# Patient Record
Sex: FEMALE | Race: WHITE | Employment: UNEMPLOYED | URBAN - METROPOLITAN AREA
[De-identification: names, ages, dates, MRNs, and addresses within clinical notes are randomized per-mention and may not be internally consistent; named-entity substitution may affect disease eponyms.]

---

## 2017-02-08 ASSESSMENT — ENCOUNTER SYMPTOMS
ARTHRALGIAS: 1
DECREASED LIBIDO: 1

## 2017-02-22 ENCOUNTER — OFFICE VISIT (OUTPATIENT)
Dept: UROLOGY | Facility: CLINIC | Age: 57
End: 2017-02-22
Payer: COMMERCIAL

## 2017-02-22 VITALS
DIASTOLIC BLOOD PRESSURE: 70 MMHG | BODY MASS INDEX: 25.49 KG/M2 | WEIGHT: 153 LBS | HEIGHT: 65 IN | HEART RATE: 80 BPM | SYSTOLIC BLOOD PRESSURE: 110 MMHG

## 2017-02-22 DIAGNOSIS — Z87.42 HISTORY OF UTERINE PROLAPSE: ICD-10-CM

## 2017-02-22 DIAGNOSIS — N81.11 CYSTOCELE, MIDLINE: ICD-10-CM

## 2017-02-22 DIAGNOSIS — N81.11 CYSTOCELE, MIDLINE: Primary | ICD-10-CM

## 2017-02-22 DIAGNOSIS — N39.3 FEMALE STRESS INCONTINENCE: Primary | ICD-10-CM

## 2017-02-22 LAB
ALBUMIN UR-MCNC: NEGATIVE MG/DL
APPEARANCE UR: CLEAR
BILIRUB UR QL STRIP: NEGATIVE
COLOR UR AUTO: YELLOW
GLUCOSE UR STRIP-MCNC: NEGATIVE MG/DL
HGB UR QL STRIP: NEGATIVE
KETONES UR STRIP-MCNC: NEGATIVE MG/DL
LEUKOCYTE ESTERASE UR QL STRIP: NEGATIVE
NITRATE UR QL: NEGATIVE
PH UR STRIP: 7 PH (ref 5–7)
SP GR UR STRIP: 1.01 (ref 1–1.03)
URN SPEC COLLECT METH UR: NORMAL
UROBILINOGEN UR STRIP-ACNC: 0.2 EU/DL (ref 0.2–1)

## 2017-02-22 PROCEDURE — 51798 US URINE CAPACITY MEASURE: CPT | Performed by: UROLOGY

## 2017-02-22 PROCEDURE — 99204 OFFICE O/P NEW MOD 45 MIN: CPT | Mod: 25 | Performed by: UROLOGY

## 2017-02-22 PROCEDURE — 81003 URINALYSIS AUTO W/O SCOPE: CPT | Performed by: UROLOGY

## 2017-02-22 ASSESSMENT — ENCOUNTER SYMPTOMS
INSOMNIA: 0
CHILLS: 0
MEMORY LOSS: 0
DECREASED APPETITE: 0
WEIGHT GAIN: 0
CONSTIPATION: 0
DOUBLE VISION: 0
NUMBNESS: 0
SPEECH CHANGE: 0
LOSS OF CONSCIOUSNESS: 0
DYSPNEA ON EXERTION: 0
COUGH DISTURBING SLEEP: 0
EYE PAIN: 0
PALPITATIONS: 0
WEAKNESS: 0
DIZZINESS: 0
HEMOPTYSIS: 0
NAIL CHANGES: 0
LEG SWELLING: 0
POLYPHAGIA: 0
TREMORS: 0
SINUS PAIN: 0
POLYDIPSIA: 0
CLAUDICATION: 0
SHORTNESS OF BREATH: 0
POSTURAL DYSPNEA: 0
JAUNDICE: 0
PANIC: 0
WEIGHT LOSS: 0
TROUBLE SWALLOWING: 0
SKIN CHANGES: 0
BLOATING: 0
LEG PAIN: 0
HALLUCINATIONS: 0
HEARTBURN: 0
ARTHRALGIAS: 1
BOWEL INCONTINENCE: 0
COUGH: 0
EYE WATERING: 0
SORE THROAT: 0
DECREASED CONCENTRATION: 0
ALTERED TEMPERATURE REGULATION: 0
PARALYSIS: 0
SMELL DISTURBANCE: 0
DECREASED LIBIDO: 1
ABDOMINAL PAIN: 0
EXTREMITY NUMBNESS: 0
SINUS CONGESTION: 0
BLOOD IN STOOL: 0
HYPOTENSION: 0
BREAST PAIN: 0
DIARRHEA: 0
FATIGUE: 0
NIGHT SWEATS: 0
SNORES LOUDLY: 0
LIGHT-HEADEDNESS: 0
ORTHOPNEA: 0
VOMITING: 0
HYPERTENSION: 0
NERVOUS/ANXIOUS: 0
TINGLING: 0
SWOLLEN GLANDS: 0
EXERCISE INTOLERANCE: 0
FEVER: 0
SLEEP DISTURBANCES DUE TO BREATHING: 0
INCREASED ENERGY: 0
BRUISES/BLEEDS EASILY: 0
BREAST MASS: 0
SEIZURES: 0
RECTAL BLEEDING: 0
DEPRESSION: 0
DISTURBANCES IN COORDINATION: 0
EYE IRRITATION: 0
TASTE DISTURBANCE: 0
NAUSEA: 0
HOARSE VOICE: 0
NECK MASS: 0
RECTAL PAIN: 0
RESPIRATORY PAIN: 0
SPUTUM PRODUCTION: 0
HEADACHES: 0
EYE REDNESS: 0
WHEEZING: 0
TACHYCARDIA: 0
SYNCOPE: 0
POOR WOUND HEALING: 0

## 2017-02-22 ASSESSMENT — PAIN SCALES - GENERAL: PAINLEVEL: NO PAIN (0)

## 2017-02-22 NOTE — MR AVS SNAPSHOT
After Visit Summary   2/22/2017    Leticia Tompkins    MRN: [id number]           Patient Information     Date Of Birth          1960        Visit Information        Provider Department      2/22/2017 1:00 PM Gris Cooney MD [facility] [place]        Today's Diagnoses     Female stress incontinence    -  1    Cystocele, midline          Care Instructions    Websites with free information:    American Urogynecologic Society patient website: www.voicesforpfd.org    Total Control Program: www.totalcontrolprogram.com    Please see one of the dedicated pelvic floor physical therapist ([facility] [phone])    Please return for bladder testing (urodynamics)    Please return to see me after the bladder testing for us to possible looking in the bladder (Cystoscopy)    It was a pleasure meeting with you today.  Thank you for allowing me and my team the privilege of caring for you today.  YOU are the reason we are here, and I truly hope we provided you with the excellent service you deserve.  Please let us know if there is anything else we can do for you so that we can be sure you are leaving completely satisfied with your care experience.            Follow-ups after your visit        Additional Services     ANGELICA Physical Therapy Referral       **This order will print in the [facility] Scheduling Office**    Physical Therapy, Hand Therapy and Chiropractic Care are available through:    *[facility]  *[facility]  *[facility]    Call one number to schedule at any of the above locations: [phone].    Your provider has referred you to: Physical Therapy at [facility] or [facility]    Indication/Reason for Referral: Women's Health (Please Complete Special Programs SmartList)  Onset of Illness: years  Therapy Orders: Evaluate and Treat  Special Programs: None and Women's Health: Pelvic  Dysfunction: myofascial tenderness of the pelvic floor, pelvic floor dysfunction  Pelvic Floor Weakness: stress incontnence and  Biofeedback, E-Stim/TENS/TENS Rental if deemed appropriate by therapist, Exercise/HEP and Myofascial Release/Massage (internal)  Special Request: Exercise: Home Exercise Program  Manual Therapy: Myofascial Release/Massage (internal)  Modalities: As Indicated E-Stim/TENS    Torsten Sarmiento      Additional Comments for the Therapist or Chiropractor: No adis please.  Core strengthening    Please be aware that coverage of these services is subject to the terms and limitations of your health insurance plan.  Call member services at your health plan with any benefit or coverage questions.      Please bring the following to your appointment:    *Your personal calendar for scheduling future appointments  *Comfortable clothing                  Your next 10 appointments already scheduled     Mar 24, 2017  9:00 AM CDT   (Arrive by 8:45 AM)   Urodynamics with UA NURSE   [facility] ([facility])    [address]  [address]  [facility] [id number] [phone]            Apr 12, 2017  3:00 PM CDT   Cystoscopy with Gris Cooney MD,  CYF   [facility] ([facility])    [address]  [address]  [facility] [id number] [phone]              Who to contact     If you have questions or need follow up information about today's clinic visit or your schedule please contact [facility] directly at [phone].  Normal or non-critical lab and imaging results will be communicated to you by MyChart, letter or phone within 4 business days after the clinic has received the results. If you do not hear from us within 7 days, please contact the clinic through MyChart or phone. If you have a critical or abnormal lab result, we will notify you by phone as  "soon as possible.  Submit refill requests through [employer] or call your pharmacy and they will forward the refill request to us. Please allow 3 business days for your refill to be completed.          Additional Information About Your Visit        [employer] Information     [employer] gives you secure access to your electronic health record. If you see a primary care provider, you can also send messages to your care team and make appointments. If you have questions, please call your primary care clinic.  If you do not have a primary care provider, please call [phone] and they will assist you.        Care EveryWhere ID     This is your Care EveryWhere ID. This could be used by other organizations to access your [facility] medical records  [id number]        Your Vitals Were     Pulse Height BMI (Body Mass Index)             80 1.651 m (5' 5\") 25.46 kg/m2          Blood Pressure from Last 3 Encounters:   02/22/17 110/70   09/16/16 102/70   05/26/16 126/82    Weight from Last 3 Encounters:   02/22/17 69.4 kg (153 lb)   09/16/16 69.4 kg (153 lb)   05/26/16 69.9 kg (154 lb)              We Performed the Following     ANGELICA Physical Therapy Referral     MEASURE POST-VOID RESIDUAL URINE/BLADDER CAPACITY, US NON-IMAGING (36861)     UA without Microscopic        Primary Care Provider Office Phone # Fax #    Toni Gan [phone]       [facility] [address] [facility] [address]        Thank you!     Thank you for choosing [facility] [place]  for your care. Our goal is always to provide you with excellent care. Hearing back from our patients is one way we can continue to improve our services. Please take a few minutes to complete the written survey that you may receive in the mail after your visit with us. Thank you!             Your Updated Medication List - Protect others around you: Learn how to safely use, store and throw away your medicines at " www.disposemymeds.org.          This list is accurate as of: 2/22/17  2:09 PM.  Always use your most recent med list.                   Brand Name Dispense Instructions for use    IBUPROFEN PO      Take 200 mg by mouth as needed for moderate pain Reported on 2/22/2017

## 2017-02-22 NOTE — PROGRESS NOTES
"February 22, 2017    Referring Provider: No referring provider defined for this encounter.    Primary Care Provider: Toni Gan    CC: Urinary incontinence    HPI:  Leticia Tompkins is a 56 year old female who presents for evaluation of her pelvic floor symptoms.  She has a history of a \"subtotal hysterectomy, bilateral salpingectomy and sacrocolpopexy\" with Dr Cannon several years ago.  She is here today because she has been noting some stress incontinence and as she is preparing to retire from her current job she is considering undergoing surgery for the stress incontinence. She had talked to Dr Cannon in the past and he noted that there is a \"quick easy procedure\" she can undergo.  She denies hematuria, UTI, vaginal bleeding, vaginal bulge, constipation, dyspareunia.  She does not know if her cervix was left at the time of surgery.    Past Medical History   Diagnosis Date     Depressive disorder, not elsewhere classified 1997     treated with Zoloft for 8 months     Lumbago      L5-S1 radiculopathy     Tachycardia, unspecified 2004     Single episode treated with Adenosine       Past Surgical History   Procedure Laterality Date     C nonspecific procedure  1991     Tubal ligation     C nonspecific procedure       Lasik eye surgery     C nonspecific procedure  3/07      L5-S1 discectomy     Orthopedic surgery Left 4-19-16     left foot bunionectomy     Social History     Social History     Marital status:      Spouse name: N/A     Number of children: N/A     Years of education: N/A     Occupational History     Not on file.     Social History Main Topics     Smoking status: Former Smoker     Quit date: 3/12/1983     Smokeless tobacco: Never Used     Alcohol use 0.0 - 1.5 oz/week     0 - 3 Glasses of wine per week     Drug use: No     Sexual activity: Yes     Other Topics Concern     Not on file     Social History Narrative       Family History   Problem Relation Age of Onset     DIABETES Mother      " Cardiovascular Mother      a fib     Lipids Mother      Melanoma Father 77       Review of Systems     Constitutional:  Negative for fever, chills, weight loss, weight gain, fatigue, decreased appetite, night sweats, recent stressors, height gain, height loss, post-operative complications, incisional pain, hallucinations, increased energy, hyperactivity and confused.   HENT:  Negative for ear pain, hearing loss, tinnitus, nosebleeds, trouble swallowing, hoarse voice, mouth sores, sore throat, ear discharge, tooth pain, gum tenderness, taste disturbance, smell disturbance, hearing aid, bleeding gums, dry mouth, sinus pain, sinus congestion and neck mass.    Eyes:  Negative for double vision, pain, redness, eye pain, decreased vision, eye watering, eye bulging, eye dryness, flashing lights, spots, floaters, strabismus, tunnel vision, jaundice and eye irritation.   Respiratory:   Negative for cough, hemoptysis, sputum production, shortness of breath, wheezing, sleep disturbances due to breathing, snores loudly, respiratory pain, dyspnea on exertion, cough disturbing sleep and postural dyspnea.    Cardiovascular:  Negative for chest pain, dyspnea on exertion, palpitations, orthopnea, claudication, leg swelling, fingers/toes turn blue, hypertension, hypotension, syncope, history of heart murmur, chest pain on exertion, chest pain at rest, pacemaker, few scattered varicosities, leg pain, sleep disturbances due to breathing, tachycardia, light-headedness, exercise intolerance and edema.   Gastrointestinal:  Negative for heartburn, nausea, vomiting, abdominal pain, diarrhea, constipation, blood in stool, melena, rectal pain, bloating, hemorrhoids, bowel incontinence, jaundice, rectal bleeding, coffee ground emesis and change in stool.   Genitourinary:  Positive for bladder incontinence and decreased libido.   Musculoskeletal:  Positive for arthralgias.   Skin:  Negative for nail changes, itching, poor wound healing, rash,  "hair changes, skin changes, acne, warts, poor wound healing, scarring, flaky skin, Raynaud's phenomenon, sensitivity to sunlight and skin thickening.   Neurological:  Negative for dizziness, tingling, tremors, speech change, seizures, loss of consciousness, weakness, light-headedness, numbness, headaches, disturbances in coordination, extremity numbness, memory loss, difficulty walking and paralysis.   Endo/Heme:  Negative for anemia, swollen glands and bruises/bleeds easily.   Psychiatric/Behavioral:  Negative for depression, hallucinations, memory loss, decreased concentration, mood swings and panic attacks.    Breast:  Negative for breast discharge, breast mass, breast pain and nipple retraction.   Endocrine:  Negative for altered temperature regulation, polyphagia, polydipsia, unwanted hair growth and change in facial hair.      No Known Allergies    Current Outpatient Prescriptions   Medication     IBUPROFEN PO     No current facility-administered medications for this visit.        Ht 1.651 m (5' 5\")  Wt 69.4 kg (153 lb)  BMI 25.46 kg/m2 No LMP recorded. Patient has had a hysterectomy. Body mass index is 25.46 kg/(m^2).  She is alert, comfortable in no acute distress. Abdomen is soft, non-tender, non-distended, no CVAT.  Well healed pfannenstiel scar. Normal external female genitalia.  Negative ESST.  Speculum and bimanual exam are remarkable for myofascial tenderness of the pelvic floor.  What appears to be a cervix is flush with the vagina near the apex.  She has excellent support on supine strain.      Urine dip negative     mL by bladder scan    A/P: Leticia Tompkins is a 56 year old F with history of supracervical hysterectomy, sacrocolpopexy presenting today with stress incontinence    We discussed how her pelvic floor symptoms are related to the physical exam findings and her pelvic floor myofascial dysfunction.  We discussed how the recommended treatment is dedicated pelvic floor therapy.  We " discussed how the pelvic floor physical therapy works and patient is agreeable.  Referral was placed.      Given that she is strongly considering surgery as well, we discussed the recommendations for urodynamics given that she does not demonstrate leakage on exam today    As she also appears to still have her cervix have reminded her that she needs to continue with cervical cancer screening.  Names of some of the OBGYN groups in [place] was provided to her    RTC after UDS.  Discussed that may consider cystoscopy at that visit too given her history of sacrocolpopexy    40 minutes were spent with the patient today, > 50% in counseling and coordination of care    Gris Cooney MD MPH    Urology    CC  Patient Care Team:  Toni Gan MD as PCP - General (Family Practice)

## 2017-02-22 NOTE — PATIENT INSTRUCTIONS
Websites with free information:    American Urogynecologic Society patient website: www.voicesforpfd.org    Total Control Program: www.totalcontrolprogram.com    Please see one of the dedicated pelvic floor physical therapist ([facility] [phone])    Please return for bladder testing (urodynamics)    Please return to see me after the bladder testing for us to possible looking in the bladder (Cystoscopy)    It was a pleasure meeting with you today.  Thank you for allowing me and my team the privilege of caring for you today.  YOU are the reason we are here, and I truly hope we provided you with the excellent service you deserve.  Please let us know if there is anything else we can do for you so that we can be sure you are leaving completely satisfied with your care experience.

## 2017-02-22 NOTE — LETTER
"2/22/2017       RE: Leticia Tompkins  [address]  [facility] [address]     Dear Colleague,    Thank you for referring your patient, Leticia Tompkins, to the [facility] [place] at [facility]. Please see a copy of my visit note below.    February 22, 2017    Referring Provider: No referring provider defined for this encounter.    Primary Care Provider: Toni Gan    CC: Urinary incontinence    HPI:  Leticia Tompkins is a 56 year old female who presents for evaluation of her pelvic floor symptoms.  She has a history of a \"subtotal hysterectomy, bilateral salpingectomy and sacrocolpopexy\" with Dr Cannon several years ago.  She is here today because she has been noting some stress incontinence and as she is preparing to retire from her current job she is considering undergoing surgery for the stress incontinence. She had talked to Dr Cannon in the past and he noted that there is a \"quick easy procedure\" she can undergo.  She denies hematuria, UTI, vaginal bleeding, vaginal bulge, constipation, dyspareunia.  She does not know if her cervix was left at the time of surgery.    Past Medical History   Diagnosis Date     Depressive disorder, not elsewhere classified 1997     treated with Zoloft for 8 months     Lumbago      L5-S1 radiculopathy     Tachycardia, unspecified 2004     Single episode treated with Adenosine       Past Surgical History   Procedure Laterality Date     C nonspecific procedure  1991     Tubal ligation     C nonspecific procedure       Lasik eye surgery     C nonspecific procedure  3/07      L5-S1 discectomy     Orthopedic surgery Left 4-19-16     left foot bunionectomy     Social History     Social History     Marital status:      Spouse name: N/A     Number of children: N/A     Years of education: N/A     Occupational History     Not on file.     Social History Main Topics     Smoking status: Former Smoker     Quit date: " 3/12/1983     Smokeless tobacco: Never Used     Alcohol use 0.0 - 1.5 oz/week     0 - 3 Glasses of wine per week     Drug use: No     Sexual activity: Yes     Other Topics Concern     Not on file     Social History Narrative       Family History   Problem Relation Age of Onset     DIABETES Mother      Cardiovascular Mother      a fib     Lipids Mother      Melanoma Father 77       Review of Systems     Constitutional:  Negative for fever, chills, weight loss, weight gain, fatigue, decreased appetite, night sweats, recent stressors, height gain, height loss, post-operative complications, incisional pain, hallucinations, increased energy, hyperactivity and confused.   HENT:  Negative for ear pain, hearing loss, tinnitus, nosebleeds, trouble swallowing, hoarse voice, mouth sores, sore throat, ear discharge, tooth pain, gum tenderness, taste disturbance, smell disturbance, hearing aid, bleeding gums, dry mouth, sinus pain, sinus congestion and neck mass.    Eyes:  Negative for double vision, pain, redness, eye pain, decreased vision, eye watering, eye bulging, eye dryness, flashing lights, spots, floaters, strabismus, tunnel vision, jaundice and eye irritation.   Respiratory:   Negative for cough, hemoptysis, sputum production, shortness of breath, wheezing, sleep disturbances due to breathing, snores loudly, respiratory pain, dyspnea on exertion, cough disturbing sleep and postural dyspnea.    Cardiovascular:  Negative for chest pain, dyspnea on exertion, palpitations, orthopnea, claudication, leg swelling, fingers/toes turn blue, hypertension, hypotension, syncope, history of heart murmur, chest pain on exertion, chest pain at rest, pacemaker, few scattered varicosities, leg pain, sleep disturbances due to breathing, tachycardia, light-headedness, exercise intolerance and edema.   Gastrointestinal:  Negative for heartburn, nausea, vomiting, abdominal pain, diarrhea, constipation, blood in stool, melena, rectal pain,  "bloating, hemorrhoids, bowel incontinence, jaundice, rectal bleeding, coffee ground emesis and change in stool.   Genitourinary:  Positive for bladder incontinence and decreased libido.   Musculoskeletal:  Positive for arthralgias.   Skin:  Negative for nail changes, itching, poor wound healing, rash, hair changes, skin changes, acne, warts, poor wound healing, scarring, flaky skin, Raynaud's phenomenon, sensitivity to sunlight and skin thickening.   Neurological:  Negative for dizziness, tingling, tremors, speech change, seizures, loss of consciousness, weakness, light-headedness, numbness, headaches, disturbances in coordination, extremity numbness, memory loss, difficulty walking and paralysis.   Endo/Heme:  Negative for anemia, swollen glands and bruises/bleeds easily.   Psychiatric/Behavioral:  Negative for depression, hallucinations, memory loss, decreased concentration, mood swings and panic attacks.    Breast:  Negative for breast discharge, breast mass, breast pain and nipple retraction.   Endocrine:  Negative for altered temperature regulation, polyphagia, polydipsia, unwanted hair growth and change in facial hair.      No Known Allergies    Current Outpatient Prescriptions   Medication     IBUPROFEN PO     No current facility-administered medications for this visit.        Ht 1.651 m (5' 5\")  Wt 69.4 kg (153 lb)  BMI 25.46 kg/m2 No LMP recorded. Patient has had a hysterectomy. Body mass index is 25.46 kg/(m^2).  She is alert, comfortable in no acute distress. Abdomen is soft, non-tender, non-distended, no CVAT.  Well healed pfannenstiel scar. Normal external female genitalia.  Negative ESST.  Speculum and bimanual exam are remarkable for myofascial tenderness of the pelvic floor.  What appears to be a cervix is flush with the vagina near the apex.  She has excellent support on supine strain.      Urine dip negative     mL by bladder scan    A/P: Leticia Tompkins is a 56 year old F with history of " supracervical hysterectomy, sacrocolpopexy presenting today with stress incontinence    We discussed how her pelvic floor symptoms are related to the physical exam findings and her pelvic floor myofascial dysfunction.  We discussed how the recommended treatment is dedicated pelvic floor therapy.  We discussed how the pelvic floor physical therapy works and patient is agreeable.  Referral was placed.      Given that she is strongly considering surgery as well, we discussed the recommendations for urodynamics given that she does not demonstrate leakage on exam today    As she also appears to still have her cervix have reminded her that she needs to continue with cervical cancer screening.  Names of some of the OBGYN groups in [place] was provided to her    RTC after UDS.  Discussed that may consider cystoscopy at that visit too given her history of sacrocolpopexy    40 minutes were spent with the patient today, > 50% in counseling and coordination of care    Gris Cooney MD MPH    Urology    CC  Patient Care Team:  Toni Gan MD as PCP - General (Family Practice)

## 2017-03-06 ENCOUNTER — HOSPITAL ENCOUNTER (OUTPATIENT)
Dept: MAMMOGRAPHY | Facility: CLINIC | Age: 57
Discharge: HOME | End: 2017-03-06
Attending: FAMILY MEDICINE | Admitting: FAMILY MEDICINE
Payer: COMMERCIAL

## 2017-03-06 DIAGNOSIS — Z12.31 ENCOUNTER FOR SCREENING MAMMOGRAM FOR HIGH-RISK PATIENT: ICD-10-CM

## 2017-03-06 PROCEDURE — 77063 BREAST TOMOSYNTHESIS BI: CPT

## 2017-03-20 ENCOUNTER — THERAPY VISIT (OUTPATIENT)
Dept: PHYSICAL THERAPY | Facility: CLINIC | Age: 57
End: 2017-03-20
Payer: COMMERCIAL

## 2017-03-20 DIAGNOSIS — N39.3 STRESS INCONTINENCE OF URINE: Primary | ICD-10-CM

## 2017-03-20 DIAGNOSIS — N81.11 CYSTOCELE, MIDLINE: ICD-10-CM

## 2017-03-20 PROCEDURE — 97112 NEUROMUSCULAR REEDUCATION: CPT | Mod: GP | Performed by: PHYSICAL THERAPIST

## 2017-03-20 PROCEDURE — 97110 THERAPEUTIC EXERCISES: CPT | Mod: GP | Performed by: PHYSICAL THERAPIST

## 2017-03-20 PROCEDURE — 97535 SELF CARE MNGMENT TRAINING: CPT | Mod: GP | Performed by: PHYSICAL THERAPIST

## 2017-03-20 PROCEDURE — 97161 PT EVAL LOW COMPLEX 20 MIN: CPT | Mod: GP | Performed by: PHYSICAL THERAPIST

## 2017-03-20 NOTE — PROGRESS NOTES
Subjective:  [facility] Initial Evaluation    History of current episode:    Onset date/MD order: 3/2/2017.    CC/Present symptoms: Stress Urinary Incontinence with leakage during cough/sneeze or lifting- considering bladder sling.  Pain rating (0-10): 0  Conditioning is improving/unchanging/worsening: gradually worsening in last 2 years.    Pelvic/Abdominal Surgeries: yes- pelvic reconstruction for rectocele and cystocele 3 yrs ago  Sexually active:yes  Hx of or present sexually transmitted disease:no  SMHx:  subtotal hysterectomy, bilateral salpingectomy and sacrocolpopexy 3 yrs ago with Dr Cannon.  Current occupation:   Current activity: walking  Goals: dec leakage  Red flags:none reported      Urination:  Do you leak on the way to the bathroom or with a strong urge to void? No   Do you leak with cough,sneeze, jumping, running?Yes   Any other activities that cause leaking? Yes- lifting  Do you have triggers that make you feel you can't wait to go to the bathroom? No What are they? na.  Type of pad and number used per day? 0  When you leak what is the amount? Small drops  How long can you delay the need to urinate? 15-30 min   Do you feel excessive pressure in pelvic floor:no.  When? na  Frequency of daytime urination:every 2 hrs  Frequency of nighttime urination:1  Can you stop the flow of urine when on the toilet? Yes  Is the volume of urine passed usually: average. (8sec rule= 250ml with average bladder storing 400-600ml)  Do you strain to pass urine? No  Do you have a slow or hesitant urinary stream? No  Do you have difficulty initiating the urine stream? No  Is urination painful? No  How many bladder infections have you had in last 12 months?0  Fluid intake(one glass is 8oz or one cup) 3glasses/day, 2 caffinated glasses/day  0 alcohol glasses/day.  Bowel habits:  Frequency of bowel movements? 1 times a day  Consistancy of stool? soft formed, [place] Stool Scale 4  Do you  "ignore the urge to defecate? No  Do you strain to pass stool? No  Pelvic Pain:  Do you have any pelvic pain with intercourse, exams, use of tampons? No  Are you sexually active?Yes  Have you ever been worried for your physical safety? No  Have you practiced the PF(kegel) exercises for 4 or more weeks?no    Treatment/Education provided this session (see flow sheet for additional information):    Self Care Management/Patient education (12 min): Today's session consisted of education regarding pelvic floor muscle anatomy, normal bladder function, urge suppression techniques and/or relaxation techniques as indicated, and instruction in how to complete a bladder diary for assessment next visit. Depending on patient presentation, timed voids, double voids, and proper fluid/fiber intake discussed. Pt was instructed in the pathoanatomy of the pelvic floor utilizing pelvic model.  We discussed what pelvic floor physical therapy is, components of exam, and typical patient progression. Prior to internal PFM exam,  patient was told that they were in control of exam progression, and if at any time were uncomfortable and wished to discontinue we would.        NMR (12 min): Patient instruction in correct isolation of PFM/TrA then coordinated contraction of \"canister\" muscles: diaphragm, Transverse Abdominals, PFM, and posterior spine musculature.  Educated in initiating contraction from anal sphinctor, elevating through PFM, contraction of TrA, while exhaling slowly.  Cued for maximal and sub-maximal contractions, using hip rotators and adductors for overflow if needed.  Pt required cuing for each progression, with decreasing feedback as tolerated.  Instructed to avoid Valsalva/increased inter-abdominal pressure.  Pt cued through verbal, tactile (internal and external), and visual cuing utilizing biofeedback.  For HEP, pt encouraged to separate each phase of the exercise, then work towards coordination of the phases together with " good breath technique with goal of developing good neuromuscular control of area.              HPI                    Objective:    System                                 Pelvic Dysfunction Evaluation:    Bladder/Pelvic Problems:    Storage Problem:  Stress incontinence        Diagnostic Tests:    Pelvic Exam:  3/2    Post Void Residual:  100 ml                      Pelvic Clock Exam:    Ischiocavernosis pain:  -  Bulbocavernosis pain:  -  Transverse Perineal:  -  Levator ANI:  -  Perineal Body:  -      External Assessment:  External assessment pelvic: PERF: 3/3/3-5//2.  Skin Condition:  Normal    Bearing Down/Coughing:  Normal    Introitus:  Normal  Muscle Contraction/Perineal Mobility:  Substitution and elevation and urogential triangle descent  Internal Assessment:      Contraction/Grade:  Fair squeeze, definite lift (3)        Symmetry of Contraction Response:  Inc PFM over TrA  SEMG Biofeedback:        Suraface electrode placement--Perianal:  Yes  Baseline EMG PM:  1-1.4 mV    Peak pelvic muscle contraction:  35-42 mV; submax 16-22    EMG interpretation to fatigue:  3-5 seconds  Position:  SupineAdditional History:  Delivery History:  Vaginal delivery and episiotomies  Number of Pregnancies: 3  Number of Live Births: 3                       General     ROS    Assessment/Plan:      Patient is a 56 year old female with pelvic complaints.    Patient has the following significant findings with corresponding treatment plan.                Diagnosis 1:  MANISH  Decreased strength - therapeutic exercise and therapeutic activities  Decreased proprioception - neuro re-education and therapeutic activities  Impaired muscle performance - biofeedback, electric stimulation and neuro re-education  Decreased function - therapeutic activities  Diagnosis 2:  Cystocele   Impaired muscle performance - neuro re-education    Therapy Evaluation Codes:   1) History comprised of:   Personal factors that impact the plan of care:      None.     Comorbidity factors that impact the plan of care are:      None.     Medications impacting care: None.  2) Examination of Body Systems comprised of:   Body structures and functions that impact the plan of care:      Pelvis.   Activity limitations that impact the plan of care are:      Stress incontinence.  3) Clinical presentation characteristics are:   Stable/Uncomplicated.  4) Decision-Making    Low complexity using standardized patient assessment instrument and/or measureable assessment of functional outcome.  Cumulative Therapy Evaluation is: Low complexity.    Previous and current functional limitations:  (See Goal Flow Sheet for this information)    Short term and Long term goals: (See Goal Flow Sheet for this information)     Communication ability:  Patient appears to be able to clearly communicate and understand verbal and written communication and follow directions correctly.  Treatment Explanation - The following has been discussed with the patient:   RX ordered/plan of care  Anticipated outcomes  Possible risks and side effects  This patient would benefit from PT intervention to resume normal activities.   Rehab potential is good.    Frequency:  1 X week, once daily  Duration:  for 3 weeks tapering to 1 X a month over 9 weeks  Discharge Plan:  Achieve all LTG.  Independent in home treatment program.  Reach maximal therapeutic benefit.    Please refer to the daily flowsheet for treatment today, total treatment time and time spent performing 1:1 timed codes.

## 2017-03-21 NOTE — PROGRESS NOTES
Subjective:                                       Pertinent medical history includes:  Overweight.  Medical allergies: no.  Other surgeries include:  Other.  Current medications:  None as reported by patient.  Current occupation is  .  Patient is working in normal job without restrictions.  Primary job tasks include:  Prolonged sitting.    Barriers include:  None as reported by patient.    Red flags:  None as reported by patient.                      Objective:    System    Physical Exam    General     ROS    Assessment/Plan:

## 2017-03-24 ENCOUNTER — OFFICE VISIT (OUTPATIENT)
Dept: UROLOGY | Facility: CLINIC | Age: 57
End: 2017-03-24
Payer: COMMERCIAL

## 2017-03-24 DIAGNOSIS — N39.3 FEMALE STRESS INCONTINENCE: Primary | ICD-10-CM

## 2017-03-24 LAB
ALBUMIN UR-MCNC: NEGATIVE MG/DL
APPEARANCE UR: CLEAR
BILIRUB UR QL STRIP: NEGATIVE
COLOR UR AUTO: YELLOW
GLUCOSE UR STRIP-MCNC: NEGATIVE MG/DL
HGB UR QL STRIP: NEGATIVE
KETONES UR STRIP-MCNC: NEGATIVE MG/DL
LEUKOCYTE ESTERASE UR QL STRIP: NEGATIVE
NITRATE UR QL: NEGATIVE
PH UR STRIP: 6.5 PH (ref 5–7)
SP GR UR STRIP: 1.01 (ref 1–1.03)
URN SPEC COLLECT METH UR: NORMAL
UROBILINOGEN UR STRIP-ACNC: 0.2 EU/DL (ref 0.2–1)

## 2017-03-24 PROCEDURE — 51784 ANAL/URINARY MUSCLE STUDY: CPT | Mod: TC | Performed by: UROLOGY

## 2017-03-24 PROCEDURE — 51797 INTRAABDOMINAL PRESSURE TEST: CPT | Mod: TC | Performed by: UROLOGY

## 2017-03-24 PROCEDURE — 81003 URINALYSIS AUTO W/O SCOPE: CPT | Performed by: UROLOGY

## 2017-03-24 PROCEDURE — 51798 US URINE CAPACITY MEASURE: CPT | Mod: TC | Performed by: UROLOGY

## 2017-03-24 PROCEDURE — 51728 CYSTOMETROGRAM W/VP: CPT | Mod: TC | Performed by: UROLOGY

## 2017-03-24 PROCEDURE — 51741 ELECTRO-UROFLOWMETRY FIRST: CPT | Mod: TC | Performed by: UROLOGY

## 2017-03-24 RX ORDER — CIPROFLOXACIN 250 MG/1
250 TABLET, FILM COATED ORAL 2 TIMES DAILY
Qty: 2 TABLET | Refills: 0 | Status: SHIPPED | OUTPATIENT
Start: 2017-03-24 | End: 2018-02-20

## 2017-03-24 NOTE — MR AVS SNAPSHOT
After Visit Summary   3/24/2017    Leticia Tompkins    MRN: [id number]           Patient Information     Date Of Birth          1960        Visit Information        Provider Department      3/24/2017 9:00 AM UA NURSE [facility]        Today's Diagnoses     Female stress incontinence    -  1       Follow-ups after your visit        Your next 10 appointments already scheduled     Apr 06, 2017  7:00 AM CDT   ANGELICA For Women Only with Manav Bojorquez, PT   [facility] ([facility]  )    [address]  [facility] [address]   [phone]            Apr 12, 2017  3:00 PM CDT   Cystoscopy with Gris Cooney MD, UA CYF   [facility] ([facility])    [address]  [address]  [facility] [address]   [phone]            Apr 17, 2017  7:00 AM CDT   ANGELICA For Women Only with Gloria Gomez, PT   [facility] ([facility]  )    [address]  [facility] [address]   [phone]              Who to contact     If you have questions or need follow up information about today's clinic visit or your schedule please contact [facility] directly at [phone].  Normal or non-critical lab and imaging results will be communicated to you by MyChart, letter or phone within 4 business days after the clinic has received the results. If you do not hear from us within 7 days, please contact the clinic through MyChart or phone. If you have a critical or abnormal lab result, we will notify you by phone as soon as possible.  Submit refill requests through expressor software or call your pharmacy and they will forward the refill request to us. Please allow 3 business days for your refill to be completed.          Additional Information About Your Visit        MyChart Information      Get Smart Content gives you secure access to your electronic health record. If you see a primary care provider, you can also send messages to your care team and make appointments. If you have questions, please call your primary care clinic.  If you do not have a primary care provider, please call [phone] and they will assist you.        Care EveryWhere ID     This is your Care EveryWhere ID. This could be used by other organizations to access your [facility] medical records  [id number]         Blood Pressure from Last 3 Encounters:   02/22/17 110/70   09/16/16 102/70   05/26/16 126/82    Weight from Last 3 Encounters:   02/22/17 69.4 kg (153 lb)   09/16/16 69.4 kg (153 lb)   05/26/16 69.9 kg (154 lb)              We Performed the Following     COMPLEX UROFLOWMETRY (01359)     CYSTOMETROGRAM COMPLEX W VOIDING PRESS PROFILE (16388)     EMG ANAL/URETH SPHINCTER, OTHER THAN NEEDLE (87819)     UA without Microscopic [BOL3280]     VOIDING PRESSURE STUDY, INTRA-ABDOMINAL (68351)          Today's Medication Changes          These changes are accurate as of: 3/24/17 11:59 PM.  If you have any questions, ask your nurse or doctor.               Start taking these medicines.        Dose/Directions    ciprofloxacin 250 MG tablet   Commonly known as:  CIPRO   Used for:  Female stress incontinence        Dose:  250 mg   Take 1 tablet (250 mg) by mouth 2 times daily   Quantity:  2 tablet   Refills:  0            Where to get your medicines      These medications were sent to [facility] - [place], [place] - [address]  [address] [place] [address], [facility] [address]     Phone:  [phone]     ciprofloxacin 250 MG tablet                Primary Care Provider Office Phone # Fax #    Toni Gan [phone]       [facility] [address]  [facility] [address]        Thank you!     Thank you for choosing [facility] [place]  for your care. Our goal is always  to provide you with excellent care. Hearing back from our patients is one way we can continue to improve our services. Please take a few minutes to complete the written survey that you may receive in the mail after your visit with us. Thank you!             Your Updated Medication List - Protect others around you: Learn how to safely use, store and throw away your medicines at www.disposemymeds.org.          This list is accurate as of: 3/24/17 11:59 PM.  Always use your most recent med list.                   Brand Name Dispense Instructions for use    ciprofloxacin 250 MG tablet    CIPRO    2 tablet    Take 1 tablet (250 mg) by mouth 2 times daily       IBUPROFEN PO      Take 200 mg by mouth as needed for moderate pain Reported on 2/22/2017

## 2017-03-24 NOTE — PROGRESS NOTES
Leticia Tompkins comes into clinic today at the request of Dr Gris Cooney.    This service provided today was under the supervising provider of the day Dr Avila, who was available if needed.  Prior to the start of the procedure and with procedural staff participation, I verbally confirmed the patient s identity using two indicators, relevant allergies, that the procedure was appropriate and matched the consent or emergent situation, and that the correct equipment/implants were available. Immediately prior to starting the procedure I conducted the Time Out with the procedural staff and re-confirmed the patient s name, procedure, and site/side. (The Joint Commission universal protocol was followed.)  Yes    Sedation (Moderate or Deep): None      Leticia is here for urodynamics testing for her stress incontinence. She describes leaking urine when standing with cough,lifting objects or exercise. She does not drink caffeine however doesdrink a large amount of water per day. Procedure described to Leticia and she consented to proceed. See separate  study print out.She will take Cipro today and f/u with provider for results after PT is complete. Antonietta Lujan, HOLLY Lujan

## 2017-04-06 ENCOUNTER — THERAPY VISIT (OUTPATIENT)
Dept: PHYSICAL THERAPY | Facility: CLINIC | Age: 57
End: 2017-04-06
Payer: COMMERCIAL

## 2017-04-06 DIAGNOSIS — N81.11 CYSTOCELE, MIDLINE: ICD-10-CM

## 2017-04-06 PROCEDURE — 97112 NEUROMUSCULAR REEDUCATION: CPT | Mod: GP | Performed by: PHYSICAL THERAPIST

## 2017-04-06 PROCEDURE — 97110 THERAPEUTIC EXERCISES: CPT | Mod: GP | Performed by: PHYSICAL THERAPIST

## 2017-04-10 ENCOUNTER — MYC MEDICAL ADVICE (OUTPATIENT)
Dept: PHYSICAL THERAPY | Facility: CLINIC | Age: 57
End: 2017-04-10

## 2018-02-20 ENCOUNTER — OFFICE VISIT (OUTPATIENT)
Dept: FAMILY MEDICINE | Facility: CLINIC | Age: 58
End: 2018-02-20

## 2018-02-20 VITALS
RESPIRATION RATE: 16 BRPM | WEIGHT: 159.4 LBS | BODY MASS INDEX: 26.53 KG/M2 | TEMPERATURE: 97.8 F | OXYGEN SATURATION: 95 % | DIASTOLIC BLOOD PRESSURE: 74 MMHG | SYSTOLIC BLOOD PRESSURE: 118 MMHG | HEART RATE: 77 BPM

## 2018-02-20 DIAGNOSIS — Z01.818 PREOP GENERAL PHYSICAL EXAM: Primary | ICD-10-CM

## 2018-02-20 DIAGNOSIS — R73.02 GLUCOSE INTOLERANCE (IMPAIRED GLUCOSE TOLERANCE): ICD-10-CM

## 2018-02-20 PROCEDURE — 36415 COLL VENOUS BLD VENIPUNCTURE: CPT | Performed by: FAMILY MEDICINE

## 2018-02-20 PROCEDURE — 83036 HEMOGLOBIN GLYCOSYLATED A1C: CPT | Mod: 90 | Performed by: FAMILY MEDICINE

## 2018-02-20 PROCEDURE — 99214 OFFICE O/P EST MOD 30 MIN: CPT | Performed by: FAMILY MEDICINE

## 2018-02-20 RX ORDER — CHLORAL HYDRATE 500 MG
CAPSULE ORAL
COMMUNITY
End: 2018-03-06

## 2018-02-20 NOTE — MR AVS SNAPSHOT
After Visit Summary   2/20/2018    Leticia Tompkins    MRN: [id number]           Patient Information     Date Of Birth          1960        Visit Information        Provider Department      2/20/2018 12:30 PM Catrina Dhillon MD [facility]        Today's Diagnoses     Preop general physical exam    -  1    Glucose intolerance (impaired glucose tolerance)          Care Instructions      Before Your Surgery      Call your surgeon if there is any change in your health. This includes signs of a cold or flu (such as a sore throat, runny nose, cough, rash or fever).    Do not smoke, drink alcohol or take over the counter medicine (unless your surgeon or primary care doctor tells you to) for the 24 hours before and after surgery.    If you take prescribed drugs: Follow your doctor s orders about which medicines to take and which to stop until after surgery.    Eating and drinking prior to surgery: follow the instructions from your surgeon    Take a shower or bath the night before surgery. Use the soap your surgeon gave you to gently clean your skin. If you do not have soap from your surgeon, use your regular soap. Do not shave or scrub the surgery site.  Wear clean pajamas and have clean sheets on your bed.           Follow-ups after your visit        Who to contact     If you have questions or need follow up information about today's clinic visit or your schedule please contact [facility] directly at [phone].  Normal or non-critical lab and imaging results will be communicated to you by PlanZaphart, letter or phone within 4 business days after the clinic has received the results. If you do not hear from us within 7 days, please contact the clinic through PlanZaphart or phone. If you have a critical or abnormal lab result, we will notify you by phone as soon as possible.  Submit refill requests through DNage or call your pharmacy and they will forward the refill request to us.  Please allow 3 business days for your refill to be completed.          Additional Information About Your Visit        MyChart Information     Kee[employer] gives you secure access to your electronic health record. If you see a primary care provider, you can also send messages to your care team and make appointments. If you have questions, please call your primary care clinic.  If you do not have a primary care provider, please call [phone] and they will assist you.        Care EveryWhere ID     This is your Care EveryWhere ID. This could be used by other organizations to access your [facility] medical records  [id number]        Your Vitals Were     Pulse Temperature Respirations Pulse Oximetry BMI (Body Mass Index)       77 97.8  F (36.6  C) (Oral) 16 95% 26.53 kg/m2        Blood Pressure from Last 3 Encounters:   02/20/18 118/74   02/22/17 110/70   09/16/16 102/70    Weight from Last 3 Encounters:   02/20/18 72.3 kg (159 lb 6.4 oz)   02/22/17 69.4 kg (153 lb)   09/16/16 69.4 kg (153 lb)              We Performed the Following     Hemoglobin A1C ([facility])        Primary Care Provider Office Phone # Fax #    Toni Gan [phone] [address] [facility] [address]        Equal Access to Services     [facility]: Hadii aad ku hadasho Soomaali, waaxda luqadaha, qaybta kaalmada adeegyada, waxay idiin hayaan will khshukri pagan . So [facility] [phone].    ATENCIÓN: Si habla español, tiene a coleman disposición servicios gratuitos de asistencia lingüística. Llame al [phone].    We comply with applicable federal civil rights laws and [place] laws. We do not discriminate on the basis of race, color, national origin, age, disability, sex, sexual orientation, or gender identity.            Thank you!     Thank you for choosing [facility]  for your care. Our goal is always to provide you with excellent care. Hearing back from our patients is one way we can continue to  improve our services. Please take a few minutes to complete the written survey that you may receive in the mail after your visit with us. Thank you!             Your Updated Medication List - Protect others around you: Learn how to safely use, store and throw away your medicines at www.disposemymeds.org.          This list is accurate as of 2/20/18  1:18 PM.  Always use your most recent med list.                   Brand Name Dispense Instructions for use Diagnosis    fish oil-omega-3 fatty acids 1000 MG capsule           IBUPROFEN PO      Take 200 mg by mouth as needed for moderate pain Reported on 2/22/2017

## 2018-02-20 NOTE — PROGRESS NOTES
[facility]  [address] [place] [id number] [phone]  Dept: [phone]    PRE-OP EVALUATION:  Today's date: 2018    Leticia Tompkins (: 1960) presents for pre-operative evaluation assessment as requested by Dr. Tone Cole.  She requires evaluation and anesthesia risk assessment prior to undergoing surgery/procedure for treatment of Mortons Neuroma .    Proposed Surgery/ Procedure: Mortons Neuroma  Date of Surgery/ Procedure: 18  Time of Surgery/ Procedure: 0715  Hospital/Surgical Facility: [facility]  Fax number for surgical facility: [phone]  Primary Physician: Toni Gan  Type of Anesthesia Anticipated: to be determined    Patient has a Health Care Directive or Living Will:  NO    1. NO - Do you have a history of heart attack, stroke, stent, bypass or surgery on an artery in the head, neck, heart or legs?  2. NO - Do you ever have any pain or discomfort in your chest?  3. NO - Do you have a history of  Heart Failure?  4. NO - Are you troubled by shortness of breath when: walking on the level, up a slight hill or at night?  5. NO - Do you currently have a cold, bronchitis or other respiratory infection?  6. NO - Do you have a cough, shortness of breath or wheezing?  7. NO - Do you sometimes get pains in the calves of your legs when you walk?  8. NO - Do you or anyone in your family have previous history of blood clots?  9. NO - Do you or does anyone in your family have a serious bleeding problem such as prolonged bleeding following surgeries or cuts?  10. NO - Have you ever had problems with anemia or been told to take iron pills?  11. NO - Have you had any abnormal blood loss such as black, tarry or bloody stools, or abnormal vaginal bleeding?  12. NO - Have you ever had a blood transfusion?  13. NO - Have you or any of your relatives ever had problems with anesthesia?  14. NO - Do you have sleep apnea, excessive snoring or daytime  drowsiness?  15. NO - Do you have any prosthetic heart valves?  16. NO - Do you have prosthetic joints?  17. NO - Is there any chance that you may be pregnant?      HPI:     HPI related to upcoming procedure: left foot pain for about a year, progressive, responsive to cortisone injection but only lasted for about month. Scheduled for removal of Bills's Neuroma.    She has diagnosis of Pre-diabetes, hemoglobin A1c of 5.8 in April 2016, due for this to be rechecked.    MEDICAL HISTORY:     Patient Active Problem List    Diagnosis Date Noted     Stress incontinence of urine 03/20/2017     Priority: Medium     Cystocele, midline 03/20/2017     Priority: Medium     Health Care Home 03/13/2014     Priority: Medium     State Tier Level:  0         CARDIOVASCULAR SCREENING; LDL GOAL LESS THAN 160 10/31/2010     Priority: Medium     Carotene pigmentation of skin 07/17/2009     Priority: Medium     Lumbago      Priority: Medium     L5-S1 radiculopathy        Past Medical History:   Diagnosis Date     Depressive disorder, not elsewhere classified 1997    treated with Zoloft for 8 months     Lumbago     L5-S1 radiculopathy     Spider veins      Tachycardia, unspecified 2004    Single episode treated with Adenosine     Past Surgical History:   Procedure Laterality Date     BLADDER SURGERY       C NONSPECIFIC PROCEDURE  1991    Tubal ligation     C NONSPECIFIC PROCEDURE      Lasik eye surgery     C NONSPECIFIC PROCEDURE  3/07     L5-S1 discectomy     CYSTOCELE REPAIR N/A 0/2013     HYSTERECTOMY N/A 05/20013     ORTHOPEDIC SURGERY Left 4-19-16    left foot bunionectomy     RECTOCELE REPAIR N/A 05/2013     Current Outpatient Prescriptions   Medication Sig Dispense Refill     IBUPROFEN PO Take 200 mg by mouth as needed for moderate pain Reported on 2/22/2017       fish oil-omega-3 fatty acids 1000 MG capsule        OTC products: None, except as noted above    No Known Allergies   Latex Allergy: NO    Social History   Substance Use  Topics     Smoking status: Former Smoker     Types: Cigarettes     Quit date: 3/12/1983     Smokeless tobacco: Never Used     Alcohol use 0.0 - 1.5 oz/week     0 - 3 Glasses of wine per week     History   Drug Use No       REVIEW OF SYSTEMS:   Constitutional, neuro, ENT, endocrine, pulmonary, cardiac, gastrointestinal, genitourinary, musculoskeletal, integument and psychiatric systems are negative, except as otherwise noted.    EXAM:   /74  Pulse 77  Temp 97.8  F (36.6  C) (Oral)  Resp 16  Wt 72.3 kg (159 lb 6.4 oz)  SpO2 95%  BMI 26.53 kg/m2    GENERAL APPEARANCE: healthy, alert and no distress     EYES: EOMI, PERRL     HENT: ear canals and TM's normal and nose and mouth without ulcers or lesions     NECK: no adenopathy, no asymmetry, masses, or scars and thyroid normal to palpation     RESP: lungs clear to auscultation - no rales, rhonchi or wheezes     CV: regular rates and rhythm, normal S1 S2, no S3 or S4 and no murmur, click or rub     ABDOMEN:  soft, nontender, no HSM or masses and bowel sounds normal     MS: extremities normal- no gross deformities noted, no evidence of inflammation in joints, FROM in all extremities.     SKIN: no suspicious lesions or rashes     NEURO: Normal strength and tone, sensory exam grossly normal, mentation intact and speech normal     PSYCH: mentation appears normal. and affect normal/bright     LYMPHATICS: No cervical adenopathy    DIAGNOSTICS:   No labs or EKG required for low risk surgery (cataract, skin procedure, breast biopsy, etc)    Recent Labs   Lab Test  04/05/16   1625  04/05/16   1550  05/29/15   0903  05/16/14   1544   06/07/11   0841   HGB   --   13.9   --   14.3   < >   --    PLT   --   296   --   369   < >   --    NA   --    --   141   --    --    --    POTASSIUM   --    --   4.3   --    --    --    CR   --    --   0.77   --    --    --    A1C  5.8*   --    --    --    --   6.2*    < > = values in this interval not displayed.        IMPRESSION:   Reason  for surgery/procedure: Removal of Bills's Neuroma  Diagnosis/reason for consult: Preoperative clearance    The proposed surgical procedure is considered LOW risk.    REVISED CARDIAC RISK INDEX  The patient has the following serious cardiovascular risks for perioperative complications such as (MI, PE, VFib and 3  AV Block):  No serious cardiac risks  INTERPRETATION: 0 risks: Class I (very low risk - 0.4% complication rate)    The patient has the following additional risks for perioperative complications:  No identified additional risks      ICD-10-CM    1. Preop general physical exam Z01.818    2. Glucose intolerance (impaired glucose tolerance) R73.02 Hemoglobin A1C ([facility])       RECOMMENDATIONS:             APPROVAL GIVEN to proceed with proposed procedure, without further diagnostic evaluation       Signed Electronically by: Catrina Dhillon MD    Copy of this evaluation report is provided to requesting physician.    [facility] Preop Guidelines

## 2018-02-21 LAB — HBA1C MFR BLD: 6.2 % (ref 4.8–5.6)

## 2018-03-06 ENCOUNTER — TRANSFERRED RECORDS (OUTPATIENT)
Dept: FAMILY MEDICINE | Facility: CLINIC | Age: 58
End: 2018-03-06

## 2018-03-06 ENCOUNTER — HOSPITAL ENCOUNTER (INPATIENT)
Facility: CLINIC | Age: 58
LOS: 3 days | Discharge: HOME | DRG: 392 | End: 2018-03-09
Attending: EMERGENCY MEDICINE | Admitting: HOSPITALIST
Payer: COMMERCIAL

## 2018-03-06 ENCOUNTER — OFFICE VISIT (OUTPATIENT)
Dept: FAMILY MEDICINE | Facility: CLINIC | Age: 58
End: 2018-03-06

## 2018-03-06 VITALS
DIASTOLIC BLOOD PRESSURE: 72 MMHG | WEIGHT: 160.6 LBS | TEMPERATURE: 98.4 F | SYSTOLIC BLOOD PRESSURE: 108 MMHG | HEART RATE: 88 BPM | BODY MASS INDEX: 26.73 KG/M2 | RESPIRATION RATE: 16 BRPM | OXYGEN SATURATION: 92 %

## 2018-03-06 DIAGNOSIS — K57.20 DIVERTICULITIS OF LARGE INTESTINE WITH ABSCESS WITHOUT BLEEDING: ICD-10-CM

## 2018-03-06 DIAGNOSIS — K57.32 DIVERTICULITIS OF COLON: ICD-10-CM

## 2018-03-06 DIAGNOSIS — R10.32 ABDOMINAL PAIN, LEFT LOWER QUADRANT: Primary | ICD-10-CM

## 2018-03-06 PROBLEM — K57.92 ACUTE DIVERTICULITIS: Status: ACTIVE | Noted: 2018-03-06

## 2018-03-06 LAB
% GRANULOCYTES: 76.8 % (ref 42.2–75.2)
ALBUMIN SERPL-MCNC: 3.4 G/DL (ref 3.4–5)
ALP SERPL-CCNC: 57 U/L (ref 40–150)
ALT SERPL W P-5'-P-CCNC: 20 U/L (ref 0–50)
ANION GAP SERPL CALCULATED.3IONS-SCNC: 7 MMOL/L (ref 3–14)
AST SERPL W P-5'-P-CCNC: 13 U/L (ref 0–45)
BILIRUB SERPL-MCNC: 0.3 MG/DL (ref 0.2–1.3)
BUN SERPL-MCNC: 12 MG/DL (ref 7–30)
CALCIUM SERPL-MCNC: 8.9 MG/DL (ref 8.5–10.1)
CHLORIDE SERPL-SCNC: 100 MMOL/L (ref 94–109)
CO2 SERPL-SCNC: 27 MMOL/L (ref 20–32)
CREAT SERPL-MCNC: 0.6 MG/DL (ref 0.52–1.04)
GFR SERPL CREATININE-BSD FRML MDRD: >90 ML/MIN/1.7M2
GLUCOSE SERPL-MCNC: 100 MG/DL (ref 70–99)
HCT VFR BLD AUTO: 40.9 % (ref 35–46)
HEMOGLOBIN: 13.6 G/DL (ref 11.8–15.5)
INR PPP: 1.08 (ref 0.86–1.14)
LACTATE BLD-SCNC: 0.8 MMOL/L (ref 0.7–2)
LYMPHOCYTES NFR BLD AUTO: 18.4 % (ref 20.5–51.1)
MCH RBC QN AUTO: 31.5 PG (ref 27–31)
MCHC RBC AUTO-ENTMCNC: 33.3 G/DL (ref 33–37)
MCV RBC AUTO: 94.5 FL (ref 80–100)
MONOCYTES NFR BLD AUTO: 4.8 % (ref 1.7–9.3)
PLATELET # BLD AUTO: 324 K/UL (ref 140–450)
POTASSIUM SERPL-SCNC: 3.6 MMOL/L (ref 3.4–5.3)
PROT SERPL-MCNC: 7.6 G/DL (ref 6.8–8.8)
RBC # BLD AUTO: 4.32 X10/CMM (ref 3.7–5.2)
SODIUM SERPL-SCNC: 134 MMOL/L (ref 133–144)
WBC # BLD AUTO: 13.1 X10/CMM (ref 3.8–11)

## 2018-03-06 PROCEDURE — 36415 COLL VENOUS BLD VENIPUNCTURE: CPT | Performed by: FAMILY MEDICINE

## 2018-03-06 PROCEDURE — 99285 EMERGENCY DEPT VISIT HI MDM: CPT

## 2018-03-06 PROCEDURE — 85610 PROTHROMBIN TIME: CPT | Performed by: EMERGENCY MEDICINE

## 2018-03-06 PROCEDURE — 80053 COMPREHEN METABOLIC PANEL: CPT | Performed by: EMERGENCY MEDICINE

## 2018-03-06 PROCEDURE — 25000128 H RX IP 250 OP 636: Performed by: HOSPITALIST

## 2018-03-06 PROCEDURE — 80053 COMPREHEN METABOLIC PANEL: CPT | Mod: 90 | Performed by: FAMILY MEDICINE

## 2018-03-06 PROCEDURE — 12000000 ZZH R&B MED SURG/OB

## 2018-03-06 PROCEDURE — 99222 1ST HOSP IP/OBS MODERATE 55: CPT | Mod: AI | Performed by: HOSPITALIST

## 2018-03-06 PROCEDURE — 87040 BLOOD CULTURE FOR BACTERIA: CPT | Performed by: EMERGENCY MEDICINE

## 2018-03-06 PROCEDURE — 36415 COLL VENOUS BLD VENIPUNCTURE: CPT

## 2018-03-06 PROCEDURE — 25000132 ZZH RX MED GY IP 250 OP 250 PS 637: Performed by: HOSPITALIST

## 2018-03-06 PROCEDURE — 25000128 H RX IP 250 OP 636: Performed by: EMERGENCY MEDICINE

## 2018-03-06 PROCEDURE — 83605 ASSAY OF LACTIC ACID: CPT | Performed by: EMERGENCY MEDICINE

## 2018-03-06 PROCEDURE — 85025 COMPLETE CBC W/AUTO DIFF WBC: CPT | Performed by: FAMILY MEDICINE

## 2018-03-06 PROCEDURE — 99214 OFFICE O/P EST MOD 30 MIN: CPT | Performed by: FAMILY MEDICINE

## 2018-03-06 PROCEDURE — 96365 THER/PROPH/DIAG IV INF INIT: CPT

## 2018-03-06 RX ORDER — HYDROMORPHONE HYDROCHLORIDE 1 MG/ML
.3-.5 INJECTION, SOLUTION INTRAMUSCULAR; INTRAVENOUS; SUBCUTANEOUS
Status: DISCONTINUED | OUTPATIENT
Start: 2018-03-06 | End: 2018-03-09 | Stop reason: HOSPADM

## 2018-03-06 RX ORDER — METRONIDAZOLE 500 MG/1
500 TABLET ORAL 3 TIMES DAILY
Qty: 30 TABLET | Refills: 0 | Status: ON HOLD | OUTPATIENT
Start: 2018-03-06 | End: 2018-03-09

## 2018-03-06 RX ORDER — AMOXICILLIN 250 MG
2 CAPSULE ORAL 2 TIMES DAILY PRN
Status: DISCONTINUED | OUTPATIENT
Start: 2018-03-06 | End: 2018-03-09 | Stop reason: HOSPADM

## 2018-03-06 RX ORDER — PIPERACILLIN SODIUM, TAZOBACTAM SODIUM 3; .375 G/15ML; G/15ML
3.38 INJECTION, POWDER, LYOPHILIZED, FOR SOLUTION INTRAVENOUS EVERY 6 HOURS
Status: DISCONTINUED | OUTPATIENT
Start: 2018-03-07 | End: 2018-03-09 | Stop reason: HOSPADM

## 2018-03-06 RX ORDER — NALOXONE HYDROCHLORIDE 0.4 MG/ML
.1-.4 INJECTION, SOLUTION INTRAMUSCULAR; INTRAVENOUS; SUBCUTANEOUS
Status: DISCONTINUED | OUTPATIENT
Start: 2018-03-06 | End: 2018-03-09 | Stop reason: HOSPADM

## 2018-03-06 RX ORDER — PROCHLORPERAZINE MALEATE 10 MG
10 TABLET ORAL EVERY 6 HOURS PRN
Status: DISCONTINUED | OUTPATIENT
Start: 2018-03-06 | End: 2018-03-09 | Stop reason: HOSPADM

## 2018-03-06 RX ORDER — SULFAMETHOXAZOLE/TRIMETHOPRIM 800-160 MG
1 TABLET ORAL 2 TIMES DAILY
Qty: 20 TABLET | Refills: 0 | Status: ON HOLD | OUTPATIENT
Start: 2018-03-06 | End: 2018-03-09

## 2018-03-06 RX ORDER — OXYCODONE AND ACETAMINOPHEN 5; 325 MG/1; MG/1
1-2 TABLET ORAL EVERY 4 HOURS PRN
Status: DISCONTINUED | OUTPATIENT
Start: 2018-03-06 | End: 2018-03-09 | Stop reason: HOSPADM

## 2018-03-06 RX ORDER — SODIUM CHLORIDE 9 MG/ML
INJECTION, SOLUTION INTRAVENOUS CONTINUOUS
Status: DISCONTINUED | OUTPATIENT
Start: 2018-03-06 | End: 2018-03-08

## 2018-03-06 RX ORDER — AMOXICILLIN 250 MG
1 CAPSULE ORAL 2 TIMES DAILY PRN
Status: DISCONTINUED | OUTPATIENT
Start: 2018-03-06 | End: 2018-03-09 | Stop reason: HOSPADM

## 2018-03-06 RX ORDER — ACETAMINOPHEN 325 MG/1
650 TABLET ORAL EVERY 4 HOURS PRN
Status: DISCONTINUED | OUTPATIENT
Start: 2018-03-06 | End: 2018-03-09 | Stop reason: HOSPADM

## 2018-03-06 RX ORDER — PIPERACILLIN SODIUM, TAZOBACTAM SODIUM 3; .375 G/15ML; G/15ML
3.38 INJECTION, POWDER, LYOPHILIZED, FOR SOLUTION INTRAVENOUS ONCE
Status: COMPLETED | OUTPATIENT
Start: 2018-03-06 | End: 2018-03-06

## 2018-03-06 RX ORDER — PROCHLORPERAZINE 25 MG
25 SUPPOSITORY, RECTAL RECTAL EVERY 12 HOURS PRN
Status: DISCONTINUED | OUTPATIENT
Start: 2018-03-06 | End: 2018-03-09 | Stop reason: HOSPADM

## 2018-03-06 RX ORDER — ONDANSETRON 2 MG/ML
4 INJECTION INTRAMUSCULAR; INTRAVENOUS EVERY 6 HOURS PRN
Status: DISCONTINUED | OUTPATIENT
Start: 2018-03-06 | End: 2018-03-09 | Stop reason: HOSPADM

## 2018-03-06 RX ORDER — BISACODYL 10 MG
10 SUPPOSITORY, RECTAL RECTAL DAILY PRN
Status: DISCONTINUED | OUTPATIENT
Start: 2018-03-06 | End: 2018-03-09 | Stop reason: HOSPADM

## 2018-03-06 RX ORDER — ONDANSETRON 4 MG/1
4 TABLET, ORALLY DISINTEGRATING ORAL EVERY 6 HOURS PRN
Status: DISCONTINUED | OUTPATIENT
Start: 2018-03-06 | End: 2018-03-09 | Stop reason: HOSPADM

## 2018-03-06 RX ADMIN — SODIUM CHLORIDE: 900 INJECTION INTRAVENOUS at 20:06

## 2018-03-06 RX ADMIN — SODIUM CHLORIDE 1000 ML: 9 INJECTION, SOLUTION INTRAVENOUS at 17:49

## 2018-03-06 RX ADMIN — ACETAMINOPHEN 650 MG: 325 TABLET, FILM COATED ORAL at 20:06

## 2018-03-06 RX ADMIN — PIPERACILLIN SODIUM,TAZOBACTAM SODIUM 3.38 G: 3; .375 INJECTION, POWDER, FOR SOLUTION INTRAVENOUS at 17:51

## 2018-03-06 ASSESSMENT — ENCOUNTER SYMPTOMS
FATIGUE: 1
MYALGIAS: 1
FEVER: 0
ABDOMINAL PAIN: 1
NAUSEA: 0
VOMITING: 0

## 2018-03-06 ASSESSMENT — PAIN DESCRIPTION - DESCRIPTORS: DESCRIPTORS: DULL

## 2018-03-06 ASSESSMENT — PAIN SCALES - GENERAL: PAINLEVEL: SEVERE PAIN (6)

## 2018-03-06 NOTE — PROGRESS NOTES
Problem(s) Oriented visit        SUBJECTIVE:                                                    Leticia Tompkins is a 57 year old female who presents to clinic today for the following health issues :    1. Abdominal pain, left lower quadrant  ABDOMINAL PAIN     Onset: 4 pm last Saturday    Description:   Character: Sharp  Location: left lower quadrant  Radiation: None    Intensity: severe    Progression of Symptoms: worse    Accompanying Signs & Symptoms:  Fever/Chills: no   Nausea: no   Vomiting: no   Diarrhea: no   Dysuria or Hematuria: no    History:   Trauma: no   Previous similar pain: no    Previous tests done: none    Precipitating factors:   Does the pain change with:     Food: no      BM: no     Urination: no     Alleviating factors:         Therapies Tried and outcome: none        - Referral to [facility]  - Comp. Metabolic Panel (14) ([facility])  - CBC with Diff/Plt ([facility])  - sulfamethoxazole-trimethoprim (BACTRIM DS/SEPTRA DS) 800-160 MG per tablet; Take 1 tablet by mouth 2 times daily  Dispense: 20 tablet; Refill: 0  - metroNIDAZOLE (FLAGYL) 500 MG tablet; Take 1 tablet (500 mg) by mouth 3 times daily  Dispense: 30 tablet; Refill: 0         Problem list, Medication list, Allergies, and Medical/Social/Surgical histories reviewed in EPIC and updated as appropriate.   Additional history: as documented    ROS:  General and CV completed and negative except as noted above    Histories:   Patient Active Problem List   Diagnosis     Lumbago     Carotene pigmentation of skin     CARDIOVASCULAR SCREENING; LDL GOAL LESS THAN 160     Health Care Home     Stress incontinence of urine     Cystocele, midline     Past Surgical History:   Procedure Laterality Date     BLADDER SURGERY       C NONSPECIFIC PROCEDURE  1991    Tubal ligation     C NONSPECIFIC PROCEDURE      Lasik eye surgery     C NONSPECIFIC PROCEDURE  3/07     L5-S1 discectomy     CYSTOCELE REPAIR N/A 0/2013     HYSTERECTOMY N/A 05/20013     ORTHOPEDIC  SURGERY Left 4-19-16    left foot bunionectomy     RECTOCELE REPAIR N/A 05/2013       Social History   Substance Use Topics     Smoking status: Former Smoker     Types: Cigarettes     Quit date: 3/12/1983     Smokeless tobacco: Never Used     Alcohol use 0.0 - 1.5 oz/week     0 - 3 Glasses of wine per week     Family History   Problem Relation Age of Onset     DIABETES Mother      Cardiovascular Mother      a fib     Lipids Mother      Melanoma Father 77     Neuromuscular Disease Brother      severe progressive MS           OBJECTIVE:                                                    /72  Pulse 88  Temp 98.4  F (36.9  C) (Oral)  Resp 16  Wt 72.8 kg (160 lb 9.6 oz)  SpO2 92%  BMI 26.73 kg/m2  Body mass index is 26.73 kg/(m^2).   APPEARANCE: = Relaxed and in no distress  Conj/Eyelids = noninjected and lids and lashes are without inflammation  PERRLA/Irises = Pupils Round Reactive to Light and Irisis without inflammation  Neck = No anterior or posterior adenopathy appreciated.  Thyroid = Not enlarged and no masses felt  Resp effort = Calm regular breathing  Breath Sounds = Good air movement with no rales or rhonchi in any lung fields  Heart Rate, Rythym, & sounds (no Murm)  = Regular rate and rythym with no S3, S4, or murmer appreciated.  Carotid Art's = Pulses full and equal and no bruits appreciated  Abdomen: soft, nontender, no masses, & bowel sounds = tenderness marked, voluntary guarding and possible light rebound present LLQ, fullness  located LLQ,   Liver/Spleen = Normal span and no splenomegaly noted  Digits and Nails = FROM in all finger joints, no nail dystrophy  Ext (edema) = No pretibial edema noted or elsewhere  Musculsktl =  Strength and ROM of major joints are within normal limits  SKIN = absent significant rashes or lesions   Recent/Remote Memory = Alert and Oriented x 3  Mood/Affect = Cooperative and interested     ASSESSMENT/PLAN:                                                         Leticia was seen today for sick and abdominal pain.    Diagnoses and all orders for this visit:    Abdominal pain, left lower quadrant  -     Referral to [facility]  -     Comp. Metabolic Panel (14) ([facility])  -     CBC with Diff/Plt ([facility])  -     sulfamethoxazole-trimethoprim (BACTRIM DS/SEPTRA DS) 800-160 MG per tablet; Take 1 tablet by mouth 2 times daily  -     metroNIDAZOLE (FLAGYL) 500 MG tablet; Take 1 tablet (500 mg) by mouth 3 times daily      Get CT now, next steps pending results.    See Patient Instructions    The following health maintenance items are reviewed in Epic and correct as of today:  Health Maintenance   Topic Date Due     HEPATITIS C SCREENING  09/28/1976     ADVANCE DIRECTIVE PLANNING Q5 YRS  09/28/2013     PAP SCREENING Q3 YR (SYSTEM ASSIGNED)  05/29/2018     INFLUENZA VACCINE (SYSTEM ASSIGNED)  09/01/2018     MAMMO SCREEN Q2 YR (SYSTEM ASSIGNED)  03/06/2019     LIPID SCREEN Q5 YR FEMALE (SYSTEM ASSIGNED)  05/29/2020     COLON CANCER SCREEN (SYSTEM ASSIGNED)  03/26/2023     TETANUS IMMUNIZATION (SYSTEM ASSIGNED)  04/05/2026       Lc Michael MD  [facility]  Family Practice  [facility]  [phone]    For any issues my office # is [phone]

## 2018-03-06 NOTE — ED PROVIDER NOTES
History     Chief Complaint:  Abdominal Pain    The history is provided by the patient.      Leticia Tompkins is a 57 year old female who presents to the emergency department today for evaluation of abdominal pain.  The patient presents from clinic, after seeing Dr. Lc Michael with concern for the flu. Prior to this weekend, the patient reports being well. On 3/3/18 around 1600, the patient laid down for a nap and did not get up for the next 24 hours. She now has myalgias, fatigue, and abdominal pain, but no nausea or vomiting. She reports having healthy urinary and bowel symptoms and has been eating normally. She has had prior colonoscopies showing diverticula. She further denies having had a history of diverticulitis, fever (although slightly warm), recent travel, and recent antibiotics.     CT abdominal pelvis with contrast 3/6/18 1627  1. Distal descending colon and sigmoid colon complex diverticulitis at the anterior left lower quadrant. There is an abscess at this location measuring 3.2 cm that appears to involve both the colonic wall and extends into the left abdominis rectus muscle.  2. No other acute abnormalities.     Allergies:  No Known Drug Allergies     Medications:    sulfamethoxazole-trimethoprim (BACTRIM DS/SEPTRA DS) 800-160 MG per tablet  metroNIDAZOLE (FLAGYL) 500 MG tablet  IBUPROFEN PO    Past Medical History:    Depressive disorder, not elsewhere classified   Lumbago   Spider veins   Tachycardia, unspecified    Past Surgical History:    Bladder surgery  Tubal ligation  Lasik eye surgery  L5-S1 discectomy  Cystocele repair  Hysterectomy  Left foot bunionectomy  Foot surgery  Rectocele repair    Family History:    Diabetes Mother   A fib  Mother   Lipids  Mother   Melanoma Father   MS  Brother     Social History:  The patient was accompanied to the ED by .  Smoking Status: Former Smoker  Smokeless Tobacco: Never Used  Alcohol Use: Positive   Marital Status:       Review of Systems  "  Constitutional: Positive for fatigue. Negative for fever.   Gastrointestinal: Positive for abdominal pain. Negative for nausea and vomiting.   Musculoskeletal: Positive for myalgias.   All other systems reviewed and are negative.    Physical Exam     Patient Vitals for the past 24 hrs:   BP Temp Temp src Heart Rate Resp SpO2 Height Weight   03/06/18 1706 138/74 99.1  F (37.3  C) Oral 87 16 96 % 1.664 m (5' 5.5\") 69.9 kg (154 lb)      Physical Exam  General: Patient is alert and uncomfortable appearing.  HEENT: Head atraumatic    Eyes: pupils equal and reactive. Conjunctiva clear   Nares: patent   Oropharynx: no lesions, uvula midline, no palatal draping, normal voice, no trismus  Neck: Supple without lymphadenopathy, no meningismus  Chest: Heart regular rate and rhythm.   Lungs: Equal clear to auscultation with no wheeze or rales  Abdomen: Soft, LLQ tenderness with palpable firmness overlying rectus abdominus muscle, localized guarding, no rebound, nondistended, normal bowel sounds  Back: No costovertebral angle tenderness, no midline C, T or L spine tenderness  Neuro: Grossly nonfocal, normal speech, strength equal bilaterally, CN 2-12 intact  Extremities: No deformities, equal radial and DP pulses. No clubbing, cyanosis.  No edema  Skin: Warm and dry with no rash.       Emergency Department Course     Laboratory:  Laboratory findings were communicated with the patient who voiced understanding of the findings.    Blood culture: Pending  INR (1731): 1.08  CMP: Glucose: 100 o/w WNL (Creatinine 0.60)  Blood Culture: Pending  Lactic Acid Whole Blood (1731): 0.8    Interventions:  1749 NS, 1 L, IV   1751 Zosyn 3.375 g in 100 mL NS    Emergency Department Course:    1706 Nursing notes and vitals reviewed.    1720 I performed an exam of the patient as documented above.     1734 IV was inserted and blood was drawn for laboratory testing, results above.     1846 I spoke with Dr. Valles of surgery regarding patient's " presentation, findings, and plan of care. I spoke with Dr. Melgar of the Hospitalist service regarding patient's presentation, findings, and plan of care.      1900 I personally reviewed the lab results with the patient and answered all related questions prior to admission to Dr. Melgar.    Impression & Plan      Medical Decision Making:  Leticia Tompkins is a 57 year old female who presents to the emergency department today for evaluation of left lower quadrant abdominal pain.  Patient started having flulike symptoms including mild headache, fatigue on Saturday night which progressed to include left lower quadrant pain yesterday.  She denies diarrhea or bloody stools or fever.  She saw her primary care provider today who noted a leukocytosis and center for outpatient CT scan.  Outpatient CT scan reveals acute diverticulitis with abscess tracking from the colonic wall to the left rectus abdominis muscle.  The abscesses palpable on examination.  I discussed with Dr. Damico on call for interventional radiology who will assess the patient for IR drainage tomorrow.  In addition I made general surgery where at the preference of hospitalist.  Patient was given Zosyn and blood cultures were drawn prior to antibiotics.  Patient thankfully shows no signs of sepsis including a normal lactic acid, blood pressure and heart rate.  Patient is not requesting pain medication here in the emergency department.  We will plan to admit to the hospitalist service for antibiotics IR consultation and further workup and management.    Diagnosis:    ICD-10-CM    1. Diverticulitis of colon K57.32    2. Diverticulitis of large intestine with abscess without bleeding K57.20      Disposition:   Admission    Scribe Disclosure:  Bam CUELLAR, am serving as a scribe at 5:16 PM on 3/6/2018 to document services personally performed by Samanta Pisano MD based on my observations and the provider's statements to me.      EMERGENCY  DEPARTMENT       [facility], Samanta Modi MD  03/06/18 1934

## 2018-03-06 NOTE — IP AVS SNAPSHOT
[facility] [address] [facility]    [address] [place] [address]    Phone:  [phone]                                       After Visit Summary   3/6/2018    Leticia Tompkins    MRN: [id number]           After Visit Summary Signature Page     I have received my discharge instructions, and my questions have been answered. I have discussed any challenges I see with this plan with the nurse or doctor.    ..........................................................................................................................................  Patient/Patient Representative Signature      ..........................................................................................................................................  Patient Representative Print Name and Relationship to Patient    ..................................................               ................................................  Date                                            Time    ..........................................................................................................................................  Reviewed by Signature/Title    ...................................................              ..............................................  Date                                                            Time

## 2018-03-06 NOTE — IP AVS SNAPSHOT
MRN:[id number]                      After Visit Summary   3/6/2018    Leticia Tompkins    MRN: [id number]           Thank you!     Thank you for choosing [facility] for your care. Our goal is always to provide you with excellent care. Hearing back from our patients is one way we can continue to improve our services. Please take a few minutes to complete the written survey that you may receive in the mail after you visit with us. Thank you!        Patient Information     Date Of Birth          1960        Designated Caregiver       Most Recent Value    Caregiver    Will someone help with your care after discharge? yes    Name of designated caregiver Ed walter    Phone number of caregiver [phone]    Caregiver [place] [address] [place] [address]      About your hospital stay     You were admitted on:  March 6, 2018 You last received care in the:  [facility] [address] [facility]    You were discharged on:  March 9, 2018        Reason for your hospital stay       You were admitted with diverticular abscess                  Who to Call     For medical emergencies, please call 911.  For non-urgent questions about your medical care, please call your primary care provider or clinic, [phone]          Attending Provider     Provider Specialty    Samanta Pisano MD Emergency Medicine    [facility]       Primary Care Provider Office Phone # Fax #    Toni Gan [phone]       When to contact your care team       Call your primary doctor if you have any of the following: increased pain.                  After Care Instructions     Activity       Your activity upon discharge: activity as tolerated            Diet       Low fiber x3 weeks, then high fiber diet.  Consider low carb diet            Discharge Instructions       Prediabetes.hemoglobin A1c 6.2- monitor blood sugars periodically.  Consider lifestyle modification with  "diet and exercise                  Follow-up Appointments     Follow-up and recommended labs and tests        Follow up with primary care provider, Toni Gan, within 7 days for hospital follow- up.  No follow up labs or test are needed.                  Pending Results     Date and Time Order Name Status Description    3/7/2018 0935 Abscess Culture Aerobic Bacterial Preliminary     3/6/2018 1727 Blood culture Preliminary     3/6/2018 1727 Blood culture Preliminary             Statement of Approval     Ordered          03/09/18 0936  I have reviewed and agree with all the recommendations and orders detailed in this document.  EFFECTIVE NOW     Approved and electronically signed by:  Lauri Winkler MD             Admission Information     Date & Time Provider Department Dept. Phone    3/6/2018 Patricio Melgar MD [facility] [address] [facility] [phone]      Your Vitals Were     Blood Pressure Pulse Temperature Respirations Height Weight    140/83 (BP Location: Left arm) 79 98  F (36.7  C) (Oral) 18 1.664 m (5' 5.5\") 69.9 kg (154 lb)    Pulse Oximetry BMI (Body Mass Index)                94% 25.24 kg/m2          QuantConnecthart Information     [facility] gives you secure access to your electronic health record. If you see a primary care provider, you can also send messages to your care team and make appointments. If you have questions, please call your primary care clinic.  If you do not have a primary care provider, please call [phone] and they will assist you.        Care EveryWhere ID     This is your Care EveryWhere ID. This could be used by other organizations to access your [facility] medical records  [id number]        Equal Access to Services     JANELLE ALEMAN : Alyson Augustin, waaramda luqadaha, qaybta kaalmada estuardo rosario. So [facility] [phone].    ATENCIÓN: Si habla español, tiene a coleman disposición servicios gratuitos de " aguedabobby Garg al [phone].    We comply with applicable federal civil rights laws and [place] laws. We do not discriminate on the basis of race, color, national origin, age, disability, sex, sexual orientation, or gender identity.               Review of your medicines      START taking        Dose / Directions    ciprofloxacin 500 MG tablet   Commonly known as:  CIPRO   Used for:  Diverticulitis of large intestine with abscess without bleeding        Dose:  500 mg   Take 1 tablet (500 mg) by mouth 2 times daily for 10 days   Quantity:  20 tablet   Refills:  0         CONTINUE these medicines which have NOT CHANGED        Dose / Directions    IBUPROFEN PO        Dose:  200-400 mg   Take 200-400 mg by mouth as needed for moderate pain Reported on 2/22/2017   Refills:  0       metroNIDAZOLE 500 MG tablet   Commonly known as:  FLAGYL   Used for:  Diverticulitis of large intestine with abscess without bleeding        Dose:  500 mg   Take 1 tablet (500 mg) by mouth 3 times daily for 10 days   Quantity:  30 tablet   Refills:  0         STOP taking     sulfamethoxazole-trimethoprim 800-160 MG per tablet   Commonly known as:  BACTRIM DS/SEPTRA DS                Where to get your medicines      These medications were sent to [facility] [place] - [address]  [address], [facility] [address]     Phone:  [phone]     ciprofloxacin 500 MG tablet    metroNIDAZOLE 500 MG tablet                Protect others around you: Learn how to safely use, store and throw away your medicines at www.disposemymeds.org.        ANTIBIOTIC INSTRUCTION     You've Been Prescribed an Antibiotic - Now What?  Your healthcare team thinks that you or your loved one might have an infection. Some infections can be treated with antibiotics, which are powerful, life-saving drugs. Like all medications, antibiotics have side effects and should only be used when necessary. There are some important  things you should know about your antibiotic treatment.      Your healthcare team may run tests before you start taking an antibiotic.    Your team may take samples (e.g., from your blood, urine or other areas) to run tests to look for bacteria. These test can be important to determine if you need an antibiotic at all and, if you do, which antibiotic will work best.      Within a few days, your healthcare team might change or even stop your antibiotic.    Your team may start you on an antibiotic while they are working to find out what is making you sick.    Your team might change your antibiotic because test results show that a different antibiotic would be better to treat your infection.    In some cases, once your team has more information, they learn that you do not need an antibiotic at all. They may find out that you don't have an infection, or that the antibiotic you're taking won't work against your infection. For example, an infection caused by a virus can't be treated with antibiotics. Staying on an antibiotic when you don't need it is more likely to be harmful than helpful.      You may experience side effects from your antibiotic.    Like all medications, antibiotics have side effects. Some of these can be serious.    Let you healthcare team know if you have any known allergies when you are admitted to the hospital.    One significant side effect of nearly all antibiotics is the risk of severe and sometimes deadly diarrhea caused by Clostridium difficile (C. Difficile). This occurs when a person takes antibiotics because some good germs are destroyed. Antibiotic use allows C. diificile to take over, putting patients at high risk for this serious infection.    As a patient or caregiver, it is important to understand your or your loved one's antibiotic treatment. It is especially important for caregivers to speak up when patients can't speak for themselves. Here are some important questions to ask your  healthcare team.    What infection is this antibiotic treating and how do you know I have that infection?    What side effects might occur from this antibiotic?    How long will I need to take this antibiotic?    Is it safe to take this antibiotic with other medications or supplements (e.g., vitamins) that I am taking?     Are there any special directions I need to know about taking this antibiotic? For example, should I take it with food?    How will I be monitored to know whether my infection is responding to the antibiotic?    What tests may help to make sure the right antibiotic is prescribed for me?      Information provided by:  www.cdc.gov/getsmart  U.S. Department of Health and Human Services  Centers for disease Control and Prevention  National Center for Emerging and Zoonotic Infectious Diseases  Division of Healthcare Quality Promotion             Medication List: This is a list of all your medications and when to take them. Check marks below indicate your daily home schedule. Keep this list as a reference.      Medications           Morning Afternoon Evening Bedtime As Needed    ciprofloxacin 500 MG tablet   Commonly known as:  CIPRO   Take 1 tablet (500 mg) by mouth 2 times daily for 10 days   Next Dose Due:  Today 3/9/18                                      IBUPROFEN PO   Take 200-400 mg by mouth as needed for moderate pain Reported on 2/22/2017                                   metroNIDAZOLE 500 MG tablet   Commonly known as:  FLAGYL   Take 1 tablet (500 mg) by mouth 3 times daily for 10 days   Next Dose Due:  Today 3/9/18                                                   More Information        Discharge Instructions for Diverticulitis  You have been diagnosed with diverticulitis. This is a condition in which small pouches form in your colon (large intestine) and become inflamed or infected. Follow the guidelines below for home care.  As you recover  Tips for recovery include:    Eat a low-fiber  diet. Your healthcare provider may advise a liquid diet. This gives your bowel a chance to rest so that it can recover.    Foods to include: flake cereal, mashed potatoes, pancakes, waffles, pasta, white bread, rice, applesauce, bananas, eggs, fish, poultry, tofu, and well-cooked vegetables    Take your medicines as directed. Do not stop taking the medicines, even if you feel better.    Monitor your temperature and report any rising temperature to your healthcare provider.    Take antibiotics exactly as directed. Do not miss any and keep taking them even if you feel better.     Drink 6 to 8 glasses of water every day, unless directed otherwise.    Use a heating pad or hot water bottle to reduce abdominal cramping or pain.  Preventing diverticulitis in the future  Tips for prevention include:    Eat a high-fiber diet. Fiber adds bulk to the stool so that it passes through the large intestine more easily.    Keep drinking 6 to 8 glasses of water every day, unless directed otherwise.    Begin an exercise program. Ask your healthcare provider how to get started. You can benefit from simple activities such as walking or gardening.    Treat diarrhea with a bland diet. Start with liquids only, then slowly add fiber over time.    Watch for changes in your bowel movements (constipation to diarrhea).    Avoid constipation with fiber and add a stool softener if needed.     Get plenty of rest and sleep.  Follow-up care  Make a follow-up appointment as directed by our staff.  When to call your healthcare provider  Call your healthcare provider immediately if you have any of the following:    Fever of 100.4 F (38.0 C) or higher, or as directed by your healthcare provider    Chills    Severe cramps in the belly, most commonly the lower left side    Tenderness in the belly, most commonly the lower left side    Nausea and vomiting    Bleeding from your rectum   Date Last Reviewed: 7/1/2016 2000-2017 [employer] [address]  [place] [address]. All rights reserved. This information is not intended as a substitute for professional medical care. Always follow your healthcare professional's instructions.              Eating a Low-fiber Diet  What is fiber?  Fiber is the part of food that the body cannot digest. It helps form stools (bowel movements).   If you eat less fiber, you may:    Reduce belly pain, diarrhea (loose, watery stools) and other digestive problems    Have fewer and smaller stools    Decrease inflammation (pain, redness and swelling) in the GI (gastro-intestinal) tract    Promote healing in the GI tract.  For a list of foods allowed in a low-fiber diet, see the back of this page.  Why might I need a low-fiber diet?  You may need a low-fiber diet if you have:     Inflamed bowels    Crohn's disease    Diverticular disease    Ulcerative colitis    Radiation therapy to the belly area    Chemotherapy    An upcoming colonoscopy    Surgery on your intestines or in the belly area.  Sample Menu  Breakfast:   1 scrambled egg   1 slice white toast with 1 teaspoon margarine     cup Cream of Wheat with sugar     cup milk      cup pulp-free orange juice  Snack:     cup canned fruit cocktail (in juice)   6 saltine crackers  Lunch:   Tuna sandwich on white bread    1 cup cream of chicken soup     cup canned peaches (in light syrup)   1 cup lemonade  Snack:     cup cottage cheese   1 medium apple, sliced and peeled  Dinner:   3 ounces well-cooked chicken breast   1 cup white rice     cup cooked canned carrots   1 white dinner roll with 1 teaspoon margarine   1 slice jordan food cake   1 cup herbal tea  Food group Allowed Avoid   Grains Foods that contain refined white flour   (1 gram fiber or less per serving), such as bread, pasta, muffins, cereals, crackers, etc.; white rice; Cream of Wheat; Cream of Rice Whole grains (whole wheat bread, oatmeal, barley, brown or wild rice); foods containing nuts, seeds or bran  "  Vegetables Canned or well-cooked vegetables; mashed potatoes; non-gas-forming vegetables; vegetables without skin, seeds or pulp; vegetable juice ( 1/2 cup per day or less) Raw vegetables; cooked greens or spinach;   gas-forming vegetables (broccoli, cauliflower, brussels sprouts)   Fruits Peeled fresh fruit (bananas, apples, melons, nectarines); canned fruit (in juice or light syrup); fruit juice without pulp Dried fruit; fruit with pulp (oranges, grapefruit, pineapple); unpeeled fruit; prune juice   Meats and   other proteins Tender, well-cooked or ground meats; fish; eggs; tofu; smooth nut butters (peanut, soy, almond, sunflower) Crunchy nut butters; tough meats; meats with gristle (patel, sausage); dried beans or peas (legumes)   Milk products Milk, soy milk, rice milk, almond milk, coconut milk; yogurt, soy yogurt; cottage cheese, mild cheese; ice cream, sherbet If you are lactose intolerant: avoid milk, dairy products and foods made with milk  Note: Some people become lactose intolerant after surgery. This may or may not improve over time.   Other Salad dressings; oil, butter, margarine; jelly, honey, syrup Any food containing nuts or seeds; coconut; marmalade; carbonated (\"fizzy\") drinks   For informational purposes only. Not to replace the advice of your health care provider.   Copyright   2007 [facility]. All rights reserved. [employer] 412619 - REV 09/15.         "

## 2018-03-06 NOTE — MR AVS SNAPSHOT
After Visit Summary   3/6/2018    Leticia Tompkins    MRN: [id number]           Patient Information     Date Of Birth          1960        Visit Information        Provider Department      3/6/2018 1:45 PM Lc Michael MD [facility]        Today's Diagnoses     Abdominal pain, left lower quadrant    -  1       Follow-ups after your visit        Additional Services     Referral to [facility]       Referral to [facility]  Phone: [phone]  Fax: [phone]  Reason for referral: CT of abdomen and pelvis  Dx rebound left lower Q pain                  Who to contact     If you have questions or need follow up information about today's clinic visit or your schedule please contact [facility] directly at [phone].  Normal or non-critical lab and imaging results will be communicated to you by Nanalihart, letter or phone within 4 business days after the clinic has received the results. If you do not hear from us within 7 days, please contact the clinic through Nanalihart or phone. If you have a critical or abnormal lab result, we will notify you by phone as soon as possible.  Submit refill requests through FIRE1 or call your pharmacy and they will forward the refill request to us. Please allow 3 business days for your refill to be completed.          Additional Information About Your Visit        MyChart Information     [employer] gives you secure access to your electronic health record. If you see a primary care provider, you can also send messages to your care team and make appointments. If you have questions, please call your primary care clinic.  If you do not have a primary care provider, please call [phone] and they will assist you.        Care EveryWhere ID     This is your Care EveryWhere ID. This could be used by other organizations to access your [facility] medical records  [id number]        Your Vitals Were     Pulse Temperature Respirations Pulse  Oximetry BMI (Body Mass Index)       88 98.4  F (36.9  C) (Oral) 16 92% 26.73 kg/m2        Blood Pressure from Last 3 Encounters:   03/06/18 108/72   02/20/18 118/74   02/22/17 110/70    Weight from Last 3 Encounters:   03/06/18 72.8 kg (160 lb 9.6 oz)   02/20/18 72.3 kg (159 lb 6.4 oz)   02/22/17 69.4 kg (153 lb)              We Performed the Following     CBC with Diff/Plt ([facility])     Comp. Metabolic Panel (14) ([facility])     [facility]          Today's Medication Changes          These changes are accurate as of 3/6/18  2:16 PM.  If you have any questions, ask your nurse or doctor.               Start taking these medicines.        Dose/Directions    metroNIDAZOLE 500 MG tablet   Commonly known as:  FLAGYL   Used for:  Abdominal pain, left lower quadrant   Started by:  Lc Michael MD        Dose:  500 mg   Take 1 tablet (500 mg) by mouth 3 times daily   Quantity:  30 tablet   Refills:  0       sulfamethoxazole-trimethoprim 800-160 MG per tablet   Commonly known as:  BACTRIM DS/SEPTRA DS   Used for:  Abdominal pain, left lower quadrant   Started by:  Lc Michael MD        Dose:  1 tablet   Take 1 tablet by mouth 2 times daily   Quantity:  20 tablet   Refills:  0            Where to get your medicines      Some of these will need a paper prescription and others can be bought over the counter.  Ask your nurse if you have questions.     Bring a paper prescription for each of these medications     metroNIDAZOLE 500 MG tablet    sulfamethoxazole-trimethoprim 800-160 MG per tablet                Primary Care Provider Office Phone # Fax #    Toni Gan [phone] [address] [facility] [address]        Equal Access to Services     [facility]: Alyson Augustin, wacurtis jean, qasharona kaallaney rosario, estuardo packer. So [facility] [phone].    ATENCIÓN: Si habla español, tiene a coleman disposición servicios  eder de asistencia lingüística. Britany kevin [phone].    We comply with applicable federal civil rights laws and [place] laws. We do not discriminate on the basis of race, color, national origin, age, disability, sex, sexual orientation, or gender identity.            Thank you!     Thank you for choosing [facility]  for your care. Our goal is always to provide you with excellent care. Hearing back from our patients is one way we can continue to improve our services. Please take a few minutes to complete the written survey that you may receive in the mail after your visit with us. Thank you!             Your Updated Medication List - Protect others around you: Learn how to safely use, store and throw away your medicines at www.disposemymeds.org.          This list is accurate as of 3/6/18  2:16 PM.  Always use your most recent med list.                   Brand Name Dispense Instructions for use Diagnosis    IBUPROFEN PO      Take 200 mg by mouth as needed for moderate pain Reported on 2/22/2017        metroNIDAZOLE 500 MG tablet    FLAGYL    30 tablet    Take 1 tablet (500 mg) by mouth 3 times daily    Abdominal pain, left lower quadrant       sulfamethoxazole-trimethoprim 800-160 MG per tablet    BACTRIM DS/SEPTRA DS    20 tablet    Take 1 tablet by mouth 2 times daily    Abdominal pain, left lower quadrant

## 2018-03-07 ENCOUNTER — APPOINTMENT (OUTPATIENT)
Dept: ULTRASOUND IMAGING | Facility: CLINIC | Age: 58
DRG: 392 | End: 2018-03-07
Attending: HOSPITALIST
Payer: COMMERCIAL

## 2018-03-07 LAB
ALBUMIN SERPL-MCNC: 4.2 G/DL (ref 3.5–5.5)
ALBUMIN/GLOB SERPL: 1.6 {RATIO} (ref 1.2–2.2)
ALP SERPL-CCNC: 58 IU/L (ref 39–117)
ALT SERPL-CCNC: 13 IU/L (ref 0–32)
ANION GAP SERPL CALCULATED.3IONS-SCNC: 9 MMOL/L (ref 3–14)
AST SERPL-CCNC: 13 IU/L (ref 0–40)
BACTERIA SPEC CULT: NORMAL
BILIRUB SERPL-MCNC: 0.4 MG/DL (ref 0–1.2)
BUN SERPL-MCNC: 11 MG/DL (ref 7–30)
BUN SERPL-MCNC: 13 MG/DL (ref 6–24)
BUN/CREATININE RATIO: 18 (ref 9–23)
CALCIUM SERPL-MCNC: 8.3 MG/DL (ref 8.5–10.1)
CALCIUM SERPL-MCNC: 9.7 MG/DL (ref 8.7–10.2)
CHLORIDE SERPL-SCNC: 105 MMOL/L (ref 94–109)
CHLORIDE SERPLBLD-SCNC: 96 MMOL/L (ref 96–106)
CO2 SERPL-SCNC: 24 MMOL/L (ref 20–32)
CREAT SERPL-MCNC: 0.6 MG/DL (ref 0.52–1.04)
CREAT SERPL-MCNC: 0.72 MG/DL (ref 0.57–1)
EGFR IF AFRICN AM: 106 ML/MIN/1.73
EGFR IF NONAFRICN AM: 92 ML/MIN/1.73
ERYTHROCYTE [DISTWIDTH] IN BLOOD BY AUTOMATED COUNT: 13.1 % (ref 10–15)
GFR SERPL CREATININE-BSD FRML MDRD: >90 ML/MIN/1.7M2
GLOBULIN, TOTAL: 2.6 G/DL (ref 1.5–4.5)
GLUCOSE SERPL-MCNC: 151 MG/DL (ref 65–99)
GLUCOSE SERPL-MCNC: 153 MG/DL (ref 70–99)
GRAM STN SPEC: ABNORMAL
HCT VFR BLD AUTO: 39 % (ref 35–47)
HGB BLD-MCNC: 13 G/DL (ref 11.7–15.7)
MCH RBC QN AUTO: 31.3 PG (ref 26.5–33)
MCHC RBC AUTO-ENTMCNC: 33.3 G/DL (ref 31.5–36.5)
MCV RBC AUTO: 94 FL (ref 78–100)
PLATELET # BLD AUTO: 366 10E9/L (ref 150–450)
POTASSIUM SERPL-SCNC: 3.7 MMOL/L (ref 3.4–5.3)
POTASSIUM SERPL-SCNC: 4.8 MMOL/L (ref 3.5–5.2)
PROT SERPL-MCNC: 6.8 G/DL (ref 6–8.5)
RBC # BLD AUTO: 4.15 10E12/L (ref 3.8–5.2)
SODIUM SERPL-SCNC: 138 MMOL/L (ref 133–144)
SODIUM SERPL-SCNC: 139 MMOL/L (ref 134–144)
SPECIMEN SOURCE: ABNORMAL
SPECIMEN SOURCE: NORMAL
TOTAL CO2: 28 MMOL/L (ref 18–28)
WBC # BLD AUTO: 12.5 10E9/L (ref 4–11)

## 2018-03-07 PROCEDURE — 25000125 ZZHC RX 250: Performed by: RADIOLOGY

## 2018-03-07 PROCEDURE — 99232 SBSQ HOSP IP/OBS MODERATE 35: CPT | Performed by: HOSPITALIST

## 2018-03-07 PROCEDURE — 80048 BASIC METABOLIC PNL TOTAL CA: CPT | Performed by: HOSPITALIST

## 2018-03-07 PROCEDURE — 85027 COMPLETE CBC AUTOMATED: CPT | Performed by: HOSPITALIST

## 2018-03-07 PROCEDURE — 0W9G3ZX DRAINAGE OF PERITONEAL CAVITY, PERCUTANEOUS APPROACH, DIAGNOSTIC: ICD-10-PCS | Performed by: RADIOLOGY

## 2018-03-07 PROCEDURE — 88112 CYTOPATH CELL ENHANCE TECH: CPT | Mod: 26 | Performed by: HOSPITALIST

## 2018-03-07 PROCEDURE — 25000128 H RX IP 250 OP 636: Performed by: HOSPITALIST

## 2018-03-07 PROCEDURE — 88112 CYTOPATH CELL ENHANCE TECH: CPT | Performed by: HOSPITALIST

## 2018-03-07 PROCEDURE — 00000155 ZZHCL STATISTIC H-CELL BLOCK W/STAIN: Performed by: HOSPITALIST

## 2018-03-07 PROCEDURE — 87077 CULTURE AEROBIC IDENTIFY: CPT | Performed by: HOSPITALIST

## 2018-03-07 PROCEDURE — 99221 1ST HOSP IP/OBS SF/LOW 40: CPT | Performed by: SURGERY

## 2018-03-07 PROCEDURE — 87070 CULTURE OTHR SPECIMN AEROBIC: CPT | Performed by: HOSPITALIST

## 2018-03-07 PROCEDURE — 88305 TISSUE EXAM BY PATHOLOGIST: CPT | Mod: 26 | Performed by: HOSPITALIST

## 2018-03-07 PROCEDURE — 12000000 ZZH R&B MED SURG/OB

## 2018-03-07 PROCEDURE — 87186 SC STD MICRODIL/AGAR DIL: CPT | Performed by: HOSPITALIST

## 2018-03-07 PROCEDURE — 10160 PNXR ASPIR ABSC HMTMA BULLA: CPT

## 2018-03-07 PROCEDURE — 87205 SMEAR GRAM STAIN: CPT | Performed by: HOSPITALIST

## 2018-03-07 PROCEDURE — 36415 COLL VENOUS BLD VENIPUNCTURE: CPT | Performed by: HOSPITALIST

## 2018-03-07 PROCEDURE — 88305 TISSUE EXAM BY PATHOLOGIST: CPT | Performed by: HOSPITALIST

## 2018-03-07 RX ORDER — LIDOCAINE HYDROCHLORIDE 10 MG/ML
3 INJECTION, SOLUTION EPIDURAL; INFILTRATION; INTRACAUDAL; PERINEURAL ONCE
Status: COMPLETED | OUTPATIENT
Start: 2018-03-07 | End: 2018-03-07

## 2018-03-07 RX ADMIN — LIDOCAINE HYDROCHLORIDE 30 MG: 10 INJECTION, SOLUTION INFILTRATION; PERINEURAL at 09:39

## 2018-03-07 RX ADMIN — PIPERACILLIN SODIUM,TAZOBACTAM SODIUM 3.38 G: 3; .375 INJECTION, POWDER, FOR SOLUTION INTRAVENOUS at 00:18

## 2018-03-07 RX ADMIN — PIPERACILLIN SODIUM,TAZOBACTAM SODIUM 3.38 G: 3; .375 INJECTION, POWDER, FOR SOLUTION INTRAVENOUS at 06:09

## 2018-03-07 RX ADMIN — PIPERACILLIN SODIUM,TAZOBACTAM SODIUM 3.38 G: 3; .375 INJECTION, POWDER, FOR SOLUTION INTRAVENOUS at 18:53

## 2018-03-07 RX ADMIN — PIPERACILLIN SODIUM,TAZOBACTAM SODIUM 3.38 G: 3; .375 INJECTION, POWDER, FOR SOLUTION INTRAVENOUS at 23:48

## 2018-03-07 RX ADMIN — ONDANSETRON 4 MG: 2 INJECTION INTRAMUSCULAR; INTRAVENOUS at 12:28

## 2018-03-07 RX ADMIN — SODIUM CHLORIDE: 900 INJECTION INTRAVENOUS at 06:08

## 2018-03-07 RX ADMIN — PIPERACILLIN SODIUM,TAZOBACTAM SODIUM 3.38 G: 3; .375 INJECTION, POWDER, FOR SOLUTION INTRAVENOUS at 12:27

## 2018-03-07 ASSESSMENT — ACTIVITIES OF DAILY LIVING (ADL)
DRESS: 0-->INDEPENDENT
RETIRED_COMMUNICATION: 0-->UNDERSTANDS/COMMUNICATES WITHOUT DIFFICULTY
FALL_HISTORY_WITHIN_LAST_SIX_MONTHS: NO
RETIRED_EATING: 0-->INDEPENDENT
BATHING: 0-->INDEPENDENT
TOILETING: 0-->INDEPENDENT
COGNITION: 0 - NO COGNITION ISSUES REPORTED
SWALLOWING: 0-->SWALLOWS FOODS/LIQUIDS WITHOUT DIFFICULTY

## 2018-03-07 NOTE — PROGRESS NOTES
RECEIVING UNIT ED HANDOFF REVIEW    ED Nurse Handoff Report was reviewed by: Ruth Segundo on March 6, 2018 at 7:34 PM

## 2018-03-07 NOTE — PLAN OF CARE
Problem: Patient Care Overview  Goal: Plan of Care/Patient Progress Review  Pt A&Ox4. VSS on RA, LGT 99.4. Up independently. C/o mild HA, with some relief from cold pack, declined medication. NPO ex ice chips/meds. Denied abdominal pain/discomfort. Denied nausea. BS +. Surgery recommending IR to drain abcess, possibly today. Continue to monitor.

## 2018-03-07 NOTE — CONSULTS
General Surgery [facility] Consultation/H&P    Leticia Tompkins MRN#: [id number]   Age: 57 year old YOB: 1960     Date of Admission:          3/6/2018  Reason for consult/H&P: Diverticular perforation   Surgeon:      Waldo Figueroa MD                  Chief Complaint:   Abdominal pain, left lower quadrant         History of Present Illness:   This patient is a 57 year old  female who presented to the [facility] ER with abdominal pain that developed over the last four days. She had chills, but no diarrhea or fevers. She has not had this before. She is fairly comfortable now.. Denies fever, nausea, vomiting, change in BM or urination.   Denies having any previous episodes. She had a hysterectomy and rectal prolapse repair from above. History is obtained from the patient and chart.  She has had regular colonoscopies since age 47. She had diverticulosis seen at that first colonoscopy.         Past Medical History:    has a past medical history of Depressive disorder, not elsewhere classified (1997); Lumbago; Spider veins; and Tachycardia, unspecified (2004).          Past Surgical History:     Past Surgical History:   Procedure Laterality Date     BLADDER SURGERY       C NONSPECIFIC PROCEDURE  1991    Tubal ligation     C NONSPECIFIC PROCEDURE      Lasik eye surgery     C NONSPECIFIC PROCEDURE  3/07     L5-S1 discectomy     CYSTOCELE REPAIR N/A 0/2013     HYSTERECTOMY N/A 05/20013     ORTHOPEDIC SURGERY Left 4-19-16    left foot bunionectomy     ORTHOPEDIC SURGERY      Foot surgery      RECTOCELE REPAIR N/A 05/2013            Medications:     Prior to Admission medications    Medication Sig Start Date End Date Taking? Authorizing Provider   sulfamethoxazole-trimethoprim (BACTRIM DS/SEPTRA DS) 800-160 MG per tablet Take 1 tablet by mouth 2 times daily 3/6/18   Lc Michael MD   metroNIDAZOLE (FLAGYL) 500 MG tablet Take 1 tablet (500 mg) by mouth 3 times daily 3/6/18   Lc Michael,  "MD   IBUPROFEN PO Take 200-400 mg by mouth as needed for moderate pain Reported on 2/22/2017    Reported, Patient            Allergies:   No Known Allergies         Social History:     Social History   Substance Use Topics     Smoking status: Former Smoker     Types: Cigarettes     Quit date: 3/12/1983     Smokeless tobacco: Never Used     Alcohol use 0.0 - 1.5 oz/week     0 - 3 Glasses of wine per week      Comment: socially             Family History:    This patient has a history of colon cancer in a cousin.  Family history is reviewed in detail.          Review of Systems:   Brief ROS is negative other than noted in the HPI.  C: NEGATIVE for fever,, change in weight  R: NEGATIVE for significant cough or SOB  CV: NEGATIVE for chest pain, palpitations or peripheral edema  GI:  NEGATIVE for dysuria, heartburn, or change in bowel habits  H: NEGATIVE for bleeding problems         Physical Exam:   Blood pressure 123/79, pulse 79, temperature 97.8  F (36.6  C), temperature source Oral, resp. rate 18, height 1.664 m (5' 5.5\"), weight 69.9 kg (154 lb), SpO2 97 %.  I/O last 3 completed shifts:  In: 1083 [I.V.:1083]  Out: 325 [Urine:325]    General - This is a well developed, well nourished female .  HEENT - Normocephalic. Atraumatic. Moist mucous membranes. Pupils equal.  No scleral icterus. Nose normal.  Neck - Supple without masses. No cervical adenopathy or thyromegaly  Lungs - Breathing not labored  Chest - Not tender. CVA's nontender  Heart - Regular rate & rhythm   Abdomen - Soft, tender low midline and LLQ, nondistended with +bowel sounds, no organomegaly.  Extremities - Moves all extremities. Warm without edema.  Neurologic - Nonfocal.          Data:   Labs:  Recent Labs   Lab Test  03/07/18   0753  03/06/18   1426  04/05/16   1550   WBC  12.5*  13.1*  6.5   HGB  13.0  13.6  13.9   HCT  39.0  40.9  41.5   PLT  366  324  296     Recent Labs   Lab Test  03/07/18   0753  03/06/18   1731  03/06/18   1431   POTASSIUM  " 3.7  3.6  4.8   CHLORIDE  105  100  96   CO2  24  27   --    BUN  11  12  13  18   CR  0.60  0.60  0.72     Recent Labs   Lab Test  03/06/18   1731  03/06/18   1431   BILITOTAL  0.3  0.4   ALT  20  13   AST  13  13   ALKPHOS  57  58     Recent Labs   Lab Test  03/06/18   1731   INR  1.08     Recent Labs   Lab Test  03/07/18   0753  03/06/18   1731  03/06/18   1431   LINNETTE  8.3*  8.9  9.7     Recent Labs   Lab Test  03/07/18   0753  03/06/18   1731  03/06/18   1431  03/24/17   0912  02/22/17   1315   ANIONGAP  9  7   --    --    --    PROTEIN   --    --    --   Negative  Negative   ALBUMIN   --   3.4  4.2   --    --        CT scan of the abdomen: reviewed. Sigmoid diverticulitis with small fluid collection. This has been aspirated by radiologist today    Ultrasound of the abdomen: not done         Assessment:   Diverticulitis with contained perforation. Small abscess aspirated by radiologist         Plan:    IV antibiotics, liquids. Hope to transition to PO antibiotics and home in the next few days. Discussed situation and options with patient.        I have discussed the history, physical, and plan with the patient.   Waldo Figueroa M.D.

## 2018-03-07 NOTE — PROGRESS NOTES
[facility]  Hospitalist Progress Note   03/07/2018          Assessment and Plan:        Ms. Leticia Tompkins is a very pleasant 57-year-old female with no significant medical history, presented with abdominal pain on 03/07/2018.    Acute diverticulitis with diverticular abscess.  Had been to her PCP Dr. Lc Michael with complaints of pain abdomen for past 3-4 days. Outpatient CT scan, showed a complex diverticulitis with abscess so she was sent to the ER for further evaluation.    She had her colonoscopy at age 43 when she was noted with some diverticulosis, but never had any episodes of diverticulitis before. As a first cousin with history of colon cancer.    normal LFTs, normal lactate.  CBC with elevated WBC of 13.1 and blood cultures are pending.    CT abdomen reviewed shows a distal descending colon and sigmoid colon complex diverticulitis at the anterior left lower quadrant.  There is an abscess at this location measuring 3.2 cm that appears to involve both the colonic wall and extends into the left abdominus rectus muscle.     ED team had discuss with surgery and recommended IR guided drainage. This morning did discuss with IV team/team from CT imaging and recommend ultrasound-guided aspiration.  NPO for procedure. Will start on oral clear liquid diet if able to tolerate.  Will send out aerobic/anaerobic cultures and Gram stain from fluid.  IV Zosyn empirically till cultures reported.  Surgical consultation requested.   NS at hundred ML per hour.  Pain management with the oral Tylenol for my pain, Percocet for moderate pain and IV Dilaudid for severe pain    Prediabetes.hemoglobin A1c 6.2  Closely monitor blood sugars.  Will hold off insulin sliding scale as patient NPO/ on clear liquid diet after procedure    # Pain Assessment:   Current Pain Score 3/7/2018 3/6/2018 3/6/2018   Patient currently in pain? yes yes -   Pain score (0-10) 3 2 3   Pain location Head Head -   Pain descriptors  Dull;Headache Dull -   - Leticia is experiencing pain due to headache. Pain management was discussed and the plan was created in a collaborative fashion.  Leticia's response to the current recommendations: engaged  - Please see the plan for pain management as documented above      Active Diet Order      NPO for Medical/Clinical Reasons Except for: Meds, Ice Chips    DVT Prophylaxis:  pneumatic compression device and ambulate as tolerated  Code Status: Full Code  Disposition: Expected discharge in 2 to 3 days pending clinical improvement    Patient, interdisciplinary team involved in care and agrees with plan.  Total time - Greater than 25 min. More than 50% of time spent in direct patient care, care coordination, and formalizing plan of care.     Lauri Winkler MD        Interval History:      patient lying in bed. Complains of headache 3/10 in intensity. No nausea vomiting.  Abdominal discomfort improving. Continues to be nil by mouth.       Physical Exam:        Physical Exam   Temp:  [98.4  F (36.9  C)-100.1  F (37.8  C)] 99.1  F (37.3  C)  Pulse:  [88] 88  Heart Rate:  [81-87] 81  Resp:  [16] 16  BP: (108-138)/(66-75) 112/66  SpO2:  [92 %-96 %] 95 %    Intake/Output Summary (Last 24 hours) at 03/07/18 0749  Last data filed at 03/07/18 0608   Gross per 24 hour   Intake             1083 ml   Output              325 ml   Net              758 ml     Admission Weight: 69.9 kg (154 lb)  Current Weight: 69.9 kg (154 lb)    PHYSICAL EXAM  GENERAL: Patient is in no distress. Alert and oriented.  HEART: Regular rate and rhythm. S1S2. No murmurs  LUNGS: Clear to auscultation bilaterally. No expiratory wheeze.   ABDOMEN: Soft, tenderness in the left lower quadrant. Active bowel sounds in all 4 quadrants.  NEURO: motor and sensory system grossly intact  EXTREMITIES: No pedal edema. 2+ peripheral pulses.         Medications:          piperacillin-tazobactam  3.375 g Intravenous Q6H     naloxone, melatonin, acetaminophen,  oxyCODONE-acetaminophen, HYDROmorphone, senna-docusate **OR** senna-docusate, magnesium hydroxide, bisacodyl, ondansetron **OR** ondansetron, prochlorperazine **OR** prochlorperazine **OR** prochlorperazine         Data:      All new lab and imaging data was reviewed.   Results for orders placed or performed during the hospital encounter of 03/06/18 (from the past 24 hour(s))   INR   Result Value Ref Range    INR 1.08 0.86 - 1.14   Comprehensive metabolic panel   Result Value Ref Range    Sodium 134 133 - 144 mmol/L    Potassium 3.6 3.4 - 5.3 mmol/L    Chloride 100 94 - 109 mmol/L    Carbon Dioxide 27 20 - 32 mmol/L    Anion Gap 7 3 - 14 mmol/L    Glucose 100 (H) 70 - 99 mg/dL    Urea Nitrogen 12 7 - 30 mg/dL    Creatinine 0.60 0.52 - 1.04 mg/dL    GFR Estimate >90 >60 mL/min/1.7m2    GFR Estimate If Black >90 >60 mL/min/1.7m2    Calcium 8.9 8.5 - 10.1 mg/dL    Bilirubin Total 0.3 0.2 - 1.3 mg/dL    Albumin 3.4 3.4 - 5.0 g/dL    Protein Total 7.6 6.8 - 8.8 g/dL    Alkaline Phosphatase 57 40 - 150 U/L    ALT 20 0 - 50 U/L    AST 13 0 - 45 U/L   Blood culture   Result Value Ref Range    Specimen Description Blood Left Arm     Special Requests Aerobic and anaerobic bottles received     Culture Micro No growth after 4 hours    Lactic acid whole blood   Result Value Ref Range    Lactic Acid 0.8 0.7 - 2.0 mmol/L   Blood culture   Result Value Ref Range    Specimen Description Blood Right Arm     Special Requests Aerobic and anaerobic bottles received     Culture Micro No growth after 4 hours

## 2018-03-07 NOTE — PLAN OF CARE
Problem: Patient Care Overview  Goal: Plan of Care/Patient Progress Review  0700-1930  Pt A&Ox4. VSS on RA.  Up independently. C/o mild HA, with some relief from cold pack, declined medication. Tolerating clear liquids.  Pt had US guided aspiration of colon abscess this am, site with band-aid CDI.  Denied abdominal pain/discomfort, BS +.  Continues on IVF and abx.

## 2018-03-07 NOTE — ED NOTES
"[facility]  ED Nurse Handoff Report    ED Chief complaint: Abdominal Pain (Pt presents from clinic, pt of Lc Michael, and had CT peformed showing complex sigmoid ductis abscess into abdominal rectus muscle)      ED Diagnosis:   Final diagnoses:   None       Code Status: Full Code    Allergies: No Known Allergies    Activity level - Baseline/Home:  Independent    Activity Level - Current:   Independent     Needed?: No    Isolation: No  Infection: Not Applicable    Bariatric?: No    Vital Signs:   Vitals:    03/06/18 1706   BP: 138/74   Resp: 16   Temp: 99.1  F (37.3  C)   TempSrc: Oral   SpO2: 96%   Weight: 69.9 kg (154 lb)   Height: 1.664 m (5' 5.5\")       Cardiac Rhythm: ,        Pain level: 0-10 Pain Scale: 3    Is this patient confused?: No     Patient Report: Initial Complaint: abd pain  Focused Assessment: pt went to clinic today with abd pain, no loose stools or nausea, had a CT as an outpt and found to have a colonic wall abcess. Pt has not needed any pain meds while here. Alert and oriented, steady on feet.  here  Tests Performed: labs  Abnormal Results:   Results for orders placed or performed during the hospital encounter of 03/06/18   INR   Result Value Ref Range    INR 1.08 0.86 - 1.14   Comprehensive metabolic panel   Result Value Ref Range    Sodium 134 133 - 144 mmol/L    Potassium 3.6 3.4 - 5.3 mmol/L    Chloride 100 94 - 109 mmol/L    Carbon Dioxide 27 20 - 32 mmol/L    Anion Gap 7 3 - 14 mmol/L    Glucose 100 (H) 70 - 99 mg/dL    Urea Nitrogen 12 7 - 30 mg/dL    Creatinine 0.60 0.52 - 1.04 mg/dL    GFR Estimate >90 >60 mL/min/1.7m2    GFR Estimate If Black >90 >60 mL/min/1.7m2    Calcium 8.9 8.5 - 10.1 mg/dL    Bilirubin Total 0.3 0.2 - 1.3 mg/dL    Albumin 3.4 3.4 - 5.0 g/dL    Protein Total 7.6 6.8 - 8.8 g/dL    Alkaline Phosphatase 57 40 - 150 U/L    ALT 20 0 - 50 U/L    AST 13 0 - 45 U/L   Lactic acid whole blood   Result Value Ref Range    Lactic Acid 0.8 " 0.7 - 2.0 mmol/L     Treatments provided: fluids, antibx    Family Comments:  here    OBS brochure/video discussed/provided to patient: N/A    ED Medications:   Medications   0.9% sodium chloride BOLUS (0 mLs Intravenous Stopped 3/6/18 1835)   piperacillin-tazobactam (ZOSYN) 3.375 g vial to attach to  mL bag (0 g Intravenous Stopped 3/6/18 1834)       Drips infusing?:  Yes    For the majority of the shift this patient was Green.   Interventions performed were none.    Severe Sepsis OR Septic Shock Diagnosis Present: No      ED NURSE PHONE NUMBER: [phone]

## 2018-03-07 NOTE — PHARMACY-ADMISSION MEDICATION HISTORY
Admission medication history interview status for the 3/6/2018  admission is complete. See EPIC admission navigator for prior to admission medications     Medication history source reliability:Good    Actions taken by pharmacist (provider contacted, etc):  Spoke w/ patient.      Additional medication history information not noted on PTA med list :   - Patient was prescribed antibiotics today; however, they were never filled as patient came to the hospital.     Medication reconciliation/reorder completed by provider prior to medication history? No    Time spent in this activity: 5 minutes    Prior to Admission medications    Medication Sig Last Dose Taking? Auth Provider   sulfamethoxazole-trimethoprim (BACTRIM DS/SEPTRA DS) 800-160 MG per tablet Take 1 tablet by mouth 2 times daily  at Not started  Lc Michael MD   metroNIDAZOLE (FLAGYL) 500 MG tablet Take 1 tablet (500 mg) by mouth 3 times daily  at Not started  Lc Michael MD   IBUPROFEN PO Take 200-400 mg by mouth as needed for moderate pain Reported on 2/22/2017  at prn  Reported, Patient     Lucia Wilhelm, PharmD, BCPS

## 2018-03-07 NOTE — PROCEDURES
RADIOLOGY PROCEDURE NOTE  Patient name: Leticia Tompkins  MRN: [id number]  : 1960    Pre-procedure diagnosis: LLQ abdominal wall abscess  Post-procedure diagnosis: Same    Procedure Date/Time: 2018  9:36 AM  Procedure: US guided aspiration  Estimated blood loss: None  Specimen(s) collected with description: 1mL pus  The patient tolerated the procedure well with no immediate complications.  Significant findings:very small, poorly organized fluid collection.  Not big enough for drain placement.  Very little fluid present    See imaging dictation for procedural details.    Provider name: Ladarius Damico  Assistant(s):None

## 2018-03-07 NOTE — H&P
Admitted:     03/06/2018      PRIMARY CARE PHYSICIAN:   Dr. Toni Gan.      CHIEF COMPLAINT:  Pain abdomen.      HISTORY OF PRESENT ILLNESS:  Ms. Leticia Tompkins is a very pleasant 57-year-old female with not much of a past medical history, who presented to the ER today with complaints of pain abdomen.  She actually went to see Dr. Lc Michael today with complaints of pain abdomen for past 3-4 days.  Her symptoms began more like a body ache, fatigue and with concern for flu, and then she also started having pain abdomen in the left lower quadrant with no specific aggravating or relieving factor.  She denies any fever, chills or rigors.  No nausea or vomiting.  Reports normal bowel and bladder habit.  No diarrhea.  Her PCP had referred her for a CT scan, which showed a complex diverticulitis with abscess so she was sent to the ER for further evaluation.  She had her colonoscopy at age 43 when she was noted with some diverticulosis, but never had any episodes of diverticulitis before.  In the ER she was seen by Dr. Pisano, who talked to Dr. Valles from General Surgery who suggested IR evaluation.  Dr. Pisano talked to Dr. Damico from IR who did not think there was much to drain based on the size, but will evaluate the patient formally tomorrow.  She was given a dose of Zosyn and hospitalist was requested admission for further evaluation.      REVIEW OF SYSTEMS:  A 10-point review of systems was done and were negative apart from those mentioned in the history of present illness.      PAST MEDICAL HISTORY:   1. Diverticulosis.    2. Status post hysterectomy.      MEDICATIONS PRIOR TO ADMISSION:  None.  She was just prescribed Bactrim and Flagyl today which she has not started yet.      ALLERGIES:  No known drug allergies.      SOCIAL HISTORY:  She denies smoking, takes occasional alcohol, no illicit drug use.      FAMILY HISTORY:  Significant for colon cancer.  Family history otherwise not pertinent to current  presentation.      PHYSICAL EXAMINATION:   GENERAL:  The patient is conscious, alert, oriented x 3, lying comfortably in bed in no apparent distress.   VITAL SIGNS:  Temperature 99.1, heart rate of 88, blood pressure 138/74, saturation 96% on room air.   HEENT:  Pupils are equal and reactive to light and accommodation.  Extraocular movements are intact.  Oral mucosa is slightly dry.   NECK:  Supple, no JVD.   RESPIRATORY:  Lung sounds bilaterally clear to auscultation, no wheezes or crepitation.   CARDIOVASCULAR:  Normal S1-S2, regular rate and rhythm, no murmur.   ABDOMEN:  Soft.  She does have localized tenderness in the left lower quadrant with a firm mass approximately 3-4 cm.  No guarding, rigidity or rebound tenderness.  Bowel sounds are present.   LOWER EXTREMITIES:  With no edema.   NEUROLOGIC:  No focal neurological deficits noted.  Cranial nerves II-XII grossly intact.   PSYCHIATRIC:  Normal mood and affect.      LABORATORY DATA/IMAGINGS:  CMP mostly unremarkable including normal LFTs, normal lactate.  CBC with elevated WBC of 13.1 and blood cultures are pending.  CT abdomen reviewed shows a distal descending colon and sigmoid colon complex diverticulitis at the anterior left lower quadrant.  There is an abscess at this location measuring 3.2 cm that appears to involve both the colonic wall and extends into the left abdominus rectus muscle.      ASSESSMENT AND PLAN:  Ms. Leticia Tompkins is a 57-year-old female with a history of diverticulosis who presents to the ER today with complaints of pain abdomen and noted with diverticular abscess.     1.  Acute diverticulitis with diverticular abscess.  Will admit her as inpatient and will continue with Zosyn and keep her n.p.o., start intravenous hydration, p.r.n. pain medication.  We will consult Intervention Radiology for probable IR-guided drainage.  IR to evaluate the patient in a.m.  Will follow blood cultures.  She clinically does not look septic.  Repeat CBC,  BMP in a.m.    2.  DVT prophylaxis, mechanical with PCD boots.      CODE STATUS:  Full code.         ZACK WILLIAM MD             D: 2018   T: 2018   MT: MALCOLM      Name:     JULES ROSS   MRN:      [id number]        Account:      [id number]   :      1960        Admitted:     2018                   Document: [id number]

## 2018-03-07 NOTE — PROGRESS NOTES
2/21/18 Also faxed preop to [facility] @ [phone].    Corbin Green,   [facility]  [phone]

## 2018-03-08 LAB
ALBUMIN SERPL-MCNC: 2.8 G/DL (ref 3.4–5)
ALP SERPL-CCNC: 71 U/L (ref 40–150)
ALT SERPL W P-5'-P-CCNC: 23 U/L (ref 0–50)
ANION GAP SERPL CALCULATED.3IONS-SCNC: 9 MMOL/L (ref 3–14)
AST SERPL W P-5'-P-CCNC: 19 U/L (ref 0–45)
BILIRUB SERPL-MCNC: 0.3 MG/DL (ref 0.2–1.3)
BUN SERPL-MCNC: 10 MG/DL (ref 7–30)
CALCIUM SERPL-MCNC: 8.3 MG/DL (ref 8.5–10.1)
CHLORIDE SERPL-SCNC: 106 MMOL/L (ref 94–109)
CO2 SERPL-SCNC: 24 MMOL/L (ref 20–32)
COPATH REPORT: NORMAL
CREAT SERPL-MCNC: 0.57 MG/DL (ref 0.52–1.04)
ERYTHROCYTE [DISTWIDTH] IN BLOOD BY AUTOMATED COUNT: 13 % (ref 10–15)
GFR SERPL CREATININE-BSD FRML MDRD: >90 ML/MIN/1.7M2
GLUCOSE SERPL-MCNC: 120 MG/DL (ref 70–99)
HCT VFR BLD AUTO: 36.3 % (ref 35–47)
HGB BLD-MCNC: 12.2 G/DL (ref 11.7–15.7)
MCH RBC QN AUTO: 31.6 PG (ref 26.5–33)
MCHC RBC AUTO-ENTMCNC: 33.6 G/DL (ref 31.5–36.5)
MCV RBC AUTO: 94 FL (ref 78–100)
PLATELET # BLD AUTO: 349 10E9/L (ref 150–450)
POTASSIUM SERPL-SCNC: 4 MMOL/L (ref 3.4–5.3)
PROCALCITONIN SERPL-MCNC: <0.05 NG/ML
PROT SERPL-MCNC: 6.6 G/DL (ref 6.8–8.8)
RBC # BLD AUTO: 3.86 10E12/L (ref 3.8–5.2)
SODIUM SERPL-SCNC: 139 MMOL/L (ref 133–144)
WBC # BLD AUTO: 7.1 10E9/L (ref 4–11)

## 2018-03-08 PROCEDURE — 25000132 ZZH RX MED GY IP 250 OP 250 PS 637: Performed by: HOSPITALIST

## 2018-03-08 PROCEDURE — 85027 COMPLETE CBC AUTOMATED: CPT | Performed by: HOSPITALIST

## 2018-03-08 PROCEDURE — 36415 COLL VENOUS BLD VENIPUNCTURE: CPT | Performed by: HOSPITALIST

## 2018-03-08 PROCEDURE — 12000000 ZZH R&B MED SURG/OB

## 2018-03-08 PROCEDURE — 25000128 H RX IP 250 OP 636: Performed by: HOSPITALIST

## 2018-03-08 PROCEDURE — 99231 SBSQ HOSP IP/OBS SF/LOW 25: CPT | Performed by: SURGERY

## 2018-03-08 PROCEDURE — 84145 PROCALCITONIN (PCT): CPT | Performed by: HOSPITALIST

## 2018-03-08 PROCEDURE — 99232 SBSQ HOSP IP/OBS MODERATE 35: CPT | Performed by: HOSPITALIST

## 2018-03-08 PROCEDURE — 80053 COMPREHEN METABOLIC PANEL: CPT | Performed by: HOSPITALIST

## 2018-03-08 RX ADMIN — SODIUM CHLORIDE: 900 INJECTION INTRAVENOUS at 06:11

## 2018-03-08 RX ADMIN — PIPERACILLIN SODIUM,TAZOBACTAM SODIUM 3.38 G: 3; .375 INJECTION, POWDER, FOR SOLUTION INTRAVENOUS at 18:19

## 2018-03-08 RX ADMIN — PIPERACILLIN SODIUM,TAZOBACTAM SODIUM 3.38 G: 3; .375 INJECTION, POWDER, FOR SOLUTION INTRAVENOUS at 12:43

## 2018-03-08 RX ADMIN — SODIUM CHLORIDE: 900 INJECTION INTRAVENOUS at 04:41

## 2018-03-08 RX ADMIN — PIPERACILLIN SODIUM,TAZOBACTAM SODIUM 3.38 G: 3; .375 INJECTION, POWDER, FOR SOLUTION INTRAVENOUS at 06:12

## 2018-03-08 RX ADMIN — ONDANSETRON 4 MG: 2 INJECTION INTRAMUSCULAR; INTRAVENOUS at 04:49

## 2018-03-08 RX ADMIN — ACETAMINOPHEN 650 MG: 325 TABLET, FILM COATED ORAL at 04:49

## 2018-03-08 NOTE — PLAN OF CARE
Problem: Patient Care Overview  Goal: Plan of Care/Patient Progress Review  Outcome: Improving  Pt A&Ox4. VSS on RA. Bowel sounds positive. Denies abdominal pain/discomfort. Passing flatus. C/o nausea; PRN Zofran helpful.On clear liquid diet. C/o  Headache; Tylenol helpful. Up independently. D/c pending

## 2018-03-08 NOTE — PROGRESS NOTES
Afebrile, pulse OK. Pain gone. No fevers or chills. Had normal BM. No nausea  Exam: abd soft, not tender, no guarding.  Labs: WBC 7.1, lytes OK  Imp: resolving diverticular attack  Plan: home soon (today? ) on either Augmentin or cipro/flagyl for 7 more days. Low fiber x3 weeks, then high fiber diet.  Discussed with patient and .

## 2018-03-08 NOTE — PROGRESS NOTES
[facility]  Hospitalist Progress Note   03/08/2018          Assessment and Plan:       Ms. Leticia Tompkins is a 57-year-old female with no significant medical history, presented with abdominal pain on 03/07/2018.     Acute diverticulitis with diverticular abscess.  Had been to her PCP Dr. Lc Michael with complaints of pain abdomen for 3-4 days. Outpatient CT scan, showed a complex diverticulitis with abscess so she was sent to the ER for further evaluation.   She had her colonoscopy at age 43 when she was noted with some diverticulosis, but never had any episodes of diverticulitis before. Has a first cousin with history of colon cancer.     At the time of admission normal LFTs, normal lactate.  CBC with elevated WBC of 13.1.  CT abdomen reviewed shows a distal descending colon and sigmoid colon complex diverticulitis at the anterior left lower quadrant.  There is an abscess at this location measuring 3.2 cm that appears to involve both the colonic wall and extends into the left abdominus rectus muscle.   Underwent drainage under ultrasound, one ML pus drained. Tolerated procedure well.  Gram stain showing gram-negative rods.   Blood cultures no growth.   Continue IV Zosyn empirically until cultures reported.  Advanced diet to regular diet.  Continue IV fluids  Pain management with oral Tylenol for mild pain, Percocet for moderate pain, IV Dilaudid  for severe pain.  If pain improves, tolerating oral diet, cultures reported will plan for discharge tomorrow on oral antibiotics.  Evaluated by Gen. surgery and appreciate recommendations    Prediabetes.hemoglobin A1c 6.2  Closely monitor blood sugars.  Will hold off insulin sliding scale at this time blood sugars around hundred to 120    # Pain Assessment:   Current Pain Score 3/8/2018 3/7/2018 3/7/2018   Patient currently in pain? yes yes no   Pain score (0-10) 4 3 -   Pain location Head Head -   Pain descriptors Headache Headache -   - Leticia is  experiencing pain due to diverticular abscess. Pain management was discussed with Leticia and her significant other and the plan was created in a collaborative fashion.  Leticia's response to the current recommendations: engaged  - Please see the plan for pain management as documented above    DVT Prophylaxis:  pneumatic compression device and ambulate as tolerated  Code Status: Full Code  Disposition: Expected discharge tomorrow if uneventful stay overnight    Patient, family, interdisciplinary team involved in care and agrees with plan.  Total time - Greater than 25 min. More than 50% of time spent in direct patient care, care coordination and formalizing plan of care.     Lauri Winkler MD        Interval History:      patient admits to mild improvement in abdominal pain. Denies any chest pain or shortness of breath. Has not had a bowel movement. Has nausea no vomiting.  Tolerating clear liquid diet.       Physical Exam:        Physical Exam   Temp:  [97.6  F (36.4  C)-98.6  F (37  C)] 97.8  F (36.6  C)  Pulse:  [68-79] 79  Heart Rate:  [65-68] 66  Resp:  [16-18] 18  BP: (111-130)/(63-81) 111/63  SpO2:  [94 %-97 %] 97 %    Intake/Output Summary (Last 24 hours) at 03/08/18 0829  Last data filed at 03/08/18 0611   Gross per 24 hour   Intake             2500 ml   Output             1000 ml   Net             1500 ml       Admission Weight: 69.9 kg (154 lb)  Current Weight: 69.9 kg (154 lb)    PHYSICAL EXAM  GENERAL: Patient is in no distress. Alert and oriented.  HEENT: Oropharynx pink, moist. Pupils equal  HEART: Regular rate and rhythm. S1S2. No murmurs, gallops or rubs noted.  LUNGS: Clear to auscultation bilaterally. No expiratory wheeze. No accessory muscles of respiration noted. Respirations unlabored  ABDOMEN: Soft, no abdominal tenderness with palpation. Active bowel sounds in all 4 quadrants.  NEURO: Cranial nerves II-XII intact. Motor and sensory system in normal range  EXTREMITIES: No cyanosis, clubbing or  edema. 2+ peripheral pulses.  SKIN: Warm, dry. No rash or bruising.  PSYCHIATRY Cooperative       Medications:          piperacillin-tazobactam  3.375 g Intravenous Q6H     naloxone, melatonin, acetaminophen, oxyCODONE-acetaminophen, HYDROmorphone, senna-docusate **OR** senna-docusate, magnesium hydroxide, bisacodyl, ondansetron **OR** ondansetron, prochlorperazine **OR** prochlorperazine **OR** prochlorperazine         Data:      All new lab and imaging data was reviewed.   EKG:    Results for orders placed or performed during the hospital encounter of 03/06/18 (from the past 24 hour(s))   Wound Culture Aerobic Bacterial   Result Value Ref Range    Specimen Description Abdominal Abscess     Culture Micro       Canceled, Test credited  Test reordered as correct code  ABC     Gram stain   Result Value Ref Range    Specimen Description Abdominal Abscess     Gram Stain Moderate  Gram negative rods   (A)     Gram Stain Few  Gram positive rods   (A)     Gram Stain Moderate  WBC'S seen  predominantly PMN's      Abscess Culture Aerobic Bacterial   Result Value Ref Range    Specimen Description Abdominal Abscess     Culture Micro Moderate growth  Gram negative rods   (A)     Culture Micro Culture in progress    US Drainage Seroma/Hematoma Abscess/Cyst    Narrative    ULTRASOUND DRAIN OF SEROMA/HEMATOMA/ABSCESS/CYST  3/7/2018 9:46 AM     HISTORY: Left lower quadrant pain abdomen with CT showing  diverticulitis with abscess.     COMPARISON: CT 3/6/2018    MEDICATIONS: 2 mL 1% lidocaine.    TECHNIQUE: Informed consent was obtained from the patient. A timeout  was performed. The skin was prepped and draped in a sterile manner. 1%  lidocaine was used for local anesthesia. Under ultrasound guidance, a  5 Faroese Yueh needle was advanced into a hypoechoic collection in the  left lower quadrant abdominal wall. Approximately 1 mL of pus was  aspirated. The patient tolerated the procedure well with no  immediate  complications.    FINDINGS: Limited ultrasound images demonstrate needle access to a  small poorly formed left lower quadrant hypoechoic collection.      Impression    IMPRESSION: Ultrasound-guided aspiration of an abdominal wall  abscess/phlegmon.    FERNANDO BERKOWITZ MD   CBC with platelets   Result Value Ref Range    WBC 7.1 4.0 - 11.0 10e9/L    RBC Count 3.86 3.8 - 5.2 10e12/L    Hemoglobin 12.2 11.7 - 15.7 g/dL    Hematocrit 36.3 35.0 - 47.0 %    MCV 94 78 - 100 fl    MCH 31.6 26.5 - 33.0 pg    MCHC 33.6 31.5 - 36.5 g/dL    RDW 13.0 10.0 - 15.0 %    Platelet Count 349 150 - 450 10e9/L   Comprehensive metabolic panel   Result Value Ref Range    Sodium 139 133 - 144 mmol/L    Potassium 4.0 3.4 - 5.3 mmol/L    Chloride 106 94 - 109 mmol/L    Carbon Dioxide 24 20 - 32 mmol/L    Anion Gap 9 3 - 14 mmol/L    Glucose 120 (H) 70 - 99 mg/dL    Urea Nitrogen 10 7 - 30 mg/dL    Creatinine 0.57 0.52 - 1.04 mg/dL    GFR Estimate >90 >60 mL/min/1.7m2    GFR Estimate If Black >90 >60 mL/min/1.7m2    Calcium 8.3 (L) 8.5 - 10.1 mg/dL    Bilirubin Total 0.3 0.2 - 1.3 mg/dL    Albumin 2.8 (L) 3.4 - 5.0 g/dL    Protein Total 6.6 (L) 6.8 - 8.8 g/dL    Alkaline Phosphatase 71 40 - 150 U/L    ALT 23 0 - 50 U/L    AST 19 0 - 45 U/L

## 2018-03-08 NOTE — PLAN OF CARE
Problem: Patient Care Overview  Goal: Plan of Care/Patient Progress Review  Outcome: Improving  Pt A&Ox4. VSS on RA.  Up independently. C/o mild HA, with some relief from cold pack, declined medication. Tolerating low residue diet.   Denied abdominal pain/discomfort, BS +, one normal BM this am.  Continues on IV abx. continue to monitor.

## 2018-03-09 VITALS
HEART RATE: 79 BPM | DIASTOLIC BLOOD PRESSURE: 83 MMHG | TEMPERATURE: 98 F | SYSTOLIC BLOOD PRESSURE: 140 MMHG | WEIGHT: 154 LBS | RESPIRATION RATE: 18 BRPM | BODY MASS INDEX: 24.75 KG/M2 | HEIGHT: 66 IN | OXYGEN SATURATION: 94 %

## 2018-03-09 PROCEDURE — 25000128 H RX IP 250 OP 636: Performed by: HOSPITALIST

## 2018-03-09 PROCEDURE — 99231 SBSQ HOSP IP/OBS SF/LOW 25: CPT | Performed by: SURGERY

## 2018-03-09 PROCEDURE — 99239 HOSP IP/OBS DSCHRG MGMT >30: CPT | Performed by: HOSPITALIST

## 2018-03-09 RX ORDER — METRONIDAZOLE 500 MG/1
500 TABLET ORAL 3 TIMES DAILY
Qty: 30 TABLET | Refills: 0 | Status: SHIPPED | OUTPATIENT
Start: 2018-03-09 | End: 2018-03-19

## 2018-03-09 RX ORDER — CIPROFLOXACIN 500 MG/1
500 TABLET, FILM COATED ORAL 2 TIMES DAILY
Qty: 20 TABLET | Refills: 0 | Status: SHIPPED | OUTPATIENT
Start: 2018-03-09 | End: 2018-03-19

## 2018-03-09 RX ADMIN — PIPERACILLIN SODIUM,TAZOBACTAM SODIUM 3.38 G: 3; .375 INJECTION, POWDER, FOR SOLUTION INTRAVENOUS at 00:20

## 2018-03-09 RX ADMIN — PIPERACILLIN SODIUM,TAZOBACTAM SODIUM 3.38 G: 3; .375 INJECTION, POWDER, FOR SOLUTION INTRAVENOUS at 05:51

## 2018-03-09 NOTE — PLAN OF CARE
Problem: Patient Care Overview  Goal: Plan of Care/Patient Progress Review  Outcome: Improving  A&Ox4. VSS on RA. Denies pain/nausea. Voiding adequately. Tolerating regular diet. L ankle incision (healing) and bruise from surgery 2 weeks prior. Up independently. Active BS, passing gas. BM 3/8. IV Zoysn Q6H, cultures pending. D/C home today 3/9.

## 2018-03-09 NOTE — DISCHARGE SUMMARY
Discharge Summary  Hospitalist    Date of Admission:  3/6/2018  Date of Discharge:  3/9/2018  Discharging Provider: Lauri Winkler MD    Primary Care Physician   Toni Gan  Primary Care Provider Phone Number: [phone]  Primary Care Provider Fax Number: [phone]    PRINCIPAL DIAGNOSIS  acute diverticulitis.  Diverticular abscess  Prediabetes.    Past Medical History:   Diagnosis Date     Depressive disorder, not elsewhere classified 1997    treated with Zoloft for 8 months     Lumbago     L5-S1 radiculopathy     Spider veins      Tachycardia, unspecified 2004    Single episode treated with Adenosine       History of Present Illness   Leticia Tompkins is an 57 year old female who presented with abdominal pain    Hospital Course   Ms. Leticia Tompkins is a 57-year-old female with no significant medical history, presented with abdominal pain on 03/07/2018.      Acute diverticulitis with diverticular abscess.  Had been to her PCP Dr. Lc Michael with complaints of pain abdomen for 3-4 days. Outpatient CT scan, showed a complex diverticulitis with abscess so she was sent to the ER for further evaluation.   She had her colonoscopy at age 43 when she was noted with some diverticulosis, but never had any episodes of diverticulitis before. Has a first cousin with history of colon cancer.      At the time of admission normal LFTs, normal lactate.  CBC with elevated WBC of 13.1.  CT abdomen reviewed shows a distal descending colon and sigmoid colon complex diverticulitis at the anterior left lower quadrant. There is an abscess at this location measuring 3.2 cm that appears to involve both the colonic wall and extends into the left abdominus rectus muscle.   Underwent drainage under ultrasound, one ML pus drained. Tolerated procedure well.  Gram stain showing gram-negative rods. Pathology report negative for malignancy  Blood cultures no growth.   Treated with IV Zosyn during hospitalization and de-escalated to oral  ciprofloxacin and Flagyl on discharge for 10 days  treated with supportive care including IV fluids/pain management during hospitalization. Has been tolerating oral diet. Recommended the low fiber diet for three weeks and can advanced to regular diet.  Evaluated by Gen. surgery during hospitalization and recommend to follow culture results/oral antibiotics.     Prediabetes.hemoglobin A1c 6.2  emphasized on the need for aggressive lifestyle modification with diet and exercise.  Monitor blood sugars periodically    # Discharge Pain Plan:   - Patient currently has NO PAIN and is not being prescribed pain medications on discharge.    Lauri Winkler MD    Pending Results   These results will be followed up by hospitalist team  Unresulted Labs Ordered in the Past 30 Days of this Admission     Date and Time Order Name Status Description    3/7/2018 0935 Abscess Culture Aerobic Bacterial Preliminary     3/6/2018 1727 Blood culture Preliminary     3/6/2018 1727 Blood culture Preliminary              Physical Exam   Vitals:    03/06/18 1706   Weight: 69.9 kg (154 lb)     Vital Signs with Ranges  Temp:  [97.6  F (36.4  C)-98.3  F (36.8  C)] 98  F (36.7  C)  Heart Rate:  [56-81] 81  Resp:  [16-18] 18  BP: (122-146)/(75-83) 140/83  SpO2:  [94 %-97 %] 94 %  I/O last 3 completed shifts:  In: 500 [P.O.:300; I.V.:200]  Out: -   PHYSICAL EXAM  GENERAL: Patient is in no distress. Alert and oriented.  HEART: Regular rate and rhythm. S1S2. No murmurs, gallops or rubs noted.  LUNGS: Clear to auscultation bilaterally. No expiratory wheeze.   ABDOMEN: Soft, no abdominal tenderness with palpation. Active bowel sounds in all 4 quadrants.  EXTREMITIES: No cyanosis, clubbing or edema. 2+ peripheral pulses.    )Consultations This Hospital Stay   SURGERY GENERAL IP CONSULT    Time Spent on this Encounter   ILauri, personally saw the patient today and spent greater than 30 minutes discharging this patient.  More than 50% of time  spent in direct patient care, care coordination, patient/caregiver counseling, and formalizing plan of care.    Discharge Orders     Reason for your hospital stay   You were admitted with diverticular abscess     Follow-up and recommended labs and tests    Follow up with primary care provider, Toni Gan, within 7 days for hospital follow- up.  No follow up labs or test are needed.     Activity   Your activity upon discharge: activity as tolerated     When to contact your care team   Call your primary doctor if you have any of the following: increased pain.     Discharge Instructions   Prediabetes.hemoglobin A1c 6.2- monitor blood sugars periodically.  Consider lifestyle modification with diet and exercise     Full Code     Diet   Low fiber x3 weeks, then high fiber diet.  Consider low carb diet         Discharge Medications   Current Discharge Medication List      START taking these medications    Details   ciprofloxacin (CIPRO) 500 MG tablet Take 1 tablet (500 mg) by mouth 2 times daily for 10 days  Qty: 20 tablet, Refills: 0    Associated Diagnoses: Diverticulitis of large intestine with abscess without bleeding         CONTINUE these medications which have CHANGED    Details   metroNIDAZOLE (FLAGYL) 500 MG tablet Take 1 tablet (500 mg) by mouth 3 times daily for 10 days  Qty: 30 tablet, Refills: 0    Associated Diagnoses: Diverticulitis of large intestine with abscess without bleeding         CONTINUE these medications which have NOT CHANGED    Details   IBUPROFEN PO Take 200-400 mg by mouth as needed for moderate pain Reported on 2/22/2017         STOP taking these medications       sulfamethoxazole-trimethoprim (BACTRIM DS/SEPTRA DS) 800-160 MG per tablet Comments:   Reason for Stopping:             Allergies   No Known Allergies    Discharge Disposition   Discharged to home  Condition at discharge: Stable    DATA  All new lab and imaging data was reviewed.   Most Recent 3 CBC's:  Recent Labs   Lab Test   03/08/18   0754  03/07/18   0753  03/06/18   1426   WBC  7.1  12.5*  13.1*   HGB  12.2  13.0  13.6   MCV  94  94  94.5   PLT  349  366  324      Most Recent 3 BMP's:  Recent Labs   Lab Test  03/08/18   0754  03/07/18   0753  03/06/18   1731   NA  139  138  134   POTASSIUM  4.0  3.7  3.6   CHLORIDE  106  105  100   CO2  24  24  27   BUN  10  11  12   CR  0.57  0.60  0.60   ANIONGAP  9  9  7   LINNETTE  8.3*  8.3*  8.9   GLC  120*  153*  100*     Most Recent 2 LFT's:  Recent Labs   Lab Test  03/08/18   0754  03/06/18   1731   AST  19  13   ALT  23  20   ALKPHOS  71  57   BILITOTAL  0.3  0.3     Most Recent INR's and Anticoagulation Dosing History:  Anticoagulation Dose History     Recent Dosing and Labs Latest Ref Rng & Units 3/6/2018    INR 0.86 - 1.14 1.08        ost Recent Cholesterol Panel:  Recent Labs   Lab Test  05/29/15   0903   CHOL  233*   LDL  148*   HDL  74   TRIG  57     Most Recent 6 Bacteria Isolates From Any Culture (See EPIC Reports for Culture Details):  Recent Labs   Lab Test  03/07/18   0935  03/06/18   1749  03/06/18   1731  09/28/10   1157   CULT  Moderate growth  Lactose fermenting gram negative rods  *  Light growth  Streptococcus anginosus group  Susceptibility testing not routinely done  *  Culture in progress  Canceled, Test credited  Test reordered as correct code  ABC    No growth after 3 days  No growth after 3 days  No Beta Streptococcus isolated     Most Recent TSH, T4 and A1c Labs:  Recent Labs   Lab Test  02/20/18   1327   A1C  6.2*     Results for orders placed or performed during the hospital encounter of 03/06/18   US Drainage Seroma/Hematoma Abscess/Cyst    Narrative    ULTRASOUND DRAIN OF SEROMA/HEMATOMA/ABSCESS/CYST  3/7/2018 9:46 AM     HISTORY: Left lower quadrant pain abdomen with CT showing  diverticulitis with abscess.     COMPARISON: CT 3/6/2018    MEDICATIONS: 2 mL 1% lidocaine.    TECHNIQUE: Informed consent was obtained from the patient. A timeout  was performed. The  skin was prepped and draped in a sterile manner. 1%  lidocaine was used for local anesthesia. Under ultrasound guidance, a  5 Cypriot Yueh needle was advanced into a hypoechoic collection in the  left lower quadrant abdominal wall. Approximately 1 mL of pus was  aspirated. The patient tolerated the procedure well with no immediate  complications.    FINDINGS: Limited ultrasound images demonstrate needle access to a  small poorly formed left lower quadrant hypoechoic collection.      Impression    IMPRESSION: Ultrasound-guided aspiration of an abdominal wall  abscess/phlegmon.    FERNANDO BERKOWITZ MD

## 2018-03-09 NOTE — PLAN OF CARE
Problem: Patient Care Overview  Goal: Plan of Care/Patient Progress Review  Outcome: Adequate for Discharge Date Met: 03/09/18  A&O X 4.  Up independently.  Tolerating low fiber diet. Denies pain and is passing gas.  Discharged to home.

## 2018-03-10 LAB
BACTERIA SPEC CULT: ABNORMAL
BACTERIA SPEC CULT: ABNORMAL
SPECIMEN SOURCE: ABNORMAL

## 2018-03-12 ENCOUNTER — CARE COORDINATION (OUTPATIENT)
Dept: CARE COORDINATION | Facility: CLINIC | Age: 58
End: 2018-03-12

## 2018-03-12 LAB
BACTERIA SPEC CULT: NO GROWTH
BACTERIA SPEC CULT: NO GROWTH
Lab: NORMAL
Lab: NORMAL
SPECIMEN SOURCE: NORMAL
SPECIMEN SOURCE: NORMAL

## 2018-03-12 NOTE — PROGRESS NOTES
Clinic Care Coordination Contact  [facility]/Voicemail    Referral Source: IP/TCU Report  Clinical Data: Care Coordinator Outreach  Outreach attempted x 1.  Left message on voicemail with call back information and requested return call.  Plan:  Care Coordinator will try to reach patient again in 3-5 business days.    Tesha Balbuena \[facility] [place] [facility]\""  Clinic Care Coordinator  [phone]  [email or web address]

## 2018-03-13 NOTE — PROGRESS NOTES
Clinic Care Coordination Contact    OUTREACH    Referral Information:  Referral Source: IP/TCU Report  Primary Diagnosis: GI Disorders    Chief Complaint   Patient presents with     Clinic Care Coordination - Post Hospital     Diverticulitis of Colon     Universal Utilization:   Utilization    Last refreshed: 3/12/2018 11:23 AM:  No Show Count (past year) 0       Last refreshed: 3/12/2018 11:23 AM:  ED visits 0       Last refreshed: 3/12/2018 11:23 AM:  Hospital admissions 1          Current as of: 3/12/2018 11:23 AM           Clinical Concerns:  Current Medical Concerns:  Pt was hospitalized at [facility] from 3/6-3/9 for diverticulitis and was discharged to home with instructions to follow-up with PCP within 7 days- appointment scheduled for 2/21.Outreach today and pt reports she is doing well. Pt's pain continues to be resolved. Denies any discharge concerns at this time.    Current Behavioral Concerns: None noted    Education Provided to patient: Care Coordination   Clinical Pathway Name: None  Health Maintenance Reviewed: Due/Overdue     Medication Management:  Pt reports adherence     Functional Status:  Mobility Status: Independent  Equipment Currently Used at Home: none  Transportation: Pt drives     Psychosocial:  Current living arrangement:: I live in a private home with spouse  Financial/Insurance: [employer]     Resources and Interventions:  Current Resources: No formal supports  Advanced Care Plans/Directives on file:: No  Patient/Caregiver understanding: Pt reports understanding and denies any additional questions or concerns at this times. [facility] CC engaged in AIDET communication during encounter.     Future Appointments              In 1 week Toni Gan MD [facility]           Plan: [facility] CC will be available if needed.    SALOME De Anda  Clinic Care Coordinator  [phone]  [email or web address]

## 2018-03-21 ENCOUNTER — OFFICE VISIT (OUTPATIENT)
Dept: FAMILY MEDICINE | Facility: CLINIC | Age: 58
End: 2018-03-21

## 2018-03-21 VITALS
RESPIRATION RATE: 16 BRPM | SYSTOLIC BLOOD PRESSURE: 130 MMHG | HEART RATE: 67 BPM | WEIGHT: 155.4 LBS | OXYGEN SATURATION: 98 % | BODY MASS INDEX: 25.47 KG/M2 | DIASTOLIC BLOOD PRESSURE: 74 MMHG

## 2018-03-21 DIAGNOSIS — R73.02 IGT (IMPAIRED GLUCOSE TOLERANCE): ICD-10-CM

## 2018-03-21 DIAGNOSIS — K57.32 DIVERTICULITIS OF COLON: Primary | ICD-10-CM

## 2018-03-21 PROBLEM — K57.20 COLONIC DIVERTICULAR ABSCESS: Status: RESOLVED | Noted: 2018-03-06 | Resolved: 2018-03-21

## 2018-03-21 PROCEDURE — 99214 OFFICE O/P EST MOD 30 MIN: CPT | Performed by: FAMILY MEDICINE

## 2018-03-21 NOTE — PROGRESS NOTES
SUBJECTIVE:                                                      Patient presents for Hospital Followup Visit:    Hospital:  [facility]      Date of Admission: 3/6/18  Date of Discharge: 3/9/18  Reason(s) for Admission: Diverticulitis            Problems taking medications regularly:  Not remembering       Medication changes since discharge: None       Problems adhering to non-medication therapy:  None    Summary of hospitalization:  [facility] discharge summary reviewed. Diverticulitis, requiring I & D of abscess. She is feeling well now. She is feeling well  Diagnostic Tests/Treatments reviewed.  Follow up needed: none  Other Healthcare Providers Involved in Patient s Care:         None  Update since discharge: improved.     ROS:  CONSTITUTIONAL:NEGATIVE for fever, chills, change in weight  GI: NEGATIVE for nausea, abdominal pain, heartburn, or change in bowel habits  Endo: increased A1c to 6.2%, stable for 2 years, but she fels she eats a good diet, although fairly high in fruit    OBJECTIVE:                                                    /74  Pulse 67  Resp 16  Wt 70.5 kg (155 lb 6.4 oz)  SpO2 98%  BMI 25.47 kg/m2   GENERAL APPEARANCE: healthy, alert and no distress  ABDOMEN: soft, nontender, without hepatosplenomegaly or masses and bowel sounds normal    ASSESSMENT/PLAN:                                                      (K57.32) Diverticulitis of colon  (primary encounter diagnosis)  Comment: resolved  Plan: discussed anti inflammatory diet    (R73.02) IGT (impaired glucose tolerance)  Comment:   Plan: Hemoglobin A1C ([facility])        Future order in 3 months. Low carb until.        Post Discharge Medication Reconciliation: discharge medications reconciled and changed, per note/orders (see AVS).  Issues to address: No issues identified.  Plan of care communicated with patient      Type of Medical Decision Making Face-to-Face Visit within 7 Days Face-to-Face Visit within  14 days   Moderate Complexity [id number]   High Complexity [id number]

## 2018-03-21 NOTE — MR AVS SNAPSHOT
After Visit Summary   3/21/2018    Leticia Tompkins    MRN: [id number]           Patient Information     Date Of Birth          1960        Visit Information        Provider Department      3/21/2018 2:15 PM Toni Gan MD [facility]        Today's Diagnoses     Diverticulitis of colon    -  1    IGT (impaired glucose tolerance)           Follow-ups after your visit        Future tests that were ordered for you today     Open Future Orders        Priority Expected Expires Ordered    Hemoglobin A1C ([facility]) Routine 6/21/2018 7/21/2018 3/21/2018            Who to contact     If you have questions or need follow up information about today's clinic visit or your schedule please contact [facility] directly at [phone].  Normal or non-critical lab and imaging results will be communicated to you by DearJanehart, letter or phone within 4 business days after the clinic has received the results. If you do not hear from us within 7 days, please contact the clinic through rumr: turn off the lightst or phone. If you have a critical or abnormal lab result, we will notify you by phone as soon as possible.  Submit refill requests through [email or web address] or call your pharmacy and they will forward the refill request to us. Please allow 3 business days for your refill to be completed.          Additional Information About Your Visit        DearJanehart Information     [email or web address] gives you secure access to your electronic health record. If you see a primary care provider, you can also send messages to your care team and make appointments. If you have questions, please call your primary care clinic.  If you do not have a primary care provider, please call [phone] and they will assist you.        Care EveryWhere ID     This is your Care EveryWhere ID. This could be used by other organizations to access your [place] medical records  [id number]        Your Vitals Were     Pulse Respirations Pulse Oximetry BMI (Body  Mass Index)          67 16 98% 25.47 kg/m2         Blood Pressure from Last 3 Encounters:   03/21/18 130/74   03/09/18 140/83   03/06/18 108/72    Weight from Last 3 Encounters:   03/21/18 70.5 kg (155 lb 6.4 oz)   03/06/18 69.9 kg (154 lb)   03/06/18 72.8 kg (160 lb 9.6 oz)               Primary Care Provider Office Phone # Fax #    Toni Gan [phone] [address]  [facility] [address]        Equal Access to Services     [facility]: Hadii aad ku hadasho Soomaali, waaxda luqadaha, qaybta kaalmada adeegyada, estuardo calderon haysameern will pagan . So [facility] [phone].    ATENCIÓN: Si habla español, tiene a coleman disposición servicios gratuitos de asistencia lingüística. Llame al [phone].    We comply with applicable federal civil rights laws and [place] laws. We do not discriminate on the basis of race, color, national origin, age, disability, sex, sexual orientation, or gender identity.            Thank you!     Thank you for choosing [facility]  for your care. Our goal is always to provide you with excellent care. Hearing back from our patients is one way we can continue to improve our services. Please take a few minutes to complete the written survey that you may receive in the mail after your visit with us. Thank you!             Your Updated Medication List - Protect others around you: Learn how to safely use, store and throw away your medicines at www.disposemymeds.org.          This list is accurate as of 3/21/18  5:14 PM.  Always use your most recent med list.                   Brand Name Dispense Instructions for use Diagnosis    IBUPROFEN PO      Take 200-400 mg by mouth as needed for moderate pain Reported on 2/22/2017

## 2018-08-04 ENCOUNTER — HEALTH MAINTENANCE LETTER (OUTPATIENT)
Age: 58
End: 2018-08-04

## 2018-08-16 PROBLEM — K57.92 ACUTE DIVERTICULITIS: Status: RESOLVED | Noted: 2018-03-06 | Resolved: 2018-08-16

## 2018-08-17 ENCOUNTER — OFFICE VISIT (OUTPATIENT)
Dept: FAMILY MEDICINE | Facility: CLINIC | Age: 58
End: 2018-08-17

## 2018-08-17 VITALS
HEART RATE: 68 BPM | TEMPERATURE: 98.3 F | OXYGEN SATURATION: 97 % | BODY MASS INDEX: 25.56 KG/M2 | DIASTOLIC BLOOD PRESSURE: 74 MMHG | WEIGHT: 156 LBS | RESPIRATION RATE: 16 BRPM | SYSTOLIC BLOOD PRESSURE: 120 MMHG

## 2018-08-17 DIAGNOSIS — R11.2 NAUSEA AND VOMITING, INTRACTABILITY OF VOMITING NOT SPECIFIED, UNSPECIFIED VOMITING TYPE: Primary | ICD-10-CM

## 2018-08-17 DIAGNOSIS — Z87.19 HX OF DIVERTICULITIS OF COLON: ICD-10-CM

## 2018-08-17 DIAGNOSIS — R53.83 FATIGUE, UNSPECIFIED TYPE: ICD-10-CM

## 2018-08-17 LAB
% GRANULOCYTES: 46.5 % (ref 42.2–75.2)
HCT VFR BLD AUTO: 42.3 % (ref 35–46)
HEMOGLOBIN: 14.2 G/DL (ref 11.8–15.5)
LYMPHOCYTES NFR BLD AUTO: 45.8 % (ref 20.5–51.1)
MCH RBC QN AUTO: 31.4 PG (ref 27–31)
MCHC RBC AUTO-ENTMCNC: 33.5 G/DL (ref 33–37)
MCV RBC AUTO: 93.7 FL (ref 80–100)
MONOCYTES NFR BLD AUTO: 7.7 % (ref 1.7–9.3)
PLATELET # BLD AUTO: 338 K/UL (ref 140–450)
RBC # BLD AUTO: 4.52 X10/CMM (ref 3.7–5.2)
WBC # BLD AUTO: 6.4 X10/CMM (ref 3.8–11)

## 2018-08-17 PROCEDURE — 36415 COLL VENOUS BLD VENIPUNCTURE: CPT | Performed by: FAMILY MEDICINE

## 2018-08-17 PROCEDURE — 85025 COMPLETE CBC W/AUTO DIFF WBC: CPT | Performed by: FAMILY MEDICINE

## 2018-08-17 PROCEDURE — 99214 OFFICE O/P EST MOD 30 MIN: CPT | Performed by: FAMILY MEDICINE

## 2018-08-17 PROCEDURE — 80053 COMPREHEN METABOLIC PANEL: CPT | Mod: 90 | Performed by: FAMILY MEDICINE

## 2018-08-17 PROCEDURE — 83690 ASSAY OF LIPASE: CPT | Mod: 90 | Performed by: FAMILY MEDICINE

## 2018-08-17 PROCEDURE — 82150 ASSAY OF AMYLASE: CPT | Mod: 90 | Performed by: FAMILY MEDICINE

## 2018-08-17 NOTE — PROGRESS NOTES
Problem(s) Oriented visit      SUBJECTIVE:                                                    Leticia Tompkins is a 57 year old female who presents to clinic today for:  Patient presents with:  Nausea: started yesterday morning vomit 4 times     Pt is well known to [facility]; prior PCP Dr. Toni Gan retired 4/2018. Pt is new to me today.     Here for sudden onset of GI distress. Had a day of n/v and feeling very fatigued. Had a complex bout w/ diverticulitis in March. Req'd several days of hospitalization and abscess drainage. She is feeling better today but is highly motivated to catch any similar problems early. This is exactly how her symptoms presented in March 2018.     Earlier in the week felt great.   Wed had Mexican food at work. Ate perhaps more than she usually would No problems w/ cheese and dairy. Had Mexican food in the past w/o any ill effects. That night, attended a  and had wine and cheese. Also previously tolerated without any problems.    [place] Felt awful. N/v much of the day. Abdomen felt distended. No fevers or rash. Urinated OK. Stay well hydrated. Slept all day and all night without interruption.   Felt better this morning when she woke up. Still feels weak overall today, but otherwise back to nml. Tolerated a little food earlier today. Slept OK last night.  Stooling OK today. No urination problems  No known lactose. No known gluten intolerance.   Going on anaya next week - wants to feel well while on anaya.   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   Not on any meds. Not taking any OTCs.   Did not take anything for nausea - states didn't feel nauseated for long - just long enough to vomit whatever she had eaten.     Problem list, Medication list, Allergies, and Medical/Social/Surgical histories reviewed in Postmaster and updated as appropriate.     ROS:  10 point ROS completed and negative except noted above, including Gen,  HEENT, CV, Resp, GI, , MS, Neurologic, Psych    Histories:   Patient Active Problem List   Diagnosis     Lumbago     Carotene pigmentation of skin     CARDIOVASCULAR SCREENING; LDL GOAL LESS THAN 160     Health Care Home     Stress incontinence of urine     Cystocele, midline     Diverticulitis of colon     Acquired hallux valgus of right foot     Past Medical History:   Diagnosis Date     Depressive disorder, not elsewhere classified 1997    treated with Zoloft for 8 months     Lumbago     L5-S1 radiculopathy     Spider veins      Tachycardia, unspecified 2004    Single episode treated with Adenosine     Past Surgical History:   Procedure Laterality Date     BLADDER SURGERY       C NONSPECIFIC PROCEDURE  1991    Tubal ligation     C NONSPECIFIC PROCEDURE      Lasik eye surgery     C NONSPECIFIC PROCEDURE  3/07     L5-S1 discectomy     CYSTOCELE REPAIR N/A 0/2013     HYSTERECTOMY N/A 05/20013     ORTHOPEDIC SURGERY Left 4-19-16    left foot bunionectomy     ORTHOPEDIC SURGERY      Foot surgery      RECTOCELE REPAIR N/A 05/2013     Social History   Substance Use Topics     Smoking status: Former Smoker     Types: Cigarettes     Quit date: 3/12/1983     Smokeless tobacco: Never Used     Alcohol use 0.0 - 1.5 oz/week     0 - 3 Glasses of wine per week      Comment: socially     Family History   Problem Relation Age of Onset     Diabetes Mother      Cardiovascular Mother      a fib     Lipids Mother      Melanoma Father 77     Neuromuscular Disease Brother      severe progressive MS       OBJECTIVE:                                                    /74  Pulse 68  Temp 98.3  F (36.8  C)  Resp 16  Wt 70.8 kg (156 lb)  SpO2 97%  BMI 25.56 kg/m2  Body mass index is 25.56 kg/(m^2).   Gen: Cooperative. No acute distress. Appears fit and younger than stated age.   Eyes: PERRL, sclera white.   Ears/Nose/Mouth/Throat: Normal pinnae and ear canals, TMs without erythema or effusion. Oropharynx mucous membranes  moist, without lesions, erythema, or exudate.   Neck: Supple, without masses, lymphadenopathy or tenderness.   Heart: Regular rate and rhythm without murmurs, rubs, or gallops.   Lungs: Normal respiratory effort. Lungs are clear to auscultation. Good breath sounds bilaterally.   Abdomen: Soft, nontender, hyperactive bowel sounds present, but at normal pitch. No obvious masses or organomegaly. No rebound or guarding.   Musculoskeletal: No lower extremity edema.   Skin: Warm and well perfused.   Neurologic: Alert, nonfocal.   Psych:  Mood and affect are appropriate.     ASSESSMENT/PLAN:                                                      Leticia was seen today for nausea. Diff dx is long. I reviewed her medical records - certainly it is understandable to avoid problems of earlier this year. While I might not ordinarily scan abd when she is feeling better, it is concerning her symptoms are exactly the same this time as in March.     Diagnoses and all orders for this visit:    Nausea and vomiting, intractability of vomiting not specified, unspecified vomiting type  -     CBC with Diff/Plt ([facility])  -     Comp. Metabolic Panel (14) ([facility])  -     Lipase  Serum ([facility])  -     Amylase  Serum ([facility])  -     Referral to [facility] - CT abd/pelvis   Offered Zofran - she declines this today.     Fatigue, unspecified type  -     CBC with Diff/Plt ([facility])  -     Comp. Metabolic Panel (14) ([facility])  -     Referral to [facility]    Hx of diverticulitis of colon  -     Comp. Metabolic Panel (14) ([facility])  -     Referral to [facility]    >25 min spent with patient, greater than 50% spent on discussion/education/planning, etc. About The primary encounter diagnosis was Nausea and vomiting, intractability of vomiting not specified, unspecified vomiting type. Diagnoses of Fatigue, unspecified type and Hx of diverticulitis of colon were also pertinent to this visit.    The following health maintenance items are  reviewed in Epic and correct as of today:  Health Maintenance   Topic Date Due     PHQ-2 Q1 YR  09/28/1970     HIV SCREEN (SYSTEM ASSIGNED)  09/28/1976     HEPATITIS C SCREENING  09/28/1976     ADVANCE DIRECTIVE PLANNING Q5 YRS  09/28/2013     PAP SCREENING Q3 YR (SYSTEM ASSIGNED)  05/29/2018     INFLUENZA VACCINE (1) 09/01/2018     MAMMO SCREEN Q2 YR (SYSTEM ASSIGNED)  03/06/2019     LIPID SCREEN Q5 YR FEMALE (SYSTEM ASSIGNED)  05/29/2020     COLON CANCER SCREEN (SYSTEM ASSIGNED)  03/26/2023     TETANUS IMMUNIZATION (SYSTEM ASSIGNED)  04/05/2026       Kajal Becker MD  Family Medicine    For any issues, please call my office  [facility] at [phone].

## 2018-08-17 NOTE — MR AVS SNAPSHOT
After Visit Summary   8/17/2018    Leticia Tompkins    MRN: [id number]           Patient Information     Date Of Birth          1960        Visit Information        Provider Department      8/17/2018 12:00 PM Kajal Becker MD [facility]        Today's Diagnoses     Nausea and vomiting, intractability of vomiting not specified, unspecified vomiting type    -  1    Fatigue, unspecified type        Hx of diverticulitis of colon           Follow-ups after your visit        Additional Services     Referral to [facility]       [facility]  Phone: [phone]  Fax: [phone]  Reason for referral: CT abd and pelvis, now recurrent n/v, recent hx of diverticulitis w/ abscess drainage.                  Who to contact     If you have questions or need follow up information about today's clinic visit or your schedule please contact [facility] directly at [phone].  Normal or non-critical lab and imaging results will be communicated to you by Cupplehart, letter or phone within 4 business days after the clinic has received the results. If you do not hear from us within 7 days, please contact the clinic through Cupplehart or phone. If you have a critical or abnormal lab result, we will notify you by phone as soon as possible.  Submit refill requests through CiRBA or call your pharmacy and they will forward the refill request to us. Please allow 3 business days for your refill to be completed.          Additional Information About Your Visit        MyChart Information     CiRBA gives you secure access to your electronic health record. If you see a primary care provider, you can also send messages to your care team and make appointments. If you have questions, please call your primary care clinic.  If you do not have a primary care provider, please call [phone] and they will assist you.        Care EveryWhere ID     This is your Care EveryWhere ID.  This could be used by other organizations to access your [place] medical records  [id number]        Your Vitals Were     Pulse Temperature Respirations Pulse Oximetry BMI (Body Mass Index)       68 98.3  F (36.8  C) 16 97% 25.56 kg/m2        Blood Pressure from Last 3 Encounters:   08/17/18 120/74   03/21/18 130/74   03/09/18 140/83    Weight from Last 3 Encounters:   08/17/18 70.8 kg (156 lb)   03/21/18 70.5 kg (155 lb 6.4 oz)   03/06/18 69.9 kg (154 lb)              We Performed the Following     Amylase  Serum ([facility])     CBC with Diff/Plt ([facility])     Comp. Metabolic Panel (14) ([facility])     Lipase  Serum ([facility])     Referral to [facility]        Primary Care Provider Office Phone # Fax #    Kajal Keesha Becker [phone] [address] [facility] [id number]        Equal Access to Services     SAQIB ALEMAN : Hadii aad ku hadasho Soomaali, waaxda luqadaha, qaybta kaalmada adeegyada, waxay idiin hayaan will kharanhi pagan . So [facility] [phone].    ATENCIÓN: Si habla español, tiene a coleman disposición servicios gratuitos de asistencia lingüística. [facility] [phone].    We comply with applicable federal civil rights laws and [place] laws. We do not discriminate on the basis of race, color, national origin, age, disability, sex, sexual orientation, or gender identity.            Thank you!     Thank you for choosing [facility]  for your care. Our goal is always to provide you with excellent care. Hearing back from our patients is one way we can continue to improve our services. Please take a few minutes to complete the written survey that you may receive in the mail after your visit with us. Thank you!             Your Updated Medication List - Protect others around you: Learn how to safely use, store and throw away your medicines at www.disposemymeds.org.          This list is accurate as of 8/17/18 12:29 PM.  Always use your most recent med list.                    Brand Name Dispense Instructions for use Diagnosis    IBUPROFEN PO      Take 200-400 mg by mouth as needed for moderate pain Reported on 2/22/2017

## 2018-08-18 LAB
ALBUMIN SERPL-MCNC: 4.5 G/DL (ref 3.5–5.5)
ALBUMIN/GLOB SERPL: 1.7 {RATIO} (ref 1.2–2.2)
ALP SERPL-CCNC: 43 IU/L (ref 39–117)
ALT SERPL-CCNC: 15 IU/L (ref 0–32)
AMYLASE SERPL-CCNC: 37 U/L (ref 31–124)
AST SERPL-CCNC: 18 IU/L (ref 0–40)
BILIRUB SERPL-MCNC: 0.4 MG/DL (ref 0–1.2)
BUN SERPL-MCNC: 9 MG/DL (ref 6–24)
BUN/CREATININE RATIO: 13 (ref 9–23)
CALCIUM SERPL-MCNC: 9.7 MG/DL (ref 8.7–10.2)
CHLORIDE SERPLBLD-SCNC: 101 MMOL/L (ref 96–106)
CREAT SERPL-MCNC: 0.69 MG/DL (ref 0.57–1)
EGFR IF AFRICN AM: 110 ML/MIN/1.73
EGFR IF NONAFRICN AM: 96 ML/MIN/1.73
GLOBULIN, TOTAL: 2.6 G/DL (ref 1.5–4.5)
GLUCOSE SERPL-MCNC: 124 MG/DL (ref 65–99)
LIPASE SERPL-CCNC: 31 U/L (ref 14–72)
POTASSIUM SERPL-SCNC: 4.1 MMOL/L (ref 3.5–5.2)
PROT SERPL-MCNC: 7.1 G/DL (ref 6–8.5)
SODIUM SERPL-SCNC: 142 MMOL/L (ref 134–144)
TOTAL CO2: 25 MMOL/L (ref 20–29)

## 2018-08-20 ENCOUNTER — TRANSFERRED RECORDS (OUTPATIENT)
Dept: FAMILY MEDICINE | Facility: CLINIC | Age: 58
End: 2018-08-20

## 2018-08-21 PROBLEM — K57.30 DIVERTICULOSIS OF LARGE INTESTINE WITHOUT HEMORRHAGE: Chronic | Status: ACTIVE | Noted: 2018-08-21

## 2018-08-21 PROBLEM — K57.32 DIVERTICULITIS OF COLON: Status: RESOLVED | Noted: 2018-03-21 | Resolved: 2018-08-21

## 2018-10-22 ENCOUNTER — HOSPITAL ENCOUNTER (OUTPATIENT)
Dept: MAMMOGRAPHY | Facility: CLINIC | Age: 58
Discharge: HOME | End: 2018-10-22
Attending: FAMILY MEDICINE | Admitting: FAMILY MEDICINE
Payer: COMMERCIAL

## 2018-10-22 DIAGNOSIS — Z12.31 VISIT FOR SCREENING MAMMOGRAM: ICD-10-CM

## 2018-10-22 PROCEDURE — 77063 BREAST TOMOSYNTHESIS BI: CPT

## 2018-12-02 NOTE — PROGRESS NOTES
"     SUBJECTIVE:   CC: Leticia Tompkins is an 58 year old woman who presents for preventive health visit.     Feeling blah. Not interested in things she used to enjoy.   Quit the job she did not care for.   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   Not missing work due to her mood. Working 25 hrs/week.  Sleep pretty well.   Walks around [facility] and [facility]. Tries to stay physically active.   Lost interest in most things. No particular trigger.   Feeling old and useless and boring and blah.   Had depression in 1995 - no SI/HI. Took Zoloft for several months. Helped w/ emotions but markedly decreased her libido. Not interested in taking Zoloft again.   Not on any meds now. Is interested in starting something if indicated.   Using SAD light regularly, but not as helpful this time as in the past.   Having hot flashes, no vag dryness.   Irritability present by her account.   She questions if her hormones could be to blame.     Having diffuse joint pain - present for months; getting worse. No erythema. ? Edema. No known trauma.   No known dx of autoimmune diseases.  B thumbs painful. Wrists painful. L hip hurting, but getting better.   If avoid wheat joints feel better. Read book Wheat Belly. Helping her guide her diet.   May have had a couple of days of diverticulitis after Thanksgiving. Better now.     Has pre-DM. Tries to eat healthy, but has been craving carbs. Mother had DM.     Healthy Habits:    Do you get at least three servings of calcium containing foods daily (dairy, green leafy vegetables, etc.)? yes    Amount of exercise or daily activities, outside of work: 6 day(s) per week    Problems taking medications regularly No    Medication side effects: No    Have you had an eye exam in the past two years? yes    Do you see a dentist twice per year? yes    Do you have sleep apnea, excessive snoring or daytime drowsiness?no      HEARING FREQUENCY:     Right Ear:     500 Hz : Pass   " 1000 Hz: Pass   2000 Hz: Pass   4000 Hz: Pass  Left Ear:     500 Hz :  Pass   1000 Hz: Pass   2000 Hz: Pass   4000 Hz: Pass    Eloise German [facility]    Today's PHQ-2 Score:   PHQ-2 (  [employer]) 12/3/2018 2015   Q1: Little interest or pleasure in doing things 1 0   Q2: Feeling down, depressed or hopeless 1 0   PHQ-2 Score 2 0     PHQ-9 SCORE 12/3/2018   PHQ-9 Total Score 11     SUDHIR-7 SCORE 12/3/2018   Total Score 1     Abuse: Current or Past(Physical, Sexual or Emotional) - No  Do you feel safe in your environment? Yes    Social History     Tobacco Use     Smoking status: Former Smoker     Types: Cigarettes     Last attempt to quit: 3/12/1983     Years since quittin.7     Smokeless tobacco: Never Used   Substance Use Topics     Alcohol use: Yes     Alcohol/week: 0.0 - 1.5 oz     Comment: socially     If you drink alcohol do you typically have >3 drinks per day or >7 drinks per week? No                     Mammo .   Had SUPRACERVICAL hysterectomy.   No DEXA on file yet - not yet indicated. Takes Vit D seaonal. Does not eat a lot dairy. Eats green veggies. Mother did not have known dx of osteoporosis.     Reviewed orders with patient.  Reviewed health maintenance and updated orders accordingly - Yes  BP Readings from Last 3 Encounters:   18 126/86   18 120/74   18 130/74    Wt Readings from Last 3 Encounters:   18 71.9 kg (158 lb 9.6 oz)   18 70.8 kg (156 lb)   18 70.5 kg (155 lb 6.4 oz)         Patient Active Problem List   Diagnosis     Lumbago     Carotene pigmentation of skin     CARDIOVASCULAR SCREENING; LDL GOAL LESS THAN 160     Health Care Home     Stress incontinence of urine     Cystocele, midline     Acquired hallux valgus of right foot     Diverticulosis of large intestine without hemorrhage     Pre-diabetes     Elevated C-reactive protein (CRP)     Elevated sed rate     Past Surgical History:   Procedure Laterality Date     BLADDER SURGERY       C  NONSPECIFIC PROCEDURE      Tubal ligation     C NONSPECIFIC PROCEDURE      Lasik eye surgery     C NONSPECIFIC PROCEDURE  3/07     L5-S1 discectomy     CYSTOCELE REPAIR N/A      HYSTERECTOMY N/A     Supracervical. See Path via Care Everywhere at [facility].      ORTHOPEDIC SURGERY Left 16    left foot bunionectomy     ORTHOPEDIC SURGERY      Foot surgery      RECTOCELE REPAIR N/A 2013       Social History     Tobacco Use     Smoking status: Former Smoker     Types: Cigarettes     Last attempt to quit: 3/12/1983     Years since quittin.7     Smokeless tobacco: Never Used   Substance Use Topics     Alcohol use: Yes     Alcohol/week: 0.0 - 1.5 oz     Comment: socially     Family History   Problem Relation Age of Onset     Diabetes Mother      Cardiovascular Mother         a fib     Lipids Mother      Melanoma Father 77     Diabetes Brother      Neuromuscular Disease Brother         severe progressive MS - dx'd          Current Outpatient Medications   Medication Sig Dispense Refill     IBUPROFEN PO Take 200-400 mg by mouth as needed for moderate pain Reported on 2017       buPROPion (WELLBUTRIN XL) 150 MG 24 hr tablet Take 1 tablet (150 mg) by mouth every morning 30 tablet 1     metFORMIN (GLUCOPHAGE-XR) 500 MG 24 hr tablet Take 1 tablet (500 mg) by mouth daily (with dinner) 90 tablet 3     No Known Allergies  Recent Labs   Lab Test 18  1213 18  1101 18  1238 18  0754 18  0753  18  1327 16  1625 05/29/15  0903   A1C 6.4*  --   --   --   --   --  6.2* 5.8*  --    LDL  --  159*  --   --   --   --   --   --  148*   HDL  --  70  --   --   --   --   --   --  74   TRIG  --  88  --   --   --   --   --   --  57   ALT  --  18 15 23  --    < >  --   --   --    CR  --  0.68 0.69 0.57 0.60   < >  --   --  0.77   GFRESTIMATED  --   --   --  >90 >90   < >  --   --   --    GFRESTBLACK  --   --   --  >90 >90   < >  --   --   --    POTASSIUM  --  4.8 4.1 4.0 3.7    < >  --   --  4.3    < > = values in this interval not displayed.      Mammogram Screening: Patient over age 50, mutual decision to screen reflected in health maintenance.  Pertinent mammograms are reviewed under the imaging tab. Last Oct 2018. Wnl.   [place]  History of abnormal Pap smear: Last 3 Pap and HPV Results:   No recent abnormals.   Pap at [facility] 11/19/2013 at [facility]. Nml Paps in 2010, 2009, 2008, 2007, 2005 all wnl. See records at [facility].      Reviewed and updated as needed this visit by clinical staff  Tobacco  Allergies  Meds         Reviewed and updated as needed this visit by Provider        Past Medical History:   Diagnosis Date     Depressive disorder, not elsewhere classified 1997    treated with Zoloft for 8 months     Lumbago     L5-S1 radiculopathy     Spider veins      Tachycardia, unspecified 2004    Single episode treated with Adenosine      Past Surgical History:   Procedure Laterality Date     BLADDER SURGERY       C NONSPECIFIC PROCEDURE  1991    Tubal ligation     C NONSPECIFIC PROCEDURE      Lasik eye surgery     C NONSPECIFIC PROCEDURE  3/07     L5-S1 discectomy     CYSTOCELE REPAIR N/A 0/2013     HYSTERECTOMY N/A 05/23/20014    Supracervical. See Path via Care Everywhere at [facility].      ORTHOPEDIC SURGERY Left 4-19-16    left foot bunionectomy     ORTHOPEDIC SURGERY      Foot surgery      RECTOCELE REPAIR N/A 05/2013     ROS:  CONSTITUTIONAL: NEGATIVE for fever, chills, change in weight  INTEGUMENTARY/SKIN: NEGATIVE for worrisome rashes, moles or lesions  EYES: NEGATIVE for vision changes or irritation  ENT: NEGATIVE for ear, mouth and throat problems  RESP: NEGATIVE for significant cough or SOB  BREAST: NEGATIVE for masses, tenderness or discharge  CV: NEGATIVE for chest pain, palpitations or peripheral edema  GI: NEGATIVE for nausea, abdominal pain, heartburn, or change in bowel habits  : NEGATIVE for unusual urinary or vaginal symptoms. No vaginal bleeding.  MUSCULOSKELETAL: POSITIVE for  "significant arthralgias or myalgia  NEURO: NEGATIVE for weakness, dizziness or paresthesias  PSYCHIATRIC: POSITIVE for changes in mood or affect     OBJECTIVE:   /86   Pulse 70   Resp 16   Ht 1.638 m (5' 4.5\")   Wt 71.9 kg (158 lb 9.6 oz)   SpO2 97%   BMI 26.80 kg/m    EXAM:  GENERAL APPEARANCE: healthy, alert and no distress. Appears fit.   EYES: Eyes grossly normal to inspection, PERRL and conjunctivae and sclerae normal  HENT: ear canals and TM's normal, nose and mouth without ulcers or lesions, oropharynx clear and oral mucous membranes moist  NECK: no adenopathy, no asymmetry, masses, or scars and thyroid normal to palpation  RESP: lungs clear to auscultation - no rales, rhonchi or wheezes  BREAST: normal without masses, tenderness or nipple discharge and no palpable axillary masses or adenopathy  CV: regular rate and rhythm, normal S1 S2, no S3 or S4, no murmur, click or rub, no peripheral edema and peripheral pulses strong  ABDOMEN: soft, nontender, no hepatosplenomegaly, no masses and bowel sounds normal  MS: no musculoskeletal defects are noted and gait is age appropriate without ataxia. Full ROM in joints present. No joint erythema or edema.   SKIN: no suspicious lesions or rashes  UroGun: normal external genitalia. Vaginal mucosa is pink and moist. Cervix is very posterior. No palpable adnexal masses.   NEURO: Normal strength and tone, sensory exam grossly normal, mentation intact and speech normal  PSYCH: mentation appears normal and affect is a little flat today c/w her her concerns/reports of recent mood.     ASSESSMENT/PLAN:   Leticia was seen today for physical, hearing screening, depression, joint pain and consult.    Diagnoses and all orders for this visit:    Routine general medical examination at a health care facility    Fatigue, unspecified type  -     TSH ([facility])  -     CBC with Diff/Plt ([facility])  -     Comp. Metabolic Panel (14) ([facility])  -     C-Reactive Protein  Quant ([facility])  -  " "   Sed Rate Westergren ([facility])   Discussed trial of Prednisone. Will await results first.     Mood change   Etiology unclear. Suspect multifactorial.    Will check labs - if all wnl, then start Wellbutrin. Else, tx abnormals.    I usually refer out for hormone testing if requested. Aware lab results.      Elevated LDL cholesterol level  -     Lipid Panel ([facility])    Screening for diabetes mellitus  -     Comp. Metabolic Panel (14) ([facility])    Screening cholesterol level  -     Lipid Panel ([facility])    Screening for cervical cancer  -     Pap IG rfx HPV ASCU ([facility])    Elevated fasting blood sugar  -     Hemoglobin A1C ([facility])    Wellbutrin if labs OK.   Ref for hormone testing if labs all OK.    COUNSELING:   Reviewed preventive health counseling, as reflected in patient instructions       Regular exercise       Healthy diet/nutrition       Immunizations    Not due for any immunizations today. Has already completed Hep A and B, annual influenza. Has had Tdap as adult and last Td was 2016. Discussed Shingrix, however we do not have any of this vaccine at this time. I wrote out recommendations on the AVS recommending she inquire about wait lists at her pharmacy.        Osteoporosis Prevention/Bone Health       Colon cancer screening       The 10-year ASCVD risk score (Vivienne JAYLEN Jr., et al., 2013) is: 3.2%    Values used to calculate the score:      Age: 58 years      Sex: Female      Is Non- : No      Diabetic: No      Tobacco smoker: No      Systolic Blood Pressure: 126 mmHg      Is BP treated: No      HDL Cholesterol: 70 mg/dL      Total Cholesterol: 247 mg/dL    BP Readings from Last 1 Encounters:   12/03/18 126/86     Estimated body mass index is 26.8 kg/m  as calculated from the following:    Height as of this encounter: 1.638 m (5' 4.5\").    Weight as of this encounter: 71.9 kg (158 lb 9.6 oz).    BP Screening:   Last 3 BP Readings:    BP Readings from Last 3 Encounters:   12/03/18 " 126/86   08/17/18 120/74   03/21/18 130/74     The following was recommended to the patient:  Re-screen BP within a year and recommended lifestyle modifications  Weight management plan: Discussed healthy diet and exercise guidelines     reports that she quit smoking about 35 years ago. Her smoking use included cigarettes. she has never used smokeless tobacco.      Counseling Resources:  ATP IV Guidelines  Pooled Cohorts Equation Calculator  Breast Cancer Risk Calculator  FRAX Risk Assessment  ICSI Preventive Guidelines  Dietary Guidelines for Americans, 2010  [employer]'s MyPlate  ASA Prophylaxis  Lung CA Screening    Kajal Becker MD  [facility]    Health Maintenance   Topic Date Due     HIV SCREEN (SYSTEM ASSIGNED)  09/28/1976     HEPATITIS C SCREENING  09/28/1976     ZOSTER IMMUNIZATION (1 of 2) 09/28/2008     ADVANCE DIRECTIVE PLANNING Q5 YRS  09/28/2013     PHQ-2 Q1 YR  12/03/2019     MAMMO SCREEN Q2 YR (SYSTEM ASSIGNED)  10/22/2020     PAP SCREENING Q3 YR (SYSTEM ASSIGNED)  12/03/2021     COLON CANCER SCREEN (SYSTEM ASSIGNED)  03/26/2023     LIPID SCREEN Q5 YR FEMALE (SYSTEM ASSIGNED)  12/03/2023     DTAP/TDAP/TD IMMUNIZATION (2 - Td) 04/05/2026     INFLUENZA VACCINE  Completed     IPV IMMUNIZATION  Aged Out     MENINGITIS IMMUNIZATION  Aged Out

## 2018-12-03 ENCOUNTER — OFFICE VISIT (OUTPATIENT)
Dept: FAMILY MEDICINE | Facility: CLINIC | Age: 58
End: 2018-12-03

## 2018-12-03 VITALS
SYSTOLIC BLOOD PRESSURE: 126 MMHG | BODY MASS INDEX: 26.42 KG/M2 | HEIGHT: 65 IN | RESPIRATION RATE: 16 BRPM | OXYGEN SATURATION: 97 % | DIASTOLIC BLOOD PRESSURE: 86 MMHG | HEART RATE: 70 BPM | WEIGHT: 158.6 LBS

## 2018-12-03 DIAGNOSIS — Z00.00 ROUTINE GENERAL MEDICAL EXAMINATION AT A HEALTH CARE FACILITY: Primary | ICD-10-CM

## 2018-12-03 DIAGNOSIS — Z13.220 SCREENING CHOLESTEROL LEVEL: ICD-10-CM

## 2018-12-03 DIAGNOSIS — E78.00 ELEVATED LDL CHOLESTEROL LEVEL: ICD-10-CM

## 2018-12-03 DIAGNOSIS — R45.86 MOOD CHANGE: ICD-10-CM

## 2018-12-03 DIAGNOSIS — R73.01 ELEVATED FASTING BLOOD SUGAR: ICD-10-CM

## 2018-12-03 DIAGNOSIS — R53.83 FATIGUE, UNSPECIFIED TYPE: ICD-10-CM

## 2018-12-03 DIAGNOSIS — Z13.1 SCREENING FOR DIABETES MELLITUS: ICD-10-CM

## 2018-12-03 DIAGNOSIS — Z12.4 SCREENING FOR CERVICAL CANCER: ICD-10-CM

## 2018-12-03 LAB
% GRANULOCYTES: 66.3 % (ref 42.2–75.2)
HCT VFR BLD AUTO: 44.3 % (ref 35–46)
HEMOGLOBIN: 14.3 G/DL (ref 11.8–15.5)
LYMPHOCYTES NFR BLD AUTO: 26.4 % (ref 20.5–51.1)
MCH RBC QN AUTO: 30.8 PG (ref 27–31)
MCHC RBC AUTO-ENTMCNC: 32.3 G/DL (ref 33–37)
MCV RBC AUTO: 95.2 FL (ref 80–100)
MONOCYTES NFR BLD AUTO: 7.3 % (ref 1.7–9.3)
PLATELET # BLD AUTO: 448 K/UL (ref 140–450)
RBC # BLD AUTO: 4.66 X10/CMM (ref 3.7–5.2)
WBC # BLD AUTO: 8.2 X10/CMM (ref 3.8–11)

## 2018-12-03 PROCEDURE — 99396 PREV VISIT EST AGE 40-64: CPT | Performed by: FAMILY MEDICINE

## 2018-12-03 PROCEDURE — 85651 RBC SED RATE NONAUTOMATED: CPT | Performed by: FAMILY MEDICINE

## 2018-12-03 PROCEDURE — 86140 C-REACTIVE PROTEIN: CPT | Mod: 90 | Performed by: FAMILY MEDICINE

## 2018-12-03 PROCEDURE — 84443 ASSAY THYROID STIM HORMONE: CPT | Mod: 90 | Performed by: FAMILY MEDICINE

## 2018-12-03 PROCEDURE — 80061 LIPID PANEL: CPT | Mod: 90 | Performed by: FAMILY MEDICINE

## 2018-12-03 PROCEDURE — 85025 COMPLETE CBC W/AUTO DIFF WBC: CPT | Performed by: FAMILY MEDICINE

## 2018-12-03 PROCEDURE — 36415 COLL VENOUS BLD VENIPUNCTURE: CPT | Performed by: FAMILY MEDICINE

## 2018-12-03 PROCEDURE — 99213 OFFICE O/P EST LOW 20 MIN: CPT | Mod: 25 | Performed by: FAMILY MEDICINE

## 2018-12-03 PROCEDURE — 83036 HEMOGLOBIN GLYCOSYLATED A1C: CPT | Mod: 90 | Performed by: FAMILY MEDICINE

## 2018-12-03 PROCEDURE — 80053 COMPREHEN METABOLIC PANEL: CPT | Mod: 90 | Performed by: FAMILY MEDICINE

## 2018-12-03 ASSESSMENT — ANXIETY QUESTIONNAIRES
2. NOT BEING ABLE TO STOP OR CONTROL WORRYING: NOT AT ALL
3. WORRYING TOO MUCH ABOUT DIFFERENT THINGS: NOT AT ALL
GAD7 TOTAL SCORE: 1
1. FEELING NERVOUS, ANXIOUS, OR ON EDGE: NOT AT ALL
5. BEING SO RESTLESS THAT IT IS HARD TO SIT STILL: NOT AT ALL
6. BECOMING EASILY ANNOYED OR IRRITABLE: SEVERAL DAYS
7. FEELING AFRAID AS IF SOMETHING AWFUL MIGHT HAPPEN: NOT AT ALL
IF YOU CHECKED OFF ANY PROBLEMS ON THIS QUESTIONNAIRE, HOW DIFFICULT HAVE THESE PROBLEMS MADE IT FOR YOU TO DO YOUR WORK, TAKE CARE OF THINGS AT HOME, OR GET ALONG WITH OTHER PEOPLE: SOMEWHAT DIFFICULT

## 2018-12-03 ASSESSMENT — PATIENT HEALTH QUESTIONNAIRE - PHQ9
SUM OF ALL RESPONSES TO PHQ QUESTIONS 1-9: 11
5. POOR APPETITE OR OVEREATING: NOT AT ALL

## 2018-12-03 NOTE — MR AVS SNAPSHOT
After Visit Summary   12/3/2018    Leticia Tompkins    MRN: [id number]           Patient Information     Date Of Birth          1960        Visit Information        Provider Department      12/3/2018 9:00 AM Kajal Becker MD [facility]        Today's Diagnoses     Routine general medical examination at a health care facility    -  1    Fatigue, unspecified type        Mood change        Elevated LDL cholesterol level        Screening for diabetes mellitus        Screening cholesterol level        Screening for cervical cancer          Care Instructions      Preventive Health Recommendations  Female Ages 50 - 64    Yearly exam: See your health care provider every year in order to  o Review health changes.   o Discuss preventive care.    o Review your medicines if your doctor has prescribed any.      Get a Pap test every three years (unless you have an abnormal result and your provider advises testing more often).    If you get Pap tests with HPV test, you only need to test every 5 years, unless you have an abnormal result.     You do not need a Pap test if your uterus was removed (hysterectomy) and you have not had cancer.    You should be tested each year for STDs (sexually transmitted diseases) if you're at risk.     Have a mammogram every 1 to 2 years.    Have a colonoscopy at age 50, or have a yearly FIT test (stool test). These exams screen for colon cancer.      Have a cholesterol test every 5 years, or more often if advised.    Have a diabetes test (fasting glucose) every three years. If you are at risk for diabetes, you should have this test more often.     If you are at risk for osteoporosis (brittle bone disease), think about having a bone density scan (DEXA).    Shots: Get a flu shot each year. Get a tetanus shot every 10 years.    Nutrition:     Eat at least 5 servings of fruits and vegetables each day.    Eat whole-grain bread, whole-wheat pasta and brown rice  instead of white grains and rice.    Get adequate Calcium and Vitamin D.     Lifestyle    Exercise at least 150 minutes a week (30 minutes a day, 5 days a week). This will help you control your weight and prevent disease.    Limit alcohol to one drink per day.    No smoking.     Wear sunscreen to prevent skin cancer.     See your dentist every six months for an exam and cleaning.    See your eye doctor every 1 to 2 years.    Health Maintenance   Topic Date Due     PHQ-2 Q1 YR  09/28/1970     HIV SCREEN (SYSTEM ASSIGNED)  09/28/1976     HEPATITIS C SCREENING  09/28/1976     ADVANCE DIRECTIVE PLANNING Q5 YRS  09/28/2013     PAP SCREENING Q3 YR (SYSTEM ASSIGNED)  05/29/2018     LIPID SCREEN Q5 YR FEMALE (SYSTEM ASSIGNED)  05/29/2020     MAMMO SCREEN Q2 YR (SYSTEM ASSIGNED)  10/22/2020     COLON CANCER SCREEN (SYSTEM ASSIGNED)  03/26/2023     TETANUS IMMUNIZATION (SYSTEM ASSIGNED)  04/05/2026     INFLUENZA VACCINE  Completed       Please check with your insurance company to verify you have coverage benefits for the two stage shingles vaccination series (Shingrix). Shingles can be a very painful disease and the medications we have are generally not entirely effective at controlling shingles related pain. The vaccination is intended to prevent shingles and to help reduce the risk of having long-term pain if you do contract shingles.     Consider asking about a wait list at your pharmacy.           Follow-ups after your visit        Who to contact     If you have questions or need follow up information about today's clinic visit or your schedule please contact [facility] directly at [phone].  Normal or non-critical lab and imaging results will be communicated to you by MyChart, letter or phone within 4 business days after the clinic has received the results. If you do not hear from us within 7 days, please contact the clinic through MyChart or phone. If you have a critical or abnormal lab result, we will  "notify you by phone as soon as possible.  Submit refill requests through [employer] or call your pharmacy and they will forward the refill request to us. Please allow 3 business days for your refill to be completed.          Additional Information About Your Visit        [employer] Information     [employer] gives you secure access to your electronic health record. If you see a primary care provider, you can also send messages to your care team and make appointments. If you have questions, please call your primary care clinic.  If you do not have a primary care provider, please call [phone] and they will assist you.        Care EveryWhere ID     This is your Care EveryWhere ID. This could be used by other organizations to access your [place] medical records  [id number]        Your Vitals Were     Pulse Respirations Height Pulse Oximetry BMI (Body Mass Index)       70 16 1.638 m (5' 4.5\") 97% 26.8 kg/m2        Blood Pressure from Last 3 Encounters:   12/03/18 126/86   08/17/18 120/74   03/21/18 130/74    Weight from Last 3 Encounters:   12/03/18 71.9 kg (158 lb 9.6 oz)   08/17/18 70.8 kg (156 lb)   03/21/18 70.5 kg (155 lb 6.4 oz)              We Performed the Following     C-Reactive Protein  Quant ([facility])     CBC with Diff/Plt ([facility])     Comp. Metabolic Panel (14) ([facility])     Lipid Panel ([facility])     Pap IG rfx HPV ASCU ([facility])     Sed Rate Westergren ([facility])     TSH ([facility])        Primary Care Provider Office Phone # Fax #    Kajal Keesha Becker [phone] [address] [facility] [address]        Equal Access to Services     [facility] : Hadii alina Augustin, waaxda luqadaha, qaybta kaalmada marlene, estuardo packer. So [facility] [phone].    ATENCIÓN: Si habla español, tiene a coleman disposición servicios gratuitos de asistencia lingüística. Llame al [phone].    We comply with applicable federal civil rights laws and [place] laws. We " do not discriminate on the basis of race, color, national origin, age, disability, sex, sexual orientation, or gender identity.            Thank you!     Thank you for choosing [facility]  for your care. Our goal is always to provide you with excellent care. Hearing back from our patients is one way we can continue to improve our services. Please take a few minutes to complete the written survey that you may receive in the mail after your visit with us. Thank you!             Your Updated Medication List - Protect others around you: Learn how to safely use, store and throw away your medicines at www.disposemymeds.org.          This list is accurate as of 12/3/18 10:36 AM.  Always use your most recent med list.                   Brand Name Dispense Instructions for use Diagnosis    IBUPROFEN PO      Take 200-400 mg by mouth as needed for moderate pain Reported on 2/22/2017

## 2018-12-03 NOTE — LETTER
"[facility]  [address] [place]  [address]  Phone: [phone]    December 5, 2018      Leticia Tompkins  [address]  [facility] [address]              Dear Leticia,    Your hemoglobin A1c came back high. While still in the pre-diabetes range (under 6.5%), you are right on the edge of having diabetes. We usually start medication when the A1c reaches 7.0%. However, we may want to consider starting some oral medication now - it is quite possible the high blood sugar is contributing to your mood changes.     After we spoke on the phone this morning, I sent a prescription to your pharmacy for low dose Metformin. We'll start with just one pill with dinner. I ordered the extended release version as it tends to be better tolerated (less GI side effects) than the regular release version.     Please let me know how you feel in about a week.     We can start Wellbutrin for your mood if you are not feeling better once we get your blood sugars under better control.   I also ordered the abdominal ultrasound for you and will contact you again when I have those results.   I hope you are feeling better soon!         Sincerely,     Kajal \"Yusra\" MD Rosita/dmo      Results for orders placed or performed in visit on 12/03/18   TSH ([facility])   Result Value Ref Range    TSH 1.190 0.450 - 4.500 uIU/mL    Narrative    Performed at:  01 - [facility] [address], [place], [place]  [id number]  : Yoshi Moreno MD, Phone:  [phone]   CBC with Diff/Plt ([facility])   Result Value Ref Range    WBC x10/cmm 8.2 3.8 - 11.0 x10/cmm    % Lymphocytes 26.4 20.5 - 51.1 %    % Monocytes 7.3 1.7 - 9.3 %    % Granulocytes 66.3 42.2 - 75.2 %    RBC x10/cmm 4.66 3.7 - 5.2 x10/cmm    Hemoglobin 14.3 11.8 - 15.5 g/dl    Hematocrit 44.3 35 - 46 %    MCV 95.2 80 - 100 fL    MCH 30.8 27.0 - 31.0 pg    MCHC 32.3 (A) 33.0 - 37.0 g/dL    Platelet Count 448 140 - 450 K/uL   Comp. Metabolic Panel (14) ([facility])   Result Value " Ref Range    Glucose 124 (H) 65 - 99 mg/dL    Urea Nitrogen 12 8 - 27 mg/dL    Creatinine 0.68 0.57 - 1.00 mg/dL    eGFR If NonAfricn Am 95 >59 mL/min/1.73    eGFR If Africn Am 110 >59 mL/min/1.73    BUN/Creatinine Ratio 18 12 - 28    Sodium 142 134 - 144 mmol/L    Potassium 4.8 3.5 - 5.2 mmol/L    Chloride 101 96 - 106 mmol/L    Total CO2 28 20 - 29 mmol/L    Calcium 9.8 8.7 - 10.3 mg/dL    Protein Total 7.3 6.0 - 8.5 g/dL    Albumin 4.3 3.6 - 4.8 g/dL    Globulin, Total 3.0 1.5 - 4.5 g/dL    A/G Ratio 1.4 1.2 - 2.2    Bilirubin Total 0.3 0.0 - 1.2 mg/dL    Alkaline Phosphatase 58 39 - 117 IU/L    AST 16 0 - 40 IU/L    ALT 18 0 - 32 IU/L    Narrative    Performed at:  01 - [facility] [address], [place], [place]  [id number]  : Yoshi Moreno MD, Phone:  [phone]   Lipid Panel ([facility])   Result Value Ref Range    Cholesterol 247 (H) 100 - 199 mg/dL    Triglycerides 88 0 - 149 mg/dL    HDL Cholesterol 70 >39 mg/dL    VLDL Cholesterol Vitor 18 5 - 40 mg/dL    LDL Cholesterol Calculated 159 (H) 0 - 99 mg/dL    LDL/HDL Ratio 2.3 0.0 - 3.2 ratio    Narrative    Performed at:  01 - [facility] [address], [place], [place]  [id number]  : Yoshi Moreno MD, Phone:  [phone]   C-Reactive Protein  Quant ([facility])   Result Value Ref Range    C-Reactive Protein 10.8 (H) 0.0 - 4.9 mg/L    Narrative    Performed at:  01 - [facility] [address], [place], [place]  [id number]  : Yoshi Moreno MD, Phone:  [phone]   Sed Rate Westergren ([facility])   Result Value Ref Range    Sed Rate 40 0 - 40 mm/hr    Narrative    Performed at:  01 - [facility] [address], [place], [place]  [id number]  : Yoshi Moreno MD, Phone:  [phone]   Hemoglobin A1C ([facility])   Result Value Ref Range    Hemoglobin A1C 6.4 (H) 4.8 - 5.6 %    Narrative    Performed at:  01 - [facility] [address], [place], [place]  [id number]  : Yoshi Moreno MD, Phone:  [phone]

## 2018-12-03 NOTE — LETTER
"[facility]  [address] [place]  [address]  Phone: [phone]    December 6, 2018      Leticia Tompkins  [address]  [facility] [address]              Dear Leticia,    Praveen bryant - your Pap did not show any evidence of cervical cancer.   The current guidelines indicate repeating this every 3 years unless you develop any gynecological symptoms in the interim.   Thanks for your patience with this process.         Sincerely,     Kajal \"Yusra\" MD Roseline Becker, CMA    Results for orders placed or performed in visit on 12/03/18   TSH ([facility])   Result Value Ref Range    TSH 1.190 0.450 - 4.500 uIU/mL    Narrative    Performed at:  01 - [facility] [address], [place], [place]  [id number]  : Yoshi Moreno MD, Phone:  [phone]   CBC with Diff/Plt ([facility])   Result Value Ref Range    WBC x10/cmm 8.2 3.8 - 11.0 x10/cmm    % Lymphocytes 26.4 20.5 - 51.1 %    % Monocytes 7.3 1.7 - 9.3 %    % Granulocytes 66.3 42.2 - 75.2 %    RBC x10/cmm 4.66 3.7 - 5.2 x10/cmm    Hemoglobin 14.3 11.8 - 15.5 g/dl    Hematocrit 44.3 35 - 46 %    MCV 95.2 80 - 100 fL    MCH 30.8 27.0 - 31.0 pg    MCHC 32.3 (A) 33.0 - 37.0 g/dL    Platelet Count 448 140 - 450 K/uL   Comp. Metabolic Panel (14) ([facility])   Result Value Ref Range    Glucose 124 (H) 65 - 99 mg/dL    Urea Nitrogen 12 8 - 27 mg/dL    Creatinine 0.68 0.57 - 1.00 mg/dL    eGFR If NonAfricn Am 95 >59 mL/min/1.73    eGFR If Africn Am 110 >59 mL/min/1.73    BUN/Creatinine Ratio 18 12 - 28    Sodium 142 134 - 144 mmol/L    Potassium 4.8 3.5 - 5.2 mmol/L    Chloride 101 96 - 106 mmol/L    Total CO2 28 20 - 29 mmol/L    Calcium 9.8 8.7 - 10.3 mg/dL    Protein Total 7.3 6.0 - 8.5 g/dL    Albumin 4.3 3.6 - 4.8 g/dL    Globulin, Total 3.0 1.5 - 4.5 g/dL    A/G Ratio 1.4 1.2 - 2.2    Bilirubin Total 0.3 0.0 - 1.2 mg/dL    Alkaline Phosphatase 58 39 - 117 IU/L    AST 16 0 - 40 IU/L    ALT 18 0 - 32 IU/L    Narrative    Performed at:  01 - [facility] " [place] [address], [place], [place]  [id number]  : Yoshi Moreno MD, Phone:  [phone]   Lipid Panel ([facility])   Result Value Ref Range    Cholesterol 247 (H) 100 - 199 mg/dL    Triglycerides 88 0 - 149 mg/dL    HDL Cholesterol 70 >39 mg/dL    VLDL Cholesterol Vitor 18 5 - 40 mg/dL    LDL Cholesterol Calculated 159 (H) 0 - 99 mg/dL    LDL/HDL Ratio 2.3 0.0 - 3.2 ratio    Narrative    Performed at:  01 - [facility] [address], [place], [place]  [id number]  : Yoshi Moreno MD, Phone:  [phone]   C-Reactive Protein  Quant ([facility])   Result Value Ref Range    C-Reactive Protein 10.8 (H) 0.0 - 4.9 mg/L    Narrative    Performed at:  01 - [facility] [address], [place], [place]  [id number]  : Yoshi Moreno MD, Phone:  [phone]   Pap IG rfx HPV ASCU ([facility])   Result Value Ref Range    DIAGNOSIS: Comment     Specimen adequacy: Comment     Clinician Provided ICD10 Comment     Performed by: Comment     QC reviewed by: Comment     . .     Note: Comment     Test Methodology: Comment     . Comment     Narrative    Performed at:  01 - [address]  [id number]  : Lisa Levine MD, Phone:  [phone]  Specimen Comment: [id number]  Specimen Comment: Source.............Cervix  Specimen Comment: LMP / Prev Treat...Lynx / BX;Hyst Part  Specimen Comment: Dates / Results....LMP-NA  SUPRACERVICAL HYST 2013  Specimen Comment: No. of containers..01 ThinPrep Vial   Sed Rate Westergren ([facility])   Result Value Ref Range    Sed Rate 40 0 - 40 mm/hr    Narrative    Performed at:  01 - [facility] [address], [place], [place]  [id number]  : Yoshi Moreno MD, Phone:  [phone]   Hemoglobin A1C ([facility])   Result Value Ref Range    Hemoglobin A1C 6.4 (H) 4.8 - 5.6 %    Narrative    Performed at:  01 - [facility] [address], [place], [place]  [id number]  : Yoshi Moreno MD, Phone:   [phone]

## 2018-12-04 ENCOUNTER — TELEPHONE (OUTPATIENT)
Dept: FAMILY MEDICINE | Facility: CLINIC | Age: 58
End: 2018-12-04

## 2018-12-04 LAB
ALBUMIN SERPL-MCNC: 4.3 G/DL (ref 3.6–4.8)
ALBUMIN/GLOB SERPL: 1.4 {RATIO} (ref 1.2–2.2)
ALP SERPL-CCNC: 58 IU/L (ref 39–117)
ALT SERPL-CCNC: 18 IU/L (ref 0–32)
AST SERPL-CCNC: 16 IU/L (ref 0–40)
BILIRUB SERPL-MCNC: 0.3 MG/DL (ref 0–1.2)
BUN SERPL-MCNC: 12 MG/DL (ref 8–27)
BUN/CREATININE RATIO: 18 (ref 12–28)
CALCIUM SERPL-MCNC: 9.8 MG/DL (ref 8.7–10.3)
CHLORIDE SERPLBLD-SCNC: 101 MMOL/L (ref 96–106)
CHOLEST SERPL-MCNC: 247 MG/DL (ref 100–199)
CREAT SERPL-MCNC: 0.68 MG/DL (ref 0.57–1)
CRP SERPL-MCNC: 10.8 MG/L (ref 0–4.9)
EGFR IF AFRICN AM: 110 ML/MIN/1.73
EGFR IF NONAFRICN AM: 95 ML/MIN/1.73
ERYTHROCYTE [SEDIMENTATION RATE] IN BLOOD: 40 MM/HR (ref 0–40)
GLOBULIN, TOTAL: 3 G/DL (ref 1.5–4.5)
GLUCOSE SERPL-MCNC: 124 MG/DL (ref 65–99)
HDLC SERPL-MCNC: 70 MG/DL
LDL/HDL RATIO: 2.3 RATIO (ref 0–3.2)
LDLC SERPL CALC-MCNC: 159 MG/DL (ref 0–99)
POTASSIUM SERPL-SCNC: 4.8 MMOL/L (ref 3.5–5.2)
PROT SERPL-MCNC: 7.3 G/DL (ref 6–8.5)
SODIUM SERPL-SCNC: 142 MMOL/L (ref 134–144)
TOTAL CO2: 28 MMOL/L (ref 20–29)
TRIGL SERPL-MCNC: 88 MG/DL (ref 0–149)
TSH BLD-ACNC: 1.19 UIU/ML (ref 0.45–4.5)
VLDLC SERPL CALC-MCNC: 18 MG/DL (ref 5–40)

## 2018-12-04 ASSESSMENT — ANXIETY QUESTIONNAIRES: GAD7 TOTAL SCORE: 1

## 2018-12-04 NOTE — TELEPHONE ENCOUNTER
Called Leticia with lab results.   Recommended consult w/ Rheumatology.   Provided phone number to pt.   Encouraged her to check w/ insurance first.     Her brother  of progressive MS.   No other known autoimmune diseases in her family.

## 2018-12-05 ENCOUNTER — TELEPHONE (OUTPATIENT)
Dept: FAMILY MEDICINE | Facility: CLINIC | Age: 58
End: 2018-12-05

## 2018-12-05 DIAGNOSIS — R70.0 ELEVATED SED RATE: ICD-10-CM

## 2018-12-05 DIAGNOSIS — R79.82 ELEVATED C-REACTIVE PROTEIN (CRP): ICD-10-CM

## 2018-12-05 DIAGNOSIS — R73.03 PRE-DIABETES: Primary | ICD-10-CM

## 2018-12-05 LAB
.: NORMAL
.: NORMAL
CLINICIAN PROVIDED ICD10: NORMAL
DIAGNOSIS:: NORMAL
HBA1C MFR BLD: 6.4 % (ref 4.8–5.6)
Lab: NORMAL
Lab: NORMAL
PERFORMED BY:: NORMAL
SPECIMEN ADEQUACY:: NORMAL
TEST METHODOLOGY:: NORMAL

## 2018-12-05 RX ORDER — METFORMIN HCL 500 MG
500 TABLET, EXTENDED RELEASE 24 HR ORAL
Qty: 90 TABLET | Refills: 3 | Status: SHIPPED | OUTPATIENT
Start: 2018-12-05 | End: 2019-12-13

## 2018-12-06 ENCOUNTER — TRANSFERRED RECORDS (OUTPATIENT)
Dept: FAMILY MEDICINE | Facility: CLINIC | Age: 58
End: 2018-12-06

## 2018-12-13 DIAGNOSIS — F39 MOOD DISORDER (H): Primary | ICD-10-CM

## 2018-12-13 RX ORDER — BUPROPION HYDROCHLORIDE 150 MG/1
150 TABLET ORAL EVERY MORNING
Qty: 30 TABLET | Refills: 1 | Status: SHIPPED | OUTPATIENT
Start: 2018-12-13 | End: 2019-09-10

## 2019-01-17 ENCOUNTER — TRANSFERRED RECORDS (OUTPATIENT)
Dept: FAMILY MEDICINE | Facility: CLINIC | Age: 59
End: 2019-01-17

## 2019-09-04 ENCOUNTER — MYC REFILL (OUTPATIENT)
Dept: FAMILY MEDICINE | Facility: CLINIC | Age: 59
End: 2019-09-04

## 2019-09-04 DIAGNOSIS — F39 MOOD DISORDER (H): ICD-10-CM

## 2019-09-04 RX ORDER — BUPROPION HYDROCHLORIDE 150 MG/1
150 TABLET ORAL EVERY MORNING
Qty: 30 TABLET | Refills: 1 | OUTPATIENT
Start: 2019-09-04

## 2019-09-10 ENCOUNTER — OFFICE VISIT (OUTPATIENT)
Dept: FAMILY MEDICINE | Facility: CLINIC | Age: 59
End: 2019-09-10

## 2019-09-10 VITALS
HEART RATE: 84 BPM | DIASTOLIC BLOOD PRESSURE: 74 MMHG | SYSTOLIC BLOOD PRESSURE: 120 MMHG | WEIGHT: 165 LBS | BODY MASS INDEX: 27.88 KG/M2 | RESPIRATION RATE: 16 BRPM

## 2019-09-10 DIAGNOSIS — Z23 NEED FOR VACCINATION: ICD-10-CM

## 2019-09-10 DIAGNOSIS — F39 MOOD DISORDER (H): ICD-10-CM

## 2019-09-10 DIAGNOSIS — R70.0 ELEVATED SED RATE: ICD-10-CM

## 2019-09-10 DIAGNOSIS — R73.03 PRE-DIABETES: Primary | ICD-10-CM

## 2019-09-10 LAB
% GRANULOCYTES: 51.2 % (ref 42.2–75.2)
ERYTHROCYTE [SEDIMENTATION RATE] IN BLOOD: 3 MM/HR (ref 0–20)
HCT VFR BLD AUTO: 41.6 % (ref 35–46)
HEMOGLOBIN: 14 G/DL (ref 11.8–15.5)
LYMPHOCYTES NFR BLD AUTO: 38.7 % (ref 20.5–51.1)
MCH RBC QN AUTO: 31.6 PG (ref 27–31)
MCHC RBC AUTO-ENTMCNC: 33.6 G/DL (ref 33–37)
MCV RBC AUTO: 94 FL (ref 80–100)
MONOCYTES NFR BLD AUTO: 10.1 % (ref 1.7–9.3)
PLATELET # BLD AUTO: 356 K/UL (ref 140–450)
RBC # BLD AUTO: 4.42 X10/CMM (ref 3.7–5.2)
WBC # BLD AUTO: 6.5 X10/CMM (ref 3.8–11)

## 2019-09-10 PROCEDURE — 85651 RBC SED RATE NONAUTOMATED: CPT | Performed by: FAMILY MEDICINE

## 2019-09-10 PROCEDURE — 80053 COMPREHEN METABOLIC PANEL: CPT | Mod: 90 | Performed by: FAMILY MEDICINE

## 2019-09-10 PROCEDURE — 84443 ASSAY THYROID STIM HORMONE: CPT | Mod: 90 | Performed by: FAMILY MEDICINE

## 2019-09-10 PROCEDURE — 83036 HEMOGLOBIN GLYCOSYLATED A1C: CPT | Mod: 90 | Performed by: FAMILY MEDICINE

## 2019-09-10 PROCEDURE — 36415 COLL VENOUS BLD VENIPUNCTURE: CPT | Performed by: FAMILY MEDICINE

## 2019-09-10 PROCEDURE — 85025 COMPLETE CBC W/AUTO DIFF WBC: CPT | Performed by: FAMILY MEDICINE

## 2019-09-10 PROCEDURE — 99214 OFFICE O/P EST MOD 30 MIN: CPT | Performed by: FAMILY MEDICINE

## 2019-09-10 RX ORDER — CHOLECALCIFEROL (VITAMIN D3) 50 MCG
1 TABLET ORAL DAILY
COMMUNITY

## 2019-09-10 RX ORDER — BUPROPION HYDROCHLORIDE 150 MG/1
150 TABLET ORAL EVERY MORNING
Qty: 30 TABLET | Refills: 11 | Status: SHIPPED | OUTPATIENT
Start: 2019-09-10 | End: 2020-04-23

## 2019-09-10 ASSESSMENT — PATIENT HEALTH QUESTIONNAIRE - PHQ9: SUM OF ALL RESPONSES TO PHQ QUESTIONS 1-9: 4

## 2019-09-10 NOTE — PROGRESS NOTES
Problem(s) Oriented visit        SUBJECTIVE:                                                    Leticia Tompkins is a 58 year old female who presents to clinic today for the following health issues :    1. Mood disorder (H)  Depression:  Has known history of depression.  Has been on medication for this.  The patient does not report any significant side effects of this medication.  The prior symptoms leading to the original diagnosis and decision to start medication therapy are better.     Appetite is stable.  Sleeping patterns are stable.  No reported thoughts of suicide or homicide.    Last 3 PHQ9 results:  PHQ-9 SCORE 12/3/2018 9/10/2019   PHQ-9 Total Score 11 4       - buPROPion (WELLBUTRIN XL) 150 MG 24 hr tablet; Take 1 tablet (150 mg) by mouth every morning  Dispense: 30 tablet; Refill: 1     Previously elevated sed rate.    The patient has a history of impaired glucose tolerance with regularly elevated blood sugars.    They have not been diagnosed with type II DM but was  placed on medications for diabetes before.   They deny polyuria, polydipsia.     The patient is no obese with a BMI of Body mass index is 27.88 kg/m ..    Review of current labs show:    Lab Results   Component Value Date    A1C 6.4 12/04/2018    A1C 6.2 02/20/2018    A1C 5.8 04/05/2016    A1C 6.2 06/07/2011     @bgl@      Problem list, Medication list, Allergies, and Medical/Social/Surgical histories reviewed in EPIC and updated as appropriate.   Additional history: as documented    ROS:  General and Resp. completed and negative except as noted above    Histories:   Patient Active Problem List   Diagnosis     Lumbago     Carotene pigmentation of skin     CARDIOVASCULAR SCREENING; LDL GOAL LESS THAN 160     Health Care Home     Stress incontinence of urine     Cystocele, midline     Acquired hallux valgus of right foot     Diverticulosis of large intestine without hemorrhage     Pre-diabetes     Elevated C-reactive protein (CRP)     Elevated  sed rate     Past Surgical History:   Procedure Laterality Date     BLADDER SURGERY       C NONSPECIFIC PROCEDURE      Tubal ligation     C NONSPECIFIC PROCEDURE      Lasik eye surgery     C NONSPECIFIC PROCEDURE  3/07     L5-S1 discectomy     CYSTOCELE REPAIR N/A      HYSTERECTOMY N/A     Supracervical. See Path via Care Everywhere at [facility].      ORTHOPEDIC SURGERY Left 4-19-16    left foot bunionectomy     ORTHOPEDIC SURGERY      Foot surgery      RECTOCELE REPAIR N/A 2013       Social History     Tobacco Use     Smoking status: Former Smoker     Types: Cigarettes     Last attempt to quit: 3/12/1983     Years since quittin.5     Smokeless tobacco: Never Used   Substance Use Topics     Alcohol use: Yes     Alcohol/week: 0.0 - 1.5 oz     Comment: socially     Family History   Problem Relation Age of Onset     Diabetes Mother      Cardiovascular Mother         a fib     Lipids Mother      Melanoma Father 77     Diabetes Brother      Neuromuscular Disease Brother         severe progressive MS - dx'd            OBJECTIVE:                                                    /74   Pulse 84   Resp 16   Wt 74.8 kg (165 lb)   BMI 27.88 kg/m    Body mass index is 27.88 kg/m .   APPEARANCE: = Relaxed and in no distress  Conj/Eyelids = noninjected and lids and lashes are without inflammation  PERRLA/Irises = Pupils Round Reactive to Light and Irisis without inflammation  Neck = No anterior or posterior adenopathy appreciated.  Thyroid = Not enlarged and no masses felt  Resp effort = Calm regular breathing  Breath Sounds = Good air movement with no rales or rhonchi in any lung fields  Heart Rate, Rhythm, & sounds (no Murm)  = Regular rate and rhythm with no S3, S4, or murmur appreciated.  Carotid Art's = Pulses full and equal and no bruits appreciated  Abdomen = Soft, nontender, no masses, & bowel sounds in all quadrants  Liver/Spleen = Normal span and no splenomegaly noted  Digits and Nails  "= FROM in all finger joints, no nail dystrophy  Ext (edema) = No pretibial edema noted or elsewhere  Musculsktl =  Strength and ROM of major joints are within normal limits  SKIN = absent significant rashes or lesions   Recent/Remote Memory = Alert and Oriented x 3  Mood/Affect = Cooperative and interested     ASSESSMENT/PLAN:                                                        Leticia was seen today for recheck medication.    Diagnoses and all orders for this visit:    Pre-diabetes  Reviewed the labs showing elevated glucose levels.    Discussed \"pre-diabetes\", impaired glucose tolerance, and its part in the dysmetabolic syndrome.    Discussed the inevitable progression of impaired glucose tolerance toward worsening diabetes mellitus and the need for agressive interventions now to delay and prevent this inevitable progression.  Discussed the overall risks that dysmetabolic syndromes/impaired glucose tolerance/syndrome X pose toward increased risks of vascular disease as the main reason for agressive intervention now.  Will add medications for glucose control (i.e. metformin, glitazones, etc), lipid (e.g. statins), and for blood pressure (preferably ARBs and ACE) as indicated.  Will start these as early as needed based other proven ability to delay and modify these risk factors.   Discussed the need for aggressive diet control as the cornerstone of pre-diabetes and diabetes management, emphasizing the reduction of \"simple carbohydrates\" (e.g. Any kind of wheat products (e.g. any bread, any pasta), white rice, noodles, potatoes, snack foods, regular soda, juices (except fresh squeezed), cakes, cookies, candy, etc.) along with regular exercise.        -     Hemoglobin A1C ([facility])  -     Comp. Metabolic Panel (14) ([facility])  -     CBC with Diff/Plt ([facility])  -     TSH ([facility])    Mood disorder (H)  Doing well will contineu  -     buPROPion (WELLBUTRIN XL) 150 MG 24 hr tablet; Take 1 tablet (150 mg) by mouth every " morning    Elevated sed rate  -     Sed Rate Westergren ([facility])    Need for vaccination  -     INFLUENZA VACCINE IM 4YRS+ 4 VALENT CCIIV4  -     ADMIN 1st VACCINE        Work on weight loss  Regular exercise    The following health maintenance items are reviewed in Epic and correct as of today:  Health Maintenance   Topic Date Due     HEPATITIS C SCREENING  1960     ADVANCE CARE PLANNING  1960     HIV SCREENING  09/28/1973     ZOSTER IMMUNIZATION (1 of 2) 09/28/2008     PHQ-9  06/03/2019     INFLUENZA VACCINE (1) 09/01/2019     PREVENTIVE CARE VISIT  12/03/2019     MAMMO SCREENING  10/22/2020     PAP  12/03/2021     COLONOSCOPY  03/26/2023     LIPID  12/03/2023     DTAP/TDAP/TD IMMUNIZATION (3 - Td) 04/05/2026     IPV IMMUNIZATION  Aged Out     MENINGITIS IMMUNIZATION  Aged Out       Lc Michael MD  [facility]  Family Practice  [facility]  [phone]    For any issues my office # is [phone]

## 2019-09-11 LAB
ALBUMIN SERPL-MCNC: 4.4 G/DL (ref 3.6–4.8)
ALBUMIN/GLOB SERPL: 1.8 {RATIO} (ref 1.2–2.2)
ALP SERPL-CCNC: 63 IU/L (ref 39–117)
ALT SERPL-CCNC: 18 IU/L (ref 0–32)
AST SERPL-CCNC: 18 IU/L (ref 0–40)
BILIRUB SERPL-MCNC: 0.3 MG/DL (ref 0–1.2)
BUN SERPL-MCNC: 11 MG/DL (ref 8–27)
BUN/CREATININE RATIO: 17 (ref 12–28)
CALCIUM SERPL-MCNC: 9.4 MG/DL (ref 8.7–10.3)
CHLORIDE SERPLBLD-SCNC: 97 MMOL/L (ref 96–106)
CREAT SERPL-MCNC: 0.63 MG/DL (ref 0.57–1)
EGFR IF AFRICN AM: 113 ML/MIN/1.73
EGFR IF NONAFRICN AM: 98 ML/MIN/1.73
GLOBULIN, TOTAL: 2.5 G/DL (ref 1.5–4.5)
GLUCOSE SERPL-MCNC: 172 MG/DL (ref 65–99)
HBA1C MFR BLD: 6.5 % (ref 4.8–5.6)
POTASSIUM SERPL-SCNC: 4.3 MMOL/L (ref 3.5–5.2)
PROT SERPL-MCNC: 6.9 G/DL (ref 6–8.5)
SODIUM SERPL-SCNC: 138 MMOL/L (ref 134–144)
TOTAL CO2: 27 MMOL/L (ref 20–29)
TSH BLD-ACNC: 0.82 UIU/ML (ref 0.45–4.5)

## 2019-09-12 NOTE — RESULT ENCOUNTER NOTE
Dear Leticia,   I am writing to report that your included test results are within expected ranges. I do not suggest that we make any changes at this time.  Lc Michael M.D.

## 2019-10-02 ENCOUNTER — HEALTH MAINTENANCE LETTER (OUTPATIENT)
Age: 59
End: 2019-10-02

## 2019-10-23 ENCOUNTER — HOSPITAL ENCOUNTER (OUTPATIENT)
Dept: MAMMOGRAPHY | Facility: CLINIC | Age: 59
Discharge: HOME | End: 2019-10-23
Attending: FAMILY MEDICINE | Admitting: FAMILY MEDICINE
Payer: COMMERCIAL

## 2019-10-23 DIAGNOSIS — Z12.31 VISIT FOR SCREENING MAMMOGRAM: ICD-10-CM

## 2019-10-23 PROCEDURE — 77063 BREAST TOMOSYNTHESIS BI: CPT

## 2019-12-13 DIAGNOSIS — R73.03 PRE-DIABETES: ICD-10-CM

## 2019-12-13 RX ORDER — METFORMIN HCL 500 MG
500 TABLET, EXTENDED RELEASE 24 HR ORAL
Qty: 90 TABLET | Refills: 3 | Status: SHIPPED | OUTPATIENT
Start: 2019-12-13 | End: 2020-02-28

## 2020-01-10 ENCOUNTER — OFFICE VISIT (OUTPATIENT)
Dept: FAMILY MEDICINE | Facility: CLINIC | Age: 60
End: 2020-01-10

## 2020-01-10 VITALS
BODY MASS INDEX: 28.56 KG/M2 | SYSTOLIC BLOOD PRESSURE: 122 MMHG | DIASTOLIC BLOOD PRESSURE: 82 MMHG | RESPIRATION RATE: 16 BRPM | OXYGEN SATURATION: 96 % | HEART RATE: 101 BPM | WEIGHT: 169 LBS

## 2020-01-10 DIAGNOSIS — E11.69 TYPE 2 DIABETES MELLITUS WITH OTHER SPECIFIED COMPLICATION, WITHOUT LONG-TERM CURRENT USE OF INSULIN (H): ICD-10-CM

## 2020-01-10 DIAGNOSIS — R55 SYNCOPE, UNSPECIFIED SYNCOPE TYPE: ICD-10-CM

## 2020-01-10 DIAGNOSIS — F41.9 ANXIETY: Primary | ICD-10-CM

## 2020-01-10 DIAGNOSIS — R00.0 TACHYCARDIA: ICD-10-CM

## 2020-01-10 PROBLEM — E11.9 DIABETES MELLITUS, TYPE 2 (H): Status: ACTIVE | Noted: 2020-01-10

## 2020-01-10 LAB
% GRANULOCYTES: 59.9 % (ref 42.2–75.2)
HCT VFR BLD AUTO: 47.2 % (ref 35–46)
HEMOGLOBIN: 15.1 G/DL (ref 11.8–15.5)
LYMPHOCYTES NFR BLD AUTO: 31.8 % (ref 20.5–51.1)
MCH RBC QN AUTO: 31.1 PG (ref 27–31)
MCHC RBC AUTO-ENTMCNC: 32 G/DL (ref 33–37)
MCV RBC AUTO: 97.1 FL (ref 80–100)
MONOCYTES NFR BLD AUTO: 8.3 % (ref 1.7–9.3)
PLATELET # BLD AUTO: 354 K/UL (ref 140–450)
RBC # BLD AUTO: 4.86 X10/CMM (ref 3.7–5.2)
WBC # BLD AUTO: 8.7 X10/CMM (ref 3.8–11)

## 2020-01-10 PROCEDURE — 84436 ASSAY OF TOTAL THYROXINE: CPT | Mod: 90 | Performed by: NURSE PRACTITIONER

## 2020-01-10 PROCEDURE — 84443 ASSAY THYROID STIM HORMONE: CPT | Mod: 90 | Performed by: NURSE PRACTITIONER

## 2020-01-10 PROCEDURE — 93000 ELECTROCARDIOGRAM COMPLETE: CPT | Mod: 59 | Performed by: NURSE PRACTITIONER

## 2020-01-10 PROCEDURE — 84479 ASSAY OF THYROID (T3 OR T4): CPT | Mod: 90 | Performed by: NURSE PRACTITIONER

## 2020-01-10 PROCEDURE — 36415 COLL VENOUS BLD VENIPUNCTURE: CPT | Performed by: NURSE PRACTITIONER

## 2020-01-10 PROCEDURE — 80053 COMPREHEN METABOLIC PANEL: CPT | Mod: 90 | Performed by: NURSE PRACTITIONER

## 2020-01-10 PROCEDURE — 99214 OFFICE O/P EST MOD 30 MIN: CPT | Mod: 25 | Performed by: NURSE PRACTITIONER

## 2020-01-10 PROCEDURE — 85025 COMPLETE CBC W/AUTO DIFF WBC: CPT | Performed by: NURSE PRACTITIONER

## 2020-01-10 RX ORDER — ALPRAZOLAM 0.5 MG
0.5 TABLET ORAL
Qty: 10 TABLET | Refills: 0 | Status: SHIPPED | OUTPATIENT
Start: 2020-01-10 | End: 2020-04-23

## 2020-01-10 RX ORDER — PROPRANOLOL HYDROCHLORIDE 40 MG/1
40 TABLET ORAL 3 TIMES DAILY
Qty: 90 TABLET | Refills: 0 | Status: SHIPPED | OUTPATIENT
Start: 2020-01-10 | End: 2020-01-23

## 2020-01-10 NOTE — PROGRESS NOTES
Problem(s) Oriented visit        SUBJECTIVE:                                                    Leticia Tompkins is a 59 year old female who presents to clinic today for the following health issues :  Passed out when running errands at [employer]. Unsure if she lost consciousness, did not hit the ground.  Some increase in stress, mother just passed away. Has been eating chips and salsa, chicken tubbs and white rice. Unusually rich food for her normal diet. Stopped taking metformin about one week ago.     Leticia reports her mother passed away one week ago. She has been very busy since then.     Leticia had an episode of atrial fibrillation over ten years ago with no other episodes since. She reports this feels similar.          Problem list, Medication list, Allergies, and Medical/Social/Surgical histories reviewed in [facility] and updated as appropriate.   Additional history: as documented    ROS:  General:  NEGATIVE for fever, chills, change in weight HEENT:  No changes in hearing or vision, no nose bleeds or other nasal problems and Negative for frequent or significant headaches CV:  POSITIVE for syncope or near-syncope and tachycardia Resp:  NEGATIVE for significant cough or SOB GI: negative  (female):  NEGATIVE for frequency, dysuria, or hematuria Musculoskeletal:  No significant muscle or joint pains  Neurologic: No headaches, numbness, tingling, or weakness Psychiatric: POSITIVE for:, stress, depression Heme/immune/allergy: No history of bleeding or clotting problems or anemia. Endocrine: POSITIVE for:, diabetes mellitus Skin: No rashes,worrisome lesions or skin problems    Histories:   Patient Active Problem List   Diagnosis     Lumbago     Carotene pigmentation of skin     CARDIOVASCULAR SCREENING; LDL GOAL LESS THAN 160     Health Care Home     Stress incontinence of urine     Cystocele, midline     Acquired hallux valgus of right foot     Diverticulosis of large intestine without hemorrhage     Pre-diabetes     Elevated  C-reactive protein (CRP)     Elevated sed rate     Diabetes mellitus, type 2 (H)     Past Surgical History:   Procedure Laterality Date     BLADDER SURGERY       C NONSPECIFIC PROCEDURE      Tubal ligation     C NONSPECIFIC PROCEDURE      Lasik eye surgery     C NONSPECIFIC PROCEDURE  3/07     L5-S1 discectomy     CYSTOCELE REPAIR N/A      HYSTERECTOMY N/A     Supracervical. See Path via Care Everywhere at [facility].      ORTHOPEDIC SURGERY Left 16    left foot bunionectomy     ORTHOPEDIC SURGERY      Foot surgery      RECTOCELE REPAIR N/A 2013       Social History     Tobacco Use     Smoking status: Former Smoker     Types: Cigarettes     Last attempt to quit: 3/12/1983     Years since quittin.8     Smokeless tobacco: Never Used   Substance Use Topics     Alcohol use: Yes     Alcohol/week: 0.0 - 2.5 standard drinks     Comment: socially     Family History   Problem Relation Age of Onset     Diabetes Mother      Cardiovascular Mother         a fib     Lipids Mother      Melanoma Father 77     Diabetes Brother      Neuromuscular Disease Brother         severe progressive MS - dx'd            OBJECTIVE:                                                    /82   Pulse 101   Resp 16   Wt 76.7 kg (169 lb)   SpO2 96%   BMI 28.56 kg/m    Body mass index is 28.56 kg/m .   GENERAL: healthy, alert and no distress  EYES: Eyes grossly normal to inspection, PERRL and conjunctivae and sclerae normal  RESP: lungs clear to auscultation - no rales, rhonchi or wheezes  CV: regular rate and rhythm, normal S1 S2, no S3 or S4, no murmur, click or rub, no peripheral edema and peripheral pulses strong  MS: no gross musculoskeletal defects noted, no edema  NEURO: Normal strength and tone, mentation intact and speech normal  PSYCH: mentation appears normal, tearful and fatigued     ASSESSMENT/PLAN:                                                        Leticia was seen today for syncope.    Diagnoses and  all orders for this visit:    Anxiety  -     propranolol (INDERAL) 40 MG tablet; Take 1 tablet (40 mg) by mouth 3 times daily  -     ALPRAZolam (XANAX) 0.5 MG tablet; Take 1 tablet (0.5 mg) by mouth nightly as needed for anxiety  I would like you to try the xanax 0.5 mg if you are feeling overwhelmed by anxiety or if you feel lightheaded and feel a flutter in your chest. If this does not resolve the issue please use the propranolol TID.     Tachycardia  -     CBC with Diff/Plt ([facility])  -     Comp. Metabolic Panel (14) ([facility])  -     EKG 12-lead complete w/read - Clinics  -     Thyroid Panel With TSH ([facility])  -     CARDIOLOGY EVAL ADULT REFERRAL  Please follow up with cardiology if you continue to have palpations, lightheadedness or fainting.    Tachycardia resolved while in the office, no evidence of A-Fib on ECG.     Syncope, unspecified syncope type  -     CBC with Diff/Plt ([facility])  -     EKG 12-lead complete w/read - Clinics  -     Thyroid Panel With TSH ([facility])      >25 min spent with patient, greater than 50% spent on discussion/education/planning, etc. About The primary encounter diagnosis was Anxiety. Diagnoses of Tachycardia, Syncope, unspecified syncope type, and Type 2 diabetes mellitus with other specified complication, without long-term current use of insulin (H) were also pertinent to this visit.        ASSESSMENT/PLAN:       The following health maintenance items are reviewed in Epic and correct as of today:  Health Maintenance   Topic Date Due     MICROALBUMIN  1960     DIABETIC FOOT EXAM  1960     HEPATITIS C SCREENING  1960     ADVANCE CARE PLANNING  1960     EYE EXAM  1960     HIV SCREENING  09/28/1973     PNEUMOCOCCAL IMMUNIZATION 19-64 MEDIUM RISK (1 of 1 - PPSV23) 09/28/1977     HPV  09/28/1979     ZOSTER IMMUNIZATION (1 of 2) 09/28/2008     PREVENTIVE CARE VISIT  12/03/2019     LIPID  12/03/2019     A1C  12/10/2019     PHQ-2  01/01/2020     BMP  09/10/2020      TSH W/FREE T4 REFLEX  09/10/2021     MAMMO SCREENING  10/23/2021     COLONOSCOPY  03/26/2023     PAP  12/03/2023     DTAP/TDAP/TD IMMUNIZATION (3 - Td) 04/05/2026     INFLUENZA VACCINE  Completed     IPV IMMUNIZATION  Aged Out     MENINGITIS IMMUNIZATION  Aged Out       Cary Moran NP  [facility]  Family Practice  [facility]  [phone]    For any issues my office # is [phone]

## 2020-01-11 LAB
ALBUMIN SERPL-MCNC: 4.7 G/DL (ref 3.6–4.8)
ALBUMIN/GLOB SERPL: 1.7 {RATIO} (ref 1.2–2.2)
ALP SERPL-CCNC: 64 IU/L (ref 39–117)
ALT SERPL-CCNC: 26 IU/L (ref 0–32)
AST SERPL-CCNC: 23 IU/L (ref 0–40)
BILIRUB SERPL-MCNC: <0.2 MG/DL (ref 0–1.2)
BUN SERPL-MCNC: 15 MG/DL (ref 8–27)
BUN/CREATININE RATIO: 20 (ref 12–28)
CALCIUM SERPL-MCNC: 9.9 MG/DL (ref 8.7–10.3)
CHLORIDE SERPLBLD-SCNC: 101 MMOL/L (ref 96–106)
CREAT SERPL-MCNC: 0.75 MG/DL (ref 0.57–1)
EGFR IF AFRICN AM: 99 ML/MIN/1.73
EGFR IF NONAFRICN AM: 86 ML/MIN/1.73
FT4I SERPL CALC-MCNC: 1.7 (ref 1.2–4.9)
GLOBULIN, TOTAL: 2.8 G/DL (ref 1.5–4.5)
GLUCOSE SERPL-MCNC: 256 MG/DL (ref 65–99)
POTASSIUM SERPL-SCNC: 5 MMOL/L (ref 3.5–5.2)
PROT SERPL-MCNC: 7.5 G/DL (ref 6–8.5)
SODIUM SERPL-SCNC: 143 MMOL/L (ref 134–144)
T3RU NFR SERPL: 26 % (ref 24–39)
T4 TOTAL: 6.7 UG/DL (ref 4.5–12)
TOTAL CO2: 26 MMOL/L (ref 20–29)
TSH BLD-ACNC: 1.67 UIU/ML (ref 0.45–4.5)

## 2020-01-18 NOTE — RESULT ENCOUNTER NOTE
Dear Leticia,     I am covering for Cary Moran. Your included test results are within normal ranges. I do not suggest that we make any changes at this time.    Lc Michael M.D.

## 2020-01-22 ENCOUNTER — TELEPHONE (OUTPATIENT)
Dept: CARDIOLOGY | Facility: CLINIC | Age: 60
End: 2020-01-22

## 2020-01-22 NOTE — TELEPHONE ENCOUNTER
"01/22/20 Called pt as she was scheduled for a new pt appt for tomorrow with Dr Negron. Pt had syncopal episode while shopping. She did not go to ED  but saw PCP who ordered EP follow up. Pt states she has hx of Afib, was seen in ED and underwent DCCV x 1 approx 20 years ago. Her mother passed away about 3 weeks ago so pt under significant stress. Has been having daily episodes of \"feeling weird\" like she is going to faint again but has not. Will check w Dr Negron about a monitor or other tests prior to OV and call pt back. Pt voiced understanding. Amrik 1222 pm  "

## 2020-01-23 ENCOUNTER — OFFICE VISIT (OUTPATIENT)
Dept: CARDIOLOGY | Facility: CLINIC | Age: 60
End: 2020-01-23
Attending: NURSE PRACTITIONER
Payer: COMMERCIAL

## 2020-01-23 VITALS
HEART RATE: 78 BPM | BODY MASS INDEX: 29.41 KG/M2 | DIASTOLIC BLOOD PRESSURE: 85 MMHG | OXYGEN SATURATION: 94 % | SYSTOLIC BLOOD PRESSURE: 125 MMHG | WEIGHT: 174 LBS

## 2020-01-23 DIAGNOSIS — R55 SYNCOPE, UNSPECIFIED SYNCOPE TYPE: Primary | ICD-10-CM

## 2020-01-23 DIAGNOSIS — R00.0 TACHYCARDIA: ICD-10-CM

## 2020-01-23 PROCEDURE — 99204 OFFICE O/P NEW MOD 45 MIN: CPT | Performed by: INTERNAL MEDICINE

## 2020-01-23 PROCEDURE — 93000 ELECTROCARDIOGRAM COMPLETE: CPT | Performed by: INTERNAL MEDICINE

## 2020-01-23 NOTE — PROGRESS NOTES
HPI and Plan:   See dictation    Orders Placed This Encounter   Procedures     EKG 12-lead complete w/read - Clinics (performed today)     Zio Patch Holter Adult Pediatric Greater than 48 hrs     Echocardiogram Complete       No orders of the defined types were placed in this encounter.      Medications Discontinued During This Encounter   Medication Reason     propranolol (INDERAL) 40 MG tablet          Encounter Diagnosis   Name Primary?     Tachycardia Yes       CURRENT MEDICATIONS:  Current Outpatient Medications   Medication Sig Dispense Refill     buPROPion (WELLBUTRIN XL) 150 MG 24 hr tablet Take 1 tablet (150 mg) by mouth every morning 30 tablet 11     IBUPROFEN PO Take 200-400 mg by mouth as needed for moderate pain Reported on 2/22/2017       metFORMIN (GLUCOPHAGE-XR) 500 MG 24 hr tablet Take 1 tablet (500 mg) by mouth daily (with dinner) (Patient taking differently: Take 1,000 mg by mouth daily (with dinner) ) 90 tablet 3     vitamin D3 (CHOLECALCIFEROL) 2000 units (50 mcg) tablet Take 1 tablet by mouth daily       ALPRAZolam (XANAX) 0.5 MG tablet Take 1 tablet (0.5 mg) by mouth nightly as needed for anxiety (Patient not taking: Reported on 1/23/2020) 10 tablet 0       ALLERGIES   No Known Allergies    PAST MEDICAL HISTORY:  Past Medical History:   Diagnosis Date     Depressive disorder, not elsewhere classified 1997    treated with Zoloft for 8 months     Lumbago     L5-S1 radiculopathy     Spider veins      Tachycardia, unspecified 2004    Single episode treated with Adenosine       PAST SURGICAL HISTORY:  Past Surgical History:   Procedure Laterality Date     BLADDER SURGERY       C NONSPECIFIC PROCEDURE  1991    Tubal ligation     C NONSPECIFIC PROCEDURE      Lasik eye surgery     C NONSPECIFIC PROCEDURE  3/07     L5-S1 discectomy     CYSTOCELE REPAIR N/A 0/2013     HYSTERECTOMY N/A 05/23/20014    Supracervical. [place] [facility] [place]      [place]THOPEDIC SURGERY Left 4-19-16    left foot  bunionectomy     ORTHOPEDIC SURGERY      Foot surgery      RECTOCELE REPAIR N/A 2013       FAMILY HISTORY:  Family History   Problem Relation Age of Onset     Diabetes Mother      Cardiovascular Mother         a fib     Lipids Mother      Melanoma Father 77     Diabetes Brother      Neuromuscular Disease Brother         severe progressive MS - dx'd        SOCIAL HISTORY:  Social History     Socioeconomic History     Marital status:      Spouse name: None     Number of children: None     Years of education: None     Highest education level: None   Occupational History     None   Social Needs     Financial resource strain: None     Food insecurity:     Worry: None     Inability: None     Transportation needs:     Medical: None     Non-medical: None   Tobacco Use     Smoking status: Former Smoker     Types: Cigarettes     Last attempt to quit: 3/12/1983     Years since quittin.8     Smokeless tobacco: Never Used   Substance and Sexual Activity     Alcohol use: Yes     Alcohol/week: 0.0 - 2.5 standard drinks     Comment: socially     Drug use: No     Sexual activity: Yes     Partners: Male   Lifestyle     Physical activity:     Days per week: None     Minutes per session: None     Stress: None   Relationships     Social connections:     Talks on phone: None     Gets together: None     Attends Adventism service: None     Active member of club or organization: None     Attends meetings of clubs or organizations: None     Relationship status: None     Intimate partner violence:     Fear of current or ex partner: None     Emotionally abused: None     Physically abused: None     Forced sexual activity: None   Other Topics Concern     Parent/sibling w/ CABG, MI or angioplasty before 65F 55M? Not Asked   Social History Narrative     None       Review of Systems:  Skin:  Negative for       Eyes:  Positive for glasses    ENT:  Negative      Respiratory:  Negative       Cardiovascular:    syncope or  near-syncope;Positive for;lightheadedness;dizziness;fatigue    Gastroenterology: Positive for   heavy appetite   Genitourinary:  not assessed      Musculoskeletal:  Positive for arthritis    Neurologic:  Positive for headaches    Psychiatric:  Positive for anxiety    Heme/Lymph/Imm:  Negative      Endocrine:  Positive for diabetes      Physical Exam:  Vitals: /85   Pulse 78   Wt 78.9 kg (174 lb)   SpO2 94%   BMI 29.41 kg/m      Constitutional:           Skin:             Head:           Eyes:           Lymph:      ENT:           Neck:           Respiratory:            Cardiac:                                                           GI:           Extremities and Muscular Skeletal:                 Neurological:           Psych:           CC  Cary Moran NP  [address] [place] [address]            HPI and Plan:   See dictation    Orders Placed This Encounter   Procedures     EKG 12-lead complete w/read - Clinics (performed today)     Zio Patch Holter Adult Pediatric Greater than 48 hrs     Echocardiogram Complete       No orders of the defined types were placed in this encounter.      Medications Discontinued During This Encounter   Medication Reason     propranolol (INDERAL) 40 MG tablet          Encounter Diagnosis   Name Primary?     Tachycardia Yes       CURRENT MEDICATIONS:  Current Outpatient Medications   Medication Sig Dispense Refill     buPROPion (WELLBUTRIN XL) 150 MG 24 hr tablet Take 1 tablet (150 mg) by mouth every morning 30 tablet 11     IBUPROFEN PO Take 200-400 mg by mouth as needed for moderate pain Reported on 2/22/2017       metFORMIN (GLUCOPHAGE-XR) 500 MG 24 hr tablet Take 1 tablet (500 mg) by mouth daily (with dinner) (Patient taking differently: Take 1,000 mg by mouth daily (with dinner) ) 90 tablet 3     vitamin D3 (CHOLECALCIFEROL) 2000 units (50 mcg) tablet Take 1 tablet by mouth daily       ALPRAZolam (XANAX) 0.5 MG tablet Take 1 tablet (0.5 mg) by mouth nightly  as needed for anxiety (Patient not taking: Reported on 2020) 10 tablet 0       ALLERGIES   No Known Allergies    PAST MEDICAL HISTORY:  Past Medical History:   Diagnosis Date     Depressive disorder, not elsewhere classified     treated with Zoloft for 8 months     Lumbago     L5-S1 radiculopathy     Spider veins      Tachycardia, unspecified     Single episode treated with Adenosine       PAST SURGICAL HISTORY:  Past Surgical History:   Procedure Laterality Date     BLADDER SURGERY       C NONSPECIFIC PROCEDURE      Tubal ligation     C NONSPECIFIC PROCEDURE      Lasik eye surgery     C NONSPECIFIC PROCEDURE  3/07     L5-S1 discectomy     CYSTOCELE REPAIR N/A      HYSTERECTOMY N/A     [place] [facility] [place]      ORTHOPEDIC SURGERY Left 16    left foot bunionectomy     ORTHOPEDIC SURGERY      Foot surgery      RECTOCELE REPAIR N/A 2013       FAMILY HISTORY:  Family History   Problem Relation Age of Onset     Diabetes Mother      Cardiovascular Mother         a fib     Lipids Mother      Melanoma Father 77     Diabetes Brother      Neuromuscular Disease Brother         severe progressive MS - dx'd        SOCIAL HISTORY:  Social History     Socioeconomic History     Marital status:      Spouse name: None     Number of children: None     Years of education: None     Highest education level: None   Occupational History     None   Social Needs     Financial resource strain: None     Food insecurity:     Worry: None     Inability: None     Transportation needs:     Medical: None     Non-medical: None   Tobacco Use     Smoking status: Former Smoker     Types: Cigarettes     Last attempt to quit: 3/12/1983     Years since quittin.8     Smokeless tobacco: Never Used   Substance and Sexual Activity     Alcohol use: Yes     Alcohol/week: 0.0 - 2.5 standard drinks     Comment: socially     Drug use: No     Sexual activity: Yes     Partners: Male    Lifestyle     Physical activity:     Days per week: None     Minutes per session: None     Stress: None   Relationships     Social connections:     Talks on phone: None     Gets together: None     Attends Sabianist service: None     Active member of club or organization: None     Attends meetings of clubs or organizations: None     Relationship status: None     Intimate partner violence:     Fear of current or ex partner: None     Emotionally abused: None     Physically abused: None     Forced sexual activity: None   Other Topics Concern     Parent/sibling w/ CABG, MI or angioplasty before 65F 55M? Not Asked   Social History Narrative     None       Review of Systems:  Skin:  Negative for       Eyes:  Positive for glasses    ENT:  Negative      Respiratory:  Negative       Cardiovascular:    syncope or near-syncope;Positive for;lightheadedness;dizziness;fatigue    Gastroenterology: Positive for   heavy appetite   Genitourinary:  not assessed      Musculoskeletal:  Positive for arthritis    Neurologic:  Positive for headaches    Psychiatric:  Positive for anxiety    Heme/Lymph/Imm:  Negative      Endocrine:  Positive for diabetes      [id number]

## 2020-01-23 NOTE — LETTER
1/23/2020    Lc Michael MD  [address]  [facility] [address]    RE: Leticia Tompkins       Dear Colleague,    I had the pleasure of seeing Leticia Tompkins in the [facility].    HPI and Plan:   See dictation    Orders Placed This Encounter   Procedures     EKG 12-lead complete w/read - Clinics (performed today)     Zio Patch Holter Adult Pediatric Greater than 48 hrs     Echocardiogram Complete       No orders of the defined types were placed in this encounter.      Medications Discontinued During This Encounter   Medication Reason     propranolol (INDERAL) 40 MG tablet          Encounter Diagnosis   Name Primary?     Tachycardia Yes       CURRENT MEDICATIONS:  Current Outpatient Medications   Medication Sig Dispense Refill     buPROPion (WELLBUTRIN XL) 150 MG 24 hr tablet Take 1 tablet (150 mg) by mouth every morning 30 tablet 11     IBUPROFEN PO Take 200-400 mg by mouth as needed for moderate pain Reported on 2/22/2017       metFORMIN (GLUCOPHAGE-XR) 500 MG 24 hr tablet Take 1 tablet (500 mg) by mouth daily (with dinner) (Patient taking differently: Take 1,000 mg by mouth daily (with dinner) ) 90 tablet 3     vitamin D3 (CHOLECALCIFEROL) 2000 units (50 mcg) tablet Take 1 tablet by mouth daily       ALPRAZolam (XANAX) 0.5 MG tablet Take 1 tablet (0.5 mg) by mouth nightly as needed for anxiety (Patient not taking: Reported on 1/23/2020) 10 tablet 0       ALLERGIES   No Known Allergies    PAST MEDICAL HISTORY:  Past Medical History:   Diagnosis Date     Depressive disorder, not elsewhere classified 1997    treated with Zoloft for 8 months     Lumbago     L5-S1 radiculopathy     Spider veins      Tachycardia, unspecified 2004    Single episode treated with Adenosine       PAST SURGICAL HISTORY:  Past Surgical History:   Procedure Laterality Date     BLADDER SURGERY       C NONSPECIFIC PROCEDURE  1991    Tubal ligation     C NONSPECIFIC PROCEDURE      Lasik eye surgery     C  NONSPECIFIC PROCEDURE  3/07     L5-S1 discectomy     CYSTOCELE REPAIR N/A      HYSTERECTOMY N/A     Supracervical. [place] Path [place] [facility] [place]      ORTHOPEDIC SURGERY Left -16    left foot bunionectomy     ORTHOPEDIC SURGERY      Foot surgery      RECTOCELE REPAIR N/A 2013       FAMILY HISTORY:  Family History   Problem Relation Age of Onset     Diabetes Mother      Cardiovascular Mother         a fib     Lipids Mother      Melanoma Father 77     Diabetes Brother      Neuromuscular Disease Brother         severe progressive MS - dx'd        SOCIAL HISTORY:  Social History     Socioeconomic History     Marital status:      Spouse name: None     Number of children: None     Years of education: None     Highest education level: None   Occupational History     None   Social Needs     Financial resource strain: None     Food insecurity:     Worry: None     Inability: None     Transportation needs:     Medical: None     Non-medical: None   Tobacco Use     Smoking status: Former Smoker     Types: Cigarettes     Last attempt to quit: 3/12/1983     Years since quittin.8     Smokeless tobacco: Never Used   Substance and Sexual Activity     Alcohol use: Yes     Alcohol/week: 0.0 - 2.5 standard drinks     Comment: socially     Drug use: No     Sexual activity: Yes     Partners: Male   Lifestyle     Physical activity:     Days per week: None     Minutes per session: None     Stress: None   Relationships     Social connections:     Talks on phone: None     Gets together: None     Attends Scientology service: None     Active member of club or organization: None     Attends meetings of clubs or organizations: None     Relationship status: None     Intimate partner violence:     Fear of current or ex partner: None     Emotionally abused: None     Physically abused: None     Forced sexual activity: None   Other Topics Concern     Parent/sibling w/ CABG, MI or angioplasty before 65F 55M? Not  Asked   Social History Narrative     None       Review of Systems:  Skin:  Negative for       Eyes:  Positive for glasses    ENT:  Negative      Respiratory:  Negative       Cardiovascular:    syncope or near-syncope;Positive for;lightheadedness;dizziness;fatigue    Gastroenterology: Positive for   heavy appetite   Genitourinary:  not assessed      Musculoskeletal:  Positive for arthritis    Neurologic:  Positive for headaches    Psychiatric:  Positive for anxiety    Heme/Lymph/Imm:  Negative      Endocrine:  Positive for diabetes      Physical Exam:  Vitals: /85   Pulse 78   Wt 78.9 kg (174 lb)   SpO2 94%   BMI 29.41 kg/m       Constitutional:           Skin:             Head:           Eyes:           Lymph:      ENT:           Neck:           Respiratory:            Cardiac:                                                           GI:           Extremities and Muscular Skeletal:                 Neurological:           Psych:           CC  Cary Moran NP  [address] [place] [address]            HPI and Plan:   See dictation    Orders Placed This Encounter   Procedures     EKG 12-lead complete w/read - Clinics (performed today)     Zio Patch Holter Adult Pediatric Greater than 48 hrs     Echocardiogram Complete       No orders of the defined types were placed in this encounter.      Medications Discontinued During This Encounter   Medication Reason     propranolol (INDERAL) 40 MG tablet          Encounter Diagnosis   Name Primary?     Tachycardia Yes       CURRENT MEDICATIONS:  Current Outpatient Medications   Medication Sig Dispense Refill     buPROPion (WELLBUTRIN XL) 150 MG 24 hr tablet Take 1 tablet (150 mg) by mouth every morning 30 tablet 11     IBUPROFEN PO Take 200-400 mg by mouth as needed for moderate pain Reported on 2/22/2017       metFORMIN (GLUCOPHAGE-XR) 500 MG 24 hr tablet Take 1 tablet (500 mg) by mouth daily (with dinner) (Patient taking differently: Take 1,000 mg by  mouth daily (with dinner) ) 90 tablet 3     vitamin D3 (CHOLECALCIFEROL) 2000 units (50 mcg) tablet Take 1 tablet by mouth daily       ALPRAZolam (XANAX) 0.5 MG tablet Take 1 tablet (0.5 mg) by mouth nightly as needed for anxiety (Patient not taking: Reported on 2020) 10 tablet 0       ALLERGIES   No Known Allergies    PAST MEDICAL HISTORY:  Past Medical History:   Diagnosis Date     Depressive disorder, not elsewhere classified     treated with Zoloft for 8 months     Lumbago     L5-S1 radiculopathy     Spider veins      Tachycardia, unspecified     Single episode treated with Adenosine       PAST SURGICAL HISTORY:  Past Surgical History:   Procedure Laterality Date     BLADDER SURGERY       C NONSPECIFIC PROCEDURE      Tubal ligation     C NONSPECIFIC PROCEDURE      Lasik eye surgery     C NONSPECIFIC PROCEDURE  3/07     L5-S1 discectomy     CYSTOCELE REPAIR N/A      HYSTERECTOMY N/A     Supracervical. See Path via Care Everywhere at [facility].      ORTHOPEDIC SURGERY Left 16    left foot bunionectomy     ORTHOPEDIC SURGERY      Foot surgery      RECTOCELE REPAIR N/A 2013       FAMILY HISTORY:  Family History   Problem Relation Age of Onset     Diabetes Mother      Cardiovascular Mother         a fib     Lipids Mother      Melanoma Father 77     Diabetes Brother      Neuromuscular Disease Brother         severe progressive MS - dx'd        SOCIAL HISTORY:  Social History     Socioeconomic History     Marital status:      Spouse name: None     Number of children: None     Years of education: None     Highest education level: None   Occupational History     None   Social Needs     Financial resource strain: None     Food insecurity:     Worry: None     Inability: None     Transportation needs:     Medical: None     Non-medical: None   Tobacco Use     Smoking status: Former Smoker     Types: Cigarettes     Last attempt to quit: 3/12/1983     Years since quittin.8      Smokeless tobacco: Never Used   Substance and Sexual Activity     Alcohol use: Yes     Alcohol/week: 0.0 - 2.5 standard drinks     Comment: socially     Drug use: No     Sexual activity: Yes     Partners: Male   Lifestyle     Physical activity:     Days per week: None     Minutes per session: None     Stress: None   Relationships     Social connections:     Talks on phone: None     Gets together: None     Attends Rastafarian service: None     Active member of club or organization: None     Attends meetings of clubs or organizations: None     Relationship status: None     Intimate partner violence:     Fear of current or ex partner: None     Emotionally abused: None     Physically abused: None     Forced sexual activity: None   Other Topics Concern     Parent/sibling w/ CABG, MI or angioplasty before 65F 55M? Not Asked   Social History Narrative     None       Review of Systems:  Skin:  Negative for       Eyes:  Positive for glasses    ENT:  Negative      Respiratory:  Negative       Cardiovascular:    syncope or near-syncope;Positive for;lightheadedness;dizziness;fatigue    Gastroenterology: Positive for   heavy appetite   Genitourinary:  not assessed      Musculoskeletal:  Positive for arthritis    Neurologic:  Positive for headaches    Psychiatric:  Positive for anxiety    Heme/Lymph/Imm:  Negative      Endocrine:  Positive for diabetes      [id number]              Thank you for allowing me to participate in the care of your patient.      Sincerely,     Hector Negron MD     [facility]    cc:   Cary Moran NP  [address] [place] [address]

## 2020-01-23 NOTE — PROGRESS NOTES
"Service Date: 01/23/2020      HISTORY OF PRESENT ILLNESS:    It is my pleasure seeing Ms. Leticia Tompkins, a delightful 59-year-old female for a recent episode of syncope.  She has been referred by Dr. Lc Michael.      Ms. Tompkins has type 2 diabetes mellitus (on metformin), but no known cardiovascular disease.  On 01/10/2020, she felt dizzy in the morning.  She had to sit down and drink fluids.  This sensation passed.  Later she went to the store.  While standing at the checkout counter, she became extremely lightheaded.  She believes she lost consciousness and ended up on the floor.  The next thing she remembers is one of the attendants asking her whether she was okay.  There was no tongue or lip biting, no incontinence or chest pain.  She had no palpitation prior to this event.  Her  was called and he drove her home.  Later that day she was seen at her PCP's office. Propranolol and alprazolam were started for \"anxiety disorder.\"  The patient only took propranolol for one day.  Of note, the patient's mother had passed away a few days earlier.      Since then, the patient has had momentary episodes where she feels lightheaded and her heart \"stops for a moment.\"  She has had no recurrent syncope.  In fact, the episode on 01/10/2020 was the only episode of syncope she has ever had.      She says she had an episode of AF over 20 years ago.  She felt her heart fluttering and she felt very tired.  She came to the emergency room at [facility] (I do not have records) and was cardioverted.  As far as she knows, she has not had AF recurrence since then.      She is physically active without chest pain to suggest angina.  She has no orthopnea, PND or unusual dyspnea on exertion.      Family history is negative for premature heart disease.  The patient is a nonsmoker and drinks occasional alcoholic beverages.  She lives with her .  She is a retired  to a .    "     PHYSICAL EXAMINATION:   VITAL SIGNS:  Blood pressure 125/85, pulse 78 and regular, weight 79 kg, height 164 cm.   GENERAL:  She is a very pleasant woman who is alert, oriented and in no distress.   HEENT:  Head is normocephalic and atraumatic.  Sclerae are anicteric.  Mouth and oropharynx clear.   NECK:  Supple without thyromegaly or lymphadenopathy.  No carotid bruits.   LUNGS:  Completely clear.  No crackles or wheezes.   CARDIOVASCULAR:  Normal JVP.  Regular rhythm without gallop, murmur or rub.   ABDOMEN:  Soft and nontender.  Negative HJR.   EXTREMITIES:  No clubbing, cyanosis or edema.   SKIN:  No rash.   BACK:  No CVA tenderness.   NEUROLOGIC:  Alert and oriented x3.   SKIN:  No rashes.   VASCULAR:  2+ carotid, dorsalis pedis and posterior tibial pulses.      DIAGNOSTIC STUDIES:   Recent laboratory tests:  Sodium 143, potassium 5.0, creatinine 0.75.  TSH 1.67, ALT 26.  Hematocrit 47%.      Her 12 lead ECG today showed sinus rhythm with a somewhat low voltage, otherwise normal tracing.        IMPRESSION:   1.  Single syncopal episode of unclear etiology.  The event started in the standing position.  The patient had a brief prodrome at the supermarket and experienced another prolonged period of lightheadedness earlier that morning at home.  I am suspecious of orthostatic/hypotensive etiology rather than an arrhythmia.         She also mentioned that she is getting a momentary sensation that her heart is stopping and she becomes lightheaded.  The most likely explanation for this symptom is ectopic beats with compensatory pauses, though I cannot exclude a true pause.  Further evaluation is recommended.     2.  Past history of atrial fibrillation.  She reports of a single symptomatic AF episode 20 years ago.  It would be unusual for this arrhythmia to occur once without subsequent recurrence, especially in the absence of an obvious trigger back then.  A cardiac monitor will be helpful to assess whether she is  having asymptomatic AF.      RECOMMENDATIONS:   a.  14-day cardiac event monitor.   b.  Stop propranolol.  I do not see a strong indication for this medication.   c.  Echocardiogram.   d.  We will contact the patient with the results of her cardiac tests and arrange for further follow-up as needed.      I appreciate the opportunity to be part of her care.        KRAIG PALENCIA MD, [facility]            cc:   Lc Michael MD   [facility] [address] [place] [address]          D: 2020   T: 2020   MT: [email or web address]      Name:     JULES ROSS   MRN:      [id number]        Account:      [id number]   :      1960           Service Date: 2020      Document: [id number]

## 2020-01-27 ENCOUNTER — HOSPITAL ENCOUNTER (OUTPATIENT)
Dept: CARDIOLOGY | Facility: CLINIC | Age: 60
Discharge: HOME | End: 2020-01-27
Attending: INTERNAL MEDICINE | Admitting: INTERNAL MEDICINE
Payer: COMMERCIAL

## 2020-01-27 DIAGNOSIS — R00.0 TACHYCARDIA: ICD-10-CM

## 2020-01-27 PROCEDURE — 93306 TTE W/DOPPLER COMPLETE: CPT

## 2020-01-27 PROCEDURE — 0296T ZIO PATCH HOLTER ADULT PEDIATRIC GREATER THAN 48 HRS: CPT

## 2020-01-27 PROCEDURE — 0298T ZIO PATCH HOLTER ADULT PEDIATRIC GREATER THAN 48 HRS: CPT | Performed by: INTERNAL MEDICINE

## 2020-01-27 PROCEDURE — 93306 TTE W/DOPPLER COMPLETE: CPT | Mod: 26 | Performed by: INTERNAL MEDICINE

## 2020-01-28 ENCOUNTER — TELEPHONE (OUTPATIENT)
Dept: CARDIOLOGY | Facility: CLINIC | Age: 60
End: 2020-01-28

## 2020-01-28 NOTE — TELEPHONE ENCOUNTER
Left a voicemail for patient informing her of results. Asked that she call back with any questions.     ----- Message from Hector Negron MD sent at 1/28/2020  6:46 AM CST -----  Normal echo-.  Please let her know.  ROSHNI Baptiste

## 2020-02-06 NOTE — PROGRESS NOTES
[facility]  [address] [place] [id number] [phone]  Dept: [phone]    PRE-OP EVALUATION:  Today's date: 2020    Leticia Tompkins (: 1960) presents for pre-operative evaluation assessment as requested by Dr. Chucky Barrientos.  She requires evaluation and anesthesia risk assessment prior to undergoing surgery/procedure for treatment of Flexor plate repair .    Proposed Surgery/ Procedure: Flexor plate tear  Date of Surgery/ Procedure: 20  Time of Surgery/ Procedure: 7am  Hospital/Surgical Facility: [facility]  [phone]  Primary Physician: Lc Michael  Type of Anesthesia Anticipated: to be determined    Patient has a Health Care Directive or Living Will:  NO    1. NO - Do you have a history of heart attack, stroke, stent, bypass or surgery on an artery in the head, neck, heart or legs?  2. NO - Do you ever have any pain or discomfort in your chest?  3. NO - Do you have a history of  Heart Failure?  4. NO - Are you troubled by shortness of breath when: walking on the level, up a slight hill or at night?  5. NO - Do you currently have a cold, bronchitis or other respiratory infection?  6. NO - Do you have a cough, shortness of breath or wheezing?  7. NO - Do you sometimes get pains in the calves of your legs when you walk?  8. NO - Do you or anyone in your family have previous history of blood clots?  9. NO - Do you or does anyone in your family have a serious bleeding problem such as prolonged bleeding following surgeries or cuts?  10. NO - Have you ever had problems with anemia or been told to take iron pills?  11. NO - Have you had any abnormal blood loss such as black, tarry or bloody stools, or abnormal vaginal bleeding?  12. NO - Have you ever had a blood transfusion?  13. NO - Have you or any of your relatives ever had problems with anesthesia?  14. NO - Do you have sleep apnea, excessive snoring or daytime drowsiness?  15. NO - Do you have any  prosthetic heart valves?  16. NO - Do you have prosthetic joints?  17. NO - Is there any chance that you may be pregnant?      HPI:     HPI related to upcoming procedure: Walking with grandchildren in august after completing a ten mile hike and felt it tear. Was treated with ice and rest seen by her podiatrist who ordered an MRI and suggested surgical repair.       DEPRESSION - Patient has a long history of Depression of moderate severity requiring medication for control with recent symptoms being stable..Current symptoms of depression include none.       MEDICAL HISTORY:     Patient Active Problem List    Diagnosis Date Noted     Diabetes mellitus, type 2 (H) 01/10/2020     Priority: Medium     Pre-diabetes 12/05/2018     Priority: Medium     Elevated C-reactive protein (CRP) 12/05/2018     Priority: Medium     Elevated sed rate 12/05/2018     Priority: Medium     Diverticulosis of large intestine without hemorrhage 08/21/2018     Priority: Medium     On multiple CTs. No diverticulitis on 8/2018 CT.       Stress incontinence of urine 03/20/2017     Priority: Medium     Cystocele, midline 03/20/2017     Priority: Medium     Health Care Home 03/13/2014     Priority: Medium     State Tier Level:  0         Acquired hallux valgus of right foot 06/13/2012     Priority: Medium     Overview:   Symptomatic bunion right foot       CARDIOVASCULAR SCREENING; LDL GOAL LESS THAN 160 10/31/2010     Priority: Medium     Carotene pigmentation of skin 07/17/2009     Priority: Medium     Lumbago      Priority: Medium     L5-S1 radiculopathy        Past Medical History:   Diagnosis Date     Depressive disorder, not elsewhere classified 1997    treated with Zoloft for 8 months     Lumbago     L5-S1 radiculopathy     Spider veins      Tachycardia, unspecified 2004    Single episode treated with Adenosine     Past Surgical History:   Procedure Laterality Date     BLADDER SURGERY       C NONSPECIFIC PROCEDURE  1991    Tubal ligation      C NONSPECIFIC PROCEDURE      Lasik eye surgery     C NONSPECIFIC PROCEDURE  3/07     L5-S1 discectomy     CYSTOCELE REPAIR N/A      HYSTERECTOMY N/A     Supracervical. See Path via Care Everywhere at [facility].      ORTHOPEDIC SURGERY Left 16    left foot bunionectomy     ORTHOPEDIC SURGERY      Foot surgery      RECTOCELE REPAIR N/A 2013     Current Outpatient Medications   Medication Sig Dispense Refill     buPROPion (WELLBUTRIN XL) 150 MG 24 hr tablet Take 1 tablet (150 mg) by mouth every morning 30 tablet 11     IBUPROFEN PO Take 200-400 mg by mouth as needed for moderate pain Reported on 2017       metFORMIN (GLUCOPHAGE-XR) 500 MG 24 hr tablet Take 1 tablet (500 mg) by mouth daily (with dinner) (Patient taking differently: Take 1,000 mg by mouth daily (with dinner) ) 90 tablet 3     vitamin D3 (CHOLECALCIFEROL) 2000 units (50 mcg) tablet Take 1 tablet by mouth daily       ALPRAZolam (XANAX) 0.5 MG tablet Take 1 tablet (0.5 mg) by mouth nightly as needed for anxiety (Patient not taking: Reported on 2020) 10 tablet 0     OTC products: NSAIDS occasionally     No Known Allergies   Latex Allergy: NO    Social History     Tobacco Use     Smoking status: Former Smoker     Types: Cigarettes     Last attempt to quit: 3/12/1983     Years since quittin.9     Smokeless tobacco: Never Used   Substance Use Topics     Alcohol use: Yes     Alcohol/week: 0.0 - 2.5 standard drinks     Comment: socially     History   Drug Use No       REVIEW OF SYSTEMS:   CONSTITUTIONAL: NEGATIVE for fever, chills, change in weight  INTEGUMENTARY/SKIN: NEGATIVE for worrisome rashes, moles or lesions  EYES: NEGATIVE for vision changes or irritation  ENT/MOUTH: NEGATIVE for ear, mouth and throat problems  RESP: NEGATIVE for significant cough or SOB  BREAST: NEGATIVE for masses, tenderness or discharge  CV: POSITIVE for palpitations and NEGATIVE for chest pain/chest pressure, claudication, cyanosis, diaphoresis,  "dyspnea on exertion, lower extremity edema, paroxysmal nocturnal dyspnea and tachycardia  GI: NEGATIVE for nausea, abdominal pain, heartburn, or change in bowel habits  : NEGATIVE for frequency, dysuria, or hematuria  MUSCULOSKELETAL: NEGATIVE for significant arthralgias or myalgia  NEURO: NEGATIVE for weakness, dizziness or paresthesias  ENDOCRINE: NEGATIVE for temperature intolerance, skin/hair changes  HEME: NEGATIVE for bleeding problems  PSYCHIATRIC: NEGATIVE for changes in mood or affect    EXAM:   /78   Pulse 89   Resp 16   Ht 1.638 m (5' 4.5\")   Wt 77.1 kg (170 lb)   SpO2 95%   BMI 28.73 kg/m      GENERAL APPEARANCE: healthy, alert and no distress     EYES: EOMI, PERRL     NECK: no adenopathy, no asymmetry, masses, or scars and thyroid normal to palpation     RESP: lungs clear to auscultation - no rales, rhonchi or wheezes     CV: regular rates and rhythm, normal S1 S2, no S3 or S4 and no murmur, click or rub     ABDOMEN:  soft, nontender, no HSM or masses and bowel sounds normal     MS: extremities normal- no gross deformities noted, no evidence of inflammation in joints, FROM in all extremities.     SKIN: no suspicious lesions or rashes     NEURO: Normal strength and tone, sensory exam grossly normal, mentation intact and speech normal     PSYCH: mentation appears normal. and affect normal/bright     LYMPHATICS: No cervical adenopathy    DIAGNOSTICS:       Recent Labs   Lab Test 01/10/20  1552 01/10/20  1528 09/10/19  1555 09/10/19  1545 12/04/18  1213  03/06/18  1731   HGB  --  15.1 14.0  --   --    < >  --    PLT  --  354 356  --   --    < >  --    INR  --   --   --   --   --   --  1.08     --   --  138  --    < > 134   POTASSIUM 5.0  --   --  4.3  --    < > 3.6   CR 0.75  --   --  0.63  --    < > 0.60   A1C  --   --   --  6.5* 6.4*  --   --     < > = values in this interval not displayed.        IMPRESSION:   Reason for surgery/procedure: Flexor plate tear requiring surgical " repair  Diagnosis/reason for consult: Preoperative assessment    The proposed surgical procedure is considered INTERMEDIATE risk.    REVISED CARDIAC RISK INDEX  The patient has the following serious cardiovascular risks for perioperative complications such as (MI, PE, VFib and 3  AV Block):  No serious cardiac risks  INTERPRETATION: 0 risks: Class I (very low risk - 0.4% complication rate)    The patient has the following additional risks for perioperative complications:  No identified additional risks      ICD-10-CM    1. Preop general physical exam Z01.818 Microalbumin ([facility])     Hemoglobin A1C ([facility])     CBC with Diff/Plt ([facility])   2. Type 2 diabetes mellitus with other specified complication, without long-term current use of insulin (H) E11.69 CANCELED: Hemoglobin A1C ([facility])   3. Screening cholesterol level Z13.220 Lipid Panel ([facility])   4. Screening examination for infectious disease Z11.9 HCV Antibody ([facility])     HIV 1/0/2 Rflx ([facility])       RECOMMENDATIONS:     --Patient is to take all scheduled medications on the day of surgery EXCEPT for modifications listed below.  Alprazolam    APPROVAL GIVEN to proceed with proposed procedure, without further diagnostic evaluation       Signed Electronically by: Cary Moran NP    Copy of this evaluation report is provided to requesting physician.    [facility] Preop Guidelines    Revised Cardiac Risk Index

## 2020-02-07 ENCOUNTER — OFFICE VISIT (OUTPATIENT)
Dept: FAMILY MEDICINE | Facility: CLINIC | Age: 60
End: 2020-02-07

## 2020-02-07 VITALS
RESPIRATION RATE: 16 BRPM | WEIGHT: 170 LBS | BODY MASS INDEX: 28.32 KG/M2 | HEIGHT: 65 IN | SYSTOLIC BLOOD PRESSURE: 120 MMHG | DIASTOLIC BLOOD PRESSURE: 78 MMHG | HEART RATE: 89 BPM | OXYGEN SATURATION: 95 %

## 2020-02-07 DIAGNOSIS — Z01.818 PREOP GENERAL PHYSICAL EXAM: Primary | ICD-10-CM

## 2020-02-07 DIAGNOSIS — E11.69 TYPE 2 DIABETES MELLITUS WITH OTHER SPECIFIED COMPLICATION, WITHOUT LONG-TERM CURRENT USE OF INSULIN (H): ICD-10-CM

## 2020-02-07 DIAGNOSIS — Z13.220 SCREENING CHOLESTEROL LEVEL: ICD-10-CM

## 2020-02-07 DIAGNOSIS — Z11.9 SCREENING EXAMINATION FOR INFECTIOUS DISEASE: ICD-10-CM

## 2020-02-07 LAB
% GRANULOCYTES: 63.3 % (ref 42.2–75.2)
A/C RATIO MG/G: <30 MG/G
ALBUMIN (URINE) MG/L: 10 MG/L
HCT VFR BLD AUTO: 42 % (ref 35–46)
HEMOGLOBIN: 13.5 G/DL (ref 11.8–15.5)
INTERPRETATION: NORMAL
LYMPHOCYTES NFR BLD AUTO: 29.4 % (ref 20.5–51.1)
MCH RBC QN AUTO: 31.4 PG (ref 27–31)
MCHC RBC AUTO-ENTMCNC: 32.3 G/DL (ref 33–37)
MCV RBC AUTO: 97.1 FL (ref 80–100)
MONOCYTES NFR BLD AUTO: 7.3 % (ref 1.7–9.3)
PLATELET # BLD AUTO: 353 K/UL (ref 140–450)
RBC # BLD AUTO: 4.32 X10/CMM (ref 3.7–5.2)
URINE CREATININE MG/DL - QUEST: 100 MG/DL
WBC # BLD AUTO: 9.6 X10/CMM (ref 3.8–11)

## 2020-02-07 PROCEDURE — 82570 ASSAY OF URINE CREATININE: CPT | Performed by: NURSE PRACTITIONER

## 2020-02-07 PROCEDURE — 36415 COLL VENOUS BLD VENIPUNCTURE: CPT | Performed by: NURSE PRACTITIONER

## 2020-02-07 PROCEDURE — 99214 OFFICE O/P EST MOD 30 MIN: CPT | Performed by: NURSE PRACTITIONER

## 2020-02-07 PROCEDURE — 82044 UR ALBUMIN SEMIQUANTITATIVE: CPT | Performed by: NURSE PRACTITIONER

## 2020-02-07 PROCEDURE — 85025 COMPLETE CBC W/AUTO DIFF WBC: CPT | Performed by: NURSE PRACTITIONER

## 2020-02-07 ASSESSMENT — MIFFLIN-ST. JEOR: SCORE: 1329.05

## 2020-02-08 LAB
CHOLEST SERPL-MCNC: 221 MG/DL (ref 100–199)
HBA1C MFR BLD: 7.2 % (ref 4.8–5.6)
HCV AB SERPL QL IA: <0.1 S/CO RATIO (ref 0–0.9)
HDLC SERPL-MCNC: 78 MG/DL
HIV SCREEN 4TH GEN WITH RFLX: NON REACTIVE
LDL/HDL RATIO: 1.7 RATIO (ref 0–3.2)
LDLC SERPL CALC-MCNC: 132 MG/DL (ref 0–99)
TRIGL SERPL-MCNC: 56 MG/DL (ref 0–149)
VLDLC SERPL CALC-MCNC: 11 MG/DL (ref 5–40)

## 2020-02-13 NOTE — PROGRESS NOTES
2/10/20 faxed preop and labs to [facility] @ [phone]    Corbin Green,   [facility]  [phone]

## 2020-02-19 ENCOUNTER — TELEPHONE (OUTPATIENT)
Dept: CARDIOLOGY | Facility: CLINIC | Age: 60
End: 2020-02-19

## 2020-02-19 NOTE — TELEPHONE ENCOUNTER
Left a voicemail for patient informing her of results and plan. Asked that she call back with any questions or future episodes.     ----- Message from Hector Negron MD sent at 2/19/2020  9:41 AM CST -----  Unremarkable ZP with only few brief SVT events, of no clinical consequence.  No arrhythmia responsible for syncope identified.  Please call.  No further cardiac w/up for now.  She should call if she faints again.  DI

## 2020-02-28 ENCOUNTER — MYC REFILL (OUTPATIENT)
Dept: FAMILY MEDICINE | Facility: CLINIC | Age: 60
End: 2020-02-28

## 2020-02-28 DIAGNOSIS — R73.03 PRE-DIABETES: ICD-10-CM

## 2020-02-28 RX ORDER — METFORMIN HCL 500 MG
500 TABLET, EXTENDED RELEASE 24 HR ORAL
Qty: 90 TABLET | Refills: 0 | Status: SHIPPED | OUTPATIENT
Start: 2020-02-28 | End: 2020-04-09

## 2020-03-22 ENCOUNTER — HEALTH MAINTENANCE LETTER (OUTPATIENT)
Age: 60
End: 2020-03-22

## 2020-04-23 ENCOUNTER — OFFICE VISIT (OUTPATIENT)
Dept: FAMILY MEDICINE | Facility: CLINIC | Age: 60
End: 2020-04-23

## 2020-04-23 VITALS
SYSTOLIC BLOOD PRESSURE: 120 MMHG | TEMPERATURE: 98.2 F | WEIGHT: 170 LBS | DIASTOLIC BLOOD PRESSURE: 94 MMHG | RESPIRATION RATE: 16 BRPM | BODY MASS INDEX: 28.73 KG/M2 | HEART RATE: 82 BPM

## 2020-04-23 DIAGNOSIS — E11.69 TYPE 2 DIABETES MELLITUS WITH OTHER SPECIFIED COMPLICATION, WITHOUT LONG-TERM CURRENT USE OF INSULIN (H): Primary | ICD-10-CM

## 2020-04-23 PROCEDURE — 99207 C FOOT EXAM  NO CHARGE: CPT | Performed by: FAMILY MEDICINE

## 2020-04-23 PROCEDURE — 99213 OFFICE O/P EST LOW 20 MIN: CPT | Performed by: FAMILY MEDICINE

## 2020-04-23 ASSESSMENT — PATIENT HEALTH QUESTIONNAIRE - PHQ9: SUM OF ALL RESPONSES TO PHQ QUESTIONS 1-9: 3

## 2020-04-23 NOTE — PROGRESS NOTES
Problem(s) Oriented visit        SUBJECTIVE:                                                    Leticia Tompkins is a 59 year old female who presents to clinic today for the following health issues :        1. Type 2 diabetes mellitus with other specified complication, without long-term current use of insulin (H)  [id number]  Diabetes  she  reports no new symptoms.  No significnat or regular episodes of hypo or hyperglycemia  Medication compliance: compliant most of the time  Diabetic diet compliance: compliant most of the time  Diabetic ROS: no polyuria or polydipsia, no chest pain, dyspnea or TIA's, no numbness, tingling or pain in extremities    Home blood sugar monitoring: are performed regularly, Review of patients self blood glucose monitoring shows fasting most always < 130 and post prandial average under 170.  As a direct cause of their history of diabetes they have the following Diabetic complications: none    Most  recent A1C: Smoking: BP:   The last 5 available A1C [place] from [facility] [place] [facility]:  No components found for: [id number]     Lab Results   Component Value Date    A1C 7.2 02/07/2020    A1C 6.5 09/10/2019    A1C 6.4 12/04/2018    History   Smoking Status     Former Smoker     Types: Cigarettes     Quit date: 3/12/1983   Smokeless Tobacco     Never Used    BP Readings from Last 1 Encounters:   04/23/20 (!) 120/94        Kidney studies:  Creatinine   Date Value Ref Range Status   01/10/2020 0.75 0.57 - 1.00 mg/dL Final   09/10/2019 0.63 0.57 - 1.00 mg/dL Final   12/03/2018 0.68 0.57 - 1.00 mg/dL Final   ]    GFR Estimate   Date Value Ref Range Status   03/08/2018 >90 >60 mL/min/1.7m2 Final     Comment:     Non  GFR Calc                No components found for: MICORALBUMINLC      GFR Estimate If Black   Date Value Ref Range Status   03/08/2018 >90 >60 mL/min/1.7m2 Final     Comment:      GFR Calc              Statin and ASA:  Current Outpatient Medications    Medication     IBUPROFEN PO     metFORMIN (GLUCOPHAGE-XR) 500 MG 24 hr tablet     vitamin D3 (CHOLECALCIFEROL) 2000 units (50 mcg) tablet     No current facility-administered medications for this visit.        - Hemoglobin A1C ([facility])  - C FOOT EXAM  NO CHARGE  - OPHTHALMOLOGY ADULT REFERRAL       Problem list, Medication list, Allergies, and Medical/Social/Surgical histories reviewed in [facility] and updated as appropriate.   Additional history: as documented    ROS:  General and Resp. completed and negative except as noted above    Histories:   Patient Active Problem List   Diagnosis     Lumbago     Carotene pigmentation of skin     CARDIOVASCULAR SCREENING; LDL GOAL LESS THAN 160     Health Care Home     Stress incontinence of urine     Cystocele, midline     Acquired hallux valgus of right foot     Diverticulosis of large intestine without hemorrhage     Pre-diabetes     Elevated C-reactive protein (CRP)     Elevated sed rate     Diabetes mellitus, type 2 (H)     Past Surgical History:   Procedure Laterality Date     BLADDER SURGERY       C NONSPECIFIC PROCEDURE      Tubal ligation     C NONSPECIFIC PROCEDURE      Lasik eye surgery     C NONSPECIFIC PROCEDURE  3/07     L5-S1 discectomy     CYSTOCELE REPAIR N/A      HYSTERECTOMY N/A     Supracervical. See Path via Care Everywhere at [facility] [place].      ORTHOPEDIC SURGERY Left 4-19-16    left foot bunionectomy     ORTHOPEDIC SURGERY      Foot surgery      RECTOCELE REPAIR N/A 2013       Social History     Tobacco Use     Smoking status: Former Smoker     Types: Cigarettes     Last attempt to quit: 3/12/1983     Years since quittin.1     Smokeless tobacco: Never Used   Substance Use Topics     Alcohol use: Yes     Alcohol/week: 0.0 - 2.5 standard drinks     Comment: socially     Family History   Problem Relation Age of Onset     Diabetes Mother      Cardiovascular Mother         a fib     Lipids Mother      Melanoma Father 77     Diabetes Brother       Neuromuscular Disease Brother         severe progressive MS - dx'd            OBJECTIVE:                                                    BP (!) 120/94   Pulse 82   Temp 98.2  F (36.8  C) (Oral)   Resp 16   Wt 77.1 kg (170 lb)   BMI 28.73 kg/m    Body mass index is 28.73 kg/m .   APPEARANCE: = Relaxed and in no distress  Conj/Eyelids = noninjected and lids and lashes are without inflammation  PERRLA/Irises = Pupils Round Reactive to Light and Irisis without inflammation  Neck = No anterior or posterior adenopathy appreciated.  Thyroid = Not enlarged and no masses felt  Resp effort = Calm regular breathing  Breath Sounds = Good air movement with no rales or rhonchi in any lung fields  Heart Rate, Rhythm, & sounds (no Murm)  = Regular rate and rhythm with no S3, S4, or murmur appreciated.  Carotid Art's = Pulses full and equal and no bruits appreciated  Abdomen = Soft, nontender, no masses, & bowel sounds in all quadrants  Liver/Spleen = Normal span and no splenomegaly noted  Digits and Nails = FROM in all finger joints, no nail dystrophy  Ext (edema) = No pretibial edema noted or elsewhere  Musculsktl =  Strength and ROM of major joints are within normal limits  SKIN = absent significant rashes or lesions   Recent/Remote Memory = Alert and Oriented x 3  Mood/Affect = Cooperative and interested     ASSESSMENT/PLAN:                                                        Leticia was seen today for diabetes.    Diagnoses and all orders for this visit:    Type 2 diabetes mellitus with other specified complication, without long-term current use of insulin (H)  -     Hemoglobin A1C ([facility])  -     C FOOT EXAM  NO CHARGE  -     OPHTHALMOLOGY ADULT REFERRAL    will recheck in 3 months.    Work on weight loss  Regular exercise  There are no Patient Instructions on file for this visit.    The following health maintenance items are reviewed in Epic and correct as of today:  Health Maintenance   Topic Date Due      DIABETIC FOOT EXAM  1960     ADVANCE CARE PLANNING  1960     EYE EXAM  1960     HIV SCREENING  09/28/1973     PNEUMOCOCCAL IMMUNIZATION 19-64 MEDIUM RISK (1 of 1 - PPSV23) 09/28/1977     ZOSTER IMMUNIZATION (1 of 2) 09/28/2008     PREVENTIVE CARE VISIT  12/03/2019     PHQ-2  01/01/2020     A1C  05/07/2020     BMP  01/10/2021     LIPID  02/07/2021     MICROALBUMIN  02/07/2021     MAMMO SCREENING  10/23/2021     PAP  12/03/2021     COLORECTAL CANCER SCREENING  03/26/2023     DTAP/TDAP/TD IMMUNIZATION (3 - Td) 04/05/2026     HEPATITIS C SCREENING  Completed     INFLUENZA VACCINE  Completed     IPV IMMUNIZATION  Aged Out     MENINGITIS IMMUNIZATION  Aged Out       Lc Michael MD  [facility]  [facility]  [phone]    For any issues my office # is [phone]

## 2020-07-02 ENCOUNTER — MYC REFILL (OUTPATIENT)
Dept: FAMILY MEDICINE | Facility: CLINIC | Age: 60
End: 2020-07-02

## 2020-07-02 DIAGNOSIS — R73.03 PRE-DIABETES: ICD-10-CM

## 2020-07-02 RX ORDER — METFORMIN HCL 500 MG
500 TABLET, EXTENDED RELEASE 24 HR ORAL 2 TIMES DAILY WITH MEALS
Qty: 180 TABLET | Refills: 0 | Status: SHIPPED | OUTPATIENT
Start: 2020-07-02 | End: 2020-09-28

## 2020-08-10 ENCOUNTER — OFFICE VISIT (OUTPATIENT)
Dept: FAMILY MEDICINE | Facility: CLINIC | Age: 60
End: 2020-08-10

## 2020-08-10 VITALS
HEART RATE: 73 BPM | WEIGHT: 158.6 LBS | BODY MASS INDEX: 26.8 KG/M2 | SYSTOLIC BLOOD PRESSURE: 136 MMHG | OXYGEN SATURATION: 94 % | TEMPERATURE: 97.4 F | DIASTOLIC BLOOD PRESSURE: 94 MMHG

## 2020-08-10 DIAGNOSIS — L82.0 INFLAMED SEBORRHEIC KERATOSIS: ICD-10-CM

## 2020-08-10 DIAGNOSIS — E11.69 TYPE 2 DIABETES MELLITUS WITH OTHER SPECIFIED COMPLICATION, WITHOUT LONG-TERM CURRENT USE OF INSULIN (H): Primary | ICD-10-CM

## 2020-08-10 DIAGNOSIS — N64.59 INVERTED NIPPLE: ICD-10-CM

## 2020-08-10 PROCEDURE — 17110 DESTRUCTION B9 LES UP TO 14: CPT | Performed by: FAMILY MEDICINE

## 2020-08-10 PROCEDURE — 93050 ART PRESSURE WAVEFORM ANALYS: CPT | Performed by: FAMILY MEDICINE

## 2020-08-10 PROCEDURE — 99213 OFFICE O/P EST LOW 20 MIN: CPT | Mod: 25 | Performed by: FAMILY MEDICINE

## 2020-08-10 RX ORDER — MULTIPLE VITAMINS W/ MINERALS TAB 9MG-400MCG
TAB ORAL
COMMUNITY
Start: 2020-07-27

## 2020-08-10 RX ORDER — MULTIVITAMIN WITH IRON
TABLET ORAL DAILY
COMMUNITY
Start: 2020-07-27

## 2020-08-10 NOTE — PROGRESS NOTES
[id number]  Diabetes  she  reports no new symptoms.  No significnat or regular episodes of hypo or hyperglycemia  Medication compliance: compliant most of the time  Diabetic diet compliance: compliant most of the time  Diabetic ROS: no polyuria or polydipsia, no chest pain, dyspnea or TIA's, no numbness, tingling or pain in extremities    Home blood sugar monitoring: are performed regularly, Review of patients self blood glucose monitoring shows fasting most always < 130 and post prandial average under 170.  As a direct cause of their history of diabetes they have the following Diabetic complications: none    Most  recent A1C: Smoking: BP:   The last 5 available A1C values from [facility] reference lab, [facility]:  No components found for: [id number]     Lab Results   Component Value Date    A1C 7.2 02/07/2020    A1C 6.5 09/10/2019    A1C 6.4 12/04/2018    History   Smoking Status     Former Smoker     Types: Cigarettes     Quit date: 3/12/1983   Smokeless Tobacco     Never Used    BP Readings from Last 1 Encounters:   08/10/20 (!) 136/94        Kidney studies:  Creatinine   Date Value Ref Range Status   01/10/2020 0.75 0.57 - 1.00 mg/dL Final   09/10/2019 0.63 0.57 - 1.00 mg/dL Final   12/03/2018 0.68 0.57 - 1.00 mg/dL Final   ]    GFR Estimate   Date Value Ref Range Status   03/08/2018 >90 >60 mL/min/1.7m2 Final     Comment:     Non  GFR Calc                No components found for: MICORALBUMINLC      GFR Estimate If Black   Date Value Ref Range Status   03/08/2018 >90 >60 mL/min/1.7m2 Final     Comment:      GFR Calc              Statin and ASA:  Current Outpatient Medications   Medication     IBUPROFEN PO     magnesium 250 MG tablet     metFORMIN (GLUCOPHAGE-XR) 500 MG 24 hr tablet     multivitamin w/minerals (MULTI-VITAMIN) tablet     vitamin D3 (CHOLECALCIFEROL) 2000 units (50 mcg) tablet     No current facility-administered medications for this visit.      New inversion of her right  nipple.    lawrence k's on the chest    Problem(s) Oriented visit      ROS:  General and CV completed and negative except as noted above     HISTORY:   reports current alcohol use.   reports that she quit smoking about 37 years ago. Her smoking use included cigarettes. She has never used smokeless tobacco.    Past Medical History:   Diagnosis Date     Depressive disorder, not elsewhere classified 1997     Lumbago      Spider veins      Tachycardia, unspecified 2004     Past Surgical History:   Procedure Laterality Date     BLADDER SURGERY       C NONSPECIFIC PROCEDURE  1991    Tubal ligation     C NONSPECIFIC PROCEDURE      Lasik eye surgery     C NONSPECIFIC PROCEDURE  3/07     L5-S1 discectomy     CYSTOCELE REPAIR N/A 0/2013     HYSTERECTOMY N/A 05/23/20014    Supracervical. See Path via Care Everywhere at [facility].      ORTHOPEDIC SURGERY Left 4-19-16    left foot bunionectomy     ORTHOPEDIC SURGERY      Foot surgery      RECTOCELE REPAIR N/A 05/2013       EXAM:  BP: 136/94[AtCor[   Pulse: 73    Temp: 97.4    Wt Readings from Last 2 Encounters:   08/10/20 71.9 kg (158 lb 9.6 oz)   04/23/20 77.1 kg (170 lb)       BMI= Body mass index is 26.8 kg/m .    EXAM:  APPEARANCE: = Relaxed and in no distress  Conj/Eyelids = noninjected and lids and lashes are without inflammation  PERRLA/Irises = Pupils Round Reactive to Light and Irisis without inflammation  Neck = No anterior or posterior adenopathy appreciated.  Thyroid = Not enlarged and no masses felt  Resp effort = Calm regular breathing  Breath Sounds = Good air movement with no rales or rhonchi in any lung fields  Heart Rate, Rythym, & sounds (no Murm)  = Regular rate and rythym with no S3, S4, or murmer appreciated.  Carotid Art's = Pulses full and equal and no bruits appreciated  Abdomen = Soft, nontender, no masses, & bowel sounds in all quadrants  Liver/Spleen = Normal span and no splenomegaly noted  Digits and Nails = FROM in all finger joints, no nail dystrophy  Ext  "(edema) = No pretibial edema noted or elsewhere  Musculsktl =  Strength and ROM of major joints are within normal limits  SKIN = absent significant rashes or lesions   Recent/Remote Memory = Alert and Oriented x 3  Mood/Affect = Cooperative and interested      Assessment/Plan:  Leticia was seen today for diabetes.    Diagnoses and all orders for this visit:    Type 2 diabetes mellitus with other specified complication, without long-term current use of insulin (H)  Discussed importance in compliance in all areas of diabetic control including diet, routine BS checks, absolute medication compliance, laboratory monitoring, and attending regular follow up appointments as ordered.  Failure to comply with instructions regarding diabetes will lead to a greater chance of poor diabetic control and therefore a greater chance of diabetes related complications such as CAD, CVA, PVD, and retinopathy/neuropathy/nephropathy.    -     C ART PRESS WAVEFORM ANALYS CENTRAL ART PRESSURE  -     Hemoglobin A1C ([facility])  -     Lipid Panel ([facility])  -     Insulin Level ([facility])    Inverted nipple  Was previously the same prior to kids,  Breast exam nrl today  Inflamed seborrheic keratosis  See note        COUNSELING:   reports that she quit smoking about 37 years ago. Her smoking use included cigarettes. She has never used smokeless tobacco.    Estimated body mass index is 26.8 kg/m  as calculated from the following:    Height as of 2/7/20: 1.638 m (5' 4.5\").    Weight as of this encounter: 71.9 kg (158 lb 9.6 oz).       Appropriate preventive services were discussed with this patient, including applicable screening as appropriate for cardiovascular disease, diabetes, osteopenia/osteoporosis, and glaucoma.  As appropriate for age/gender, discussed screening for colorectal cancer, prostate cancer, breast cancer, and cervical cancer. Checklist reviewing preventive services available has been given to the patient.    Reviewed patients plan of " care and provided an AVS. The Basic Care Plan (routine screening as documented in Health Maintenance) for Leticia meets the Care Plan requirement. This Care Plan has been established and reviewed with the  Patient.      The following health maintenance items are reviewed in Epic and correct as of today:  Health Maintenance   Topic Date Due     ADVANCE CARE PLANNING  1960     EYE EXAM  1960     HIV SCREENING  09/28/1973     PNEUMOCOCCAL IMMUNIZATION 19-64 MEDIUM RISK (1 of 1 - PPSV23) 09/28/1977     ZOSTER IMMUNIZATION (1 of 2) 09/28/2008     PREVENTIVE CARE VISIT  12/03/2019     A1C  05/07/2020     INFLUENZA VACCINE (1) 09/01/2020     BMP  01/10/2021     LIPID  02/07/2021     MICROALBUMIN  02/07/2021     DIABETIC FOOT EXAM  04/23/2021     MAMMO SCREENING  10/23/2021     PAP  12/03/2021     COLORECTAL CANCER SCREENING  03/26/2023     DTAP/TDAP/TD IMMUNIZATION (3 - Td) 04/05/2026     HEPATITIS C SCREENING  Completed     PHQ-2  Completed     IPV IMMUNIZATION  Aged Out     MENINGITIS IMMUNIZATION  Aged Out       Lc Michael  [facility]  For any issues my office # is [phone]

## 2020-08-10 NOTE — PROCEDURES
2 SebK's on the neck. Itchy, irritating and unsightly.    ROS: No bleeding problems.    cryotherapy applied  Liquid nitrogen was applied for 5-10 seconds x3  to the skin lesions      A:P  Irritated, red and itchy  Ricardo K's  The expected blistering or scabbing reaction explained. Do not pick at the areas. Patient reminded to expect hypopigmented scars from the procedure. Return if lesions fail to fully resolve.   Lc Michael MD

## 2020-08-10 NOTE — PATIENT INSTRUCTIONS
Wound Care Instructions for Liquid Nitrogen Treatment   The treatment area will appear white at first. Over the next several hours a fluid-filled blister may form. The blister can be very dark. If blister appears, it will remain blistered for about a week. Then a scab will form over the area.   Leave the blistered area uncovered as long as it remains closed. If the area breaks open, you may cover it with a clean band aid. Do not pull off the skin covering the blister.   If the blistered area becomes uncomfortably filled with fluid, you may release some of the fluid by puncturing the blister with a needle that has been cleansed with alcohol.   If the skin covering the blister comes off, clean the area daily with soap and water. Apply a small amount of Vaseline and then cover with a clean band aid. Change the band aid twice daily until the skin is completely healed.   Call us if.....   1. You have signs of infection such as thick yellow or pus-like drainage from the wound site or a fever over 100 degrees Fahrenheit.   2. You have any questions or are not sure how to take care of the wound.      Thank you for coming in today!   If you receive a survey via My Chart, mail or phone, please let us know if there was anything you especially appreciated today or if there is any way we can improve our clinic. We value your input.     Likewise, we are working hard to attend to our digital reputation.    Please consider reviewing our clinic on Google and/or PopJam via the following links:    [email or web address]                 [email or web address]    We truly appreciate you taking the time to do this!    General Information:    Today you had your appointment with Lc Michael MD    I am in the clinic:  Monday and Friday mornings  Tuesday and Thursday afternoons    I am not in the office Wednesdays, non urgent calls received on Wednesday will be addressed when I am back in  "the office on Thursday.    If lab work was done today as part of your evaluation you will generally be contacted via My Chart, mail, or phone with the results within 1-5 days. If there is an alarming result we will contact you by phone. Lab results come back at varying times, I generally wait until all labs are resulted before making comments on results. Please note labs are automatically released to My Chart once available.    If you need refills please contact your pharmacist. They will send a refill request to me to review. Please allow 3 business days for us to process all refill requests.    Please call or send a medical message with any questions or concerns.    Thank you for choosing [facility].  We truly appreciate you trusting us with your medical care.    Your next visit with us should be scheduled for Follow up in 1 year.     Listed next are the medical health Maintenance items we are tracking for your care and when they are next due (or overdue!)  Our goal is 100% \"up to date.\" Keep this list handy to call and schedule what you are due for in the future!    Health Maintenance Due   Topic Date Due     ADVANCE CARE PLANNING  1960     EYE EXAM  1960     HIV SCREENING  09/28/1973     PNEUMOCOCCAL IMMUNIZATION 19-64 MEDIUM RISK (1 of 1 - PPSV23) 09/28/1977     ZOSTER IMMUNIZATION (1 of 2) 09/28/2008     PREVENTIVE CARE VISIT  12/03/2019     A1C  05/07/2020       "

## 2020-08-11 LAB
CHOLEST SERPL-MCNC: 248 MG/DL (ref 100–199)
HBA1C MFR BLD: 6.5 % (ref 4.8–5.6)
HDLC SERPL-MCNC: 76 MG/DL
INSULIN: 3.3 UIU/ML (ref 2.6–24.9)
LDL/HDL RATIO: 2.1 RATIO (ref 0–3.2)
LDLC SERPL CALC-MCNC: 159 MG/DL (ref 0–99)
TRIGL SERPL-MCNC: 67 MG/DL (ref 0–149)
VLDLC SERPL CALC-MCNC: 13 MG/DL (ref 5–40)

## 2020-09-28 DIAGNOSIS — R73.03 PRE-DIABETES: ICD-10-CM

## 2020-09-28 RX ORDER — METFORMIN HCL 500 MG
TABLET, EXTENDED RELEASE 24 HR ORAL
Qty: 180 TABLET | Refills: 0 | Status: SHIPPED | OUTPATIENT
Start: 2020-09-28 | End: 2020-12-28

## 2020-10-27 ENCOUNTER — HOSPITAL ENCOUNTER (OUTPATIENT)
Dept: MAMMOGRAPHY | Facility: CLINIC | Age: 60
Discharge: HOME | End: 2020-10-27
Attending: FAMILY MEDICINE | Admitting: FAMILY MEDICINE
Payer: COMMERCIAL

## 2020-10-27 DIAGNOSIS — Z12.31 VISIT FOR SCREENING MAMMOGRAM: ICD-10-CM

## 2020-10-27 PROCEDURE — 77067 SCR MAMMO BI INCL CAD: CPT

## 2020-11-20 ENCOUNTER — VIRTUAL VISIT (OUTPATIENT)
Dept: FAMILY MEDICINE | Facility: CLINIC | Age: 60
End: 2020-11-20

## 2020-11-20 DIAGNOSIS — R68.83 CHILLS: ICD-10-CM

## 2020-11-20 DIAGNOSIS — R51.9 ACUTE NONINTRACTABLE HEADACHE, UNSPECIFIED HEADACHE TYPE: Primary | ICD-10-CM

## 2020-11-20 PROCEDURE — 99213 OFFICE O/P EST LOW 20 MIN: CPT | Mod: 95 | Performed by: FAMILY MEDICINE

## 2020-11-20 NOTE — PROGRESS NOTES
"  Problem(s) Oriented visit      Video-Visit Details    Type of service:  Video Visit    Video Start Time (time video started): 1004    Video End Time (time video stopped): 1014    Originating Location (pt. Location): Home    Distant Location (provider location):  [facility]     Mode of Communication:  Video Conference via [employer]    Physician has received verbal consent for a Video Visit from the patient? Yes      Otis Wang MD        Leticia Tompkins is being evaluated via a billable video visit.      The patient has been notified of following:     \"This video visit will be conducted via a call between you and your physician/provider. We have found that certain health care needs can be provided without the need for an in-person physical exam.  This service lets us provide the care you need with a video conversation.  If a prescription is necessary we can send it directly to your pharmacy.  If lab work is needed we can place an order for that and you can then stop by our lab to have the test done at a later time.    If during the course of the call the physician/provider feels a video visit is not appropriate, you will not be charged for this service.\"     Physician has received verbal consent for a Video Visit from the patient? Yes    SUBJECTIVE:                                                      Leticia Tompkins is a 60 year old female who is being seen through video consult for evaluation.  Concern for COVID-19  About how many days ago did these symptoms start? 1  Is this your first visit for this illness? Yes  In the 14 days before your symptoms started, have you had close contact with someone with COVID-19 (Coronavirus)? No  Do you have a fever or chills? Yes, I felt feverish or had chills  Are you having new or worsening difficulty breathing? No  Do you have new or worsening cough? No  Have you had any new or unexplained body aches? No    Have you experienced any of the following NEW " symptoms?    Headache: YES    Sore throat: No    Loss of taste or smell: No    Chest pain: No    Diarrhea: No    Rash: No  What treatments have you tried? none  Who do you live with? , son  Are you, or a household member, a healthcare worker or a ? No  Do you live in a nursing home, group home, or shelter? No  Do you have a way to get food/medications if quarantined? Yes, I have a friend or family member who can help me.                Histories:   Patient Active Problem List   Diagnosis     Lumbago     Carotene pigmentation of skin     CARDIOVASCULAR SCREENING; LDL GOAL LESS THAN 160     Health Care Home     Stress incontinence of urine     Cystocele, midline     Acquired hallux valgus of right foot     Diverticulosis of large intestine without hemorrhage     Pre-diabetes     Elevated C-reactive protein (CRP)     Elevated sed rate     Diabetes mellitus, type 2 (H)     Inflamed seborrheic keratosis     Past Surgical History:   Procedure Laterality Date     BLADDER SURGERY       CYSTOCELE REPAIR N/A      HYSTERECTOMY N/A     Supracerv[place]. [place] [facility] [place]      ORTHOPEDIC SURGERY Left -16    left foot bunionectomy     ORTHOPEDIC SURGERY      Foot surgery      RECTOCELE REPAIR N/A 2013     [facility] NONSPECIFIC PROCEDURE      Tubal ligation     [facility] NONSPECIFIC PROCEDURE      Lasik eye surgery     [facility] NONSPECIFIC PROCEDURE  3/07     L5-S1 discectomy       Social History     Tobacco Use     Smoking status: Former Smoker     Types: Cigarettes     Quit date: 3/12/1983     Years since quittin.7     Smokeless tobacco: Never Used   Substance Use Topics     Alcohol use: Yes     Alcohol/week: 0.0 - 2.5 standard drinks     Comment: socially     Family History   Problem Relation Age of Onset     Diabetes Mother      Cardiovascular Mother         a fib     Lipids Mother      Melanoma Father 77     Diabetes Brother      Neuromuscular Disease Brother          "severe progressive MS - dx'd            Reviewed and updated as needed this visit by Provider                 ROS:    A 10 system review was completed and is as noted in HPI and otherwise negative.            OBJECTIVE:                                                      Objective    There were no vitals taken for this visit.  Estimated body mass index is 26.8 kg/m  as calculated from the following:    Height as of 2/7/20: 1.638 m (5' 4.5\").    Weight as of 8/10/20: 71.9 kg (158 lb 9.6 oz).      Gen: Well appearing, NAD  Eyes: Clear  Resp: Breathing comfortably, NAD  Skin: Clear  Psych: normal mood and affect  Neuro: grossly normal              ASSESSMENT/PLAN:                                                        Leticia was seen today for fatigue.    Diagnoses and all orders for this visit:    Acute nonintractable headache, unspecified headache type: Discussed impossible to differentiate clinically between her symptoms and coronavirus.  The patient is nontoxic with mild symptoms and feels better today.  Encouraged home quarantine with precautions and she is awaiting saliva test she ordered.      Patient is agreement with the assessment and plan as outlined above.  All questions answered.  Red flag symptoms that should prompt emergent evaluation discussed and understood.      Chills            "

## 2020-12-28 DIAGNOSIS — R73.03 PRE-DIABETES: ICD-10-CM

## 2020-12-29 RX ORDER — METFORMIN HCL 500 MG
TABLET, EXTENDED RELEASE 24 HR ORAL
Qty: 180 TABLET | Refills: 0 | Status: SHIPPED | OUTPATIENT
Start: 2020-12-29 | End: 2021-05-06

## 2021-01-15 ENCOUNTER — HEALTH MAINTENANCE LETTER (OUTPATIENT)
Age: 61
End: 2021-01-15

## 2021-01-28 ENCOUNTER — AMBULATORY - HEALTHEAST (OUTPATIENT)
Dept: NURSING | Facility: CLINIC | Age: 61
End: 2021-01-28

## 2021-02-18 ENCOUNTER — AMBULATORY - HEALTHEAST (OUTPATIENT)
Dept: NURSING | Facility: CLINIC | Age: 61
End: 2021-02-18

## 2021-05-02 ENCOUNTER — HEALTH MAINTENANCE LETTER (OUTPATIENT)
Age: 61
End: 2021-05-02

## 2021-05-05 DIAGNOSIS — R73.03 PRE-DIABETES: ICD-10-CM

## 2021-05-05 NOTE — TELEPHONE ENCOUNTER
Metformin  Last VV 11/20/2020    Hemoglobin A1C   Date Value Ref Range Status   08/10/2020 6.5 (H) 4.8 - 5.6 % Final     Comment:              Prediabetes: 5.7 - 6.4           Diabetes: >6.4           Glycemic control for adults with diabetes: <7.0     02/07/2020 7.2 (H) 4.8 - 5.6 % Final     Comment:              Prediabetes: 5.7 - 6.4           Diabetes: >6.4           Glycemic control for adults with diabetes: <7.0     09/10/2019 6.5 (H) 4.8 - 5.6 % Final     Comment:              Prediabetes: 5.7 - 6.4           Diabetes: >6.4           Glycemic control for adults with diabetes: <7.0       Last Comprehensive Metabolic Panel:  Sodium   Date Value Ref Range Status   01/10/2020 143 134 - 144 mmol/L Final     Potassium   Date Value Ref Range Status   01/10/2020 5.0 3.5 - 5.2 mmol/L Final     Chloride   Date Value Ref Range Status   01/10/2020 101 96 - 106 mmol/L Final     Carbon Dioxide   Date Value Ref Range Status   03/08/2018 24 20 - 32 mmol/L Final     Anion Gap   Date Value Ref Range Status   03/08/2018 9 3 - 14 mmol/L Final     Glucose   Date Value Ref Range Status   01/10/2020 256 (H) 65 - 99 mg/dL Final     Urea Nitrogen   Date Value Ref Range Status   01/10/2020 15 8 - 27 mg/dL Final     BUN/Creatinine Ratio   Date Value Ref Range Status   01/10/2020 20 12 - 28 Final     Creatinine   Date Value Ref Range Status   01/10/2020 0.75 0.57 - 1.00 mg/dL Final     GFR Estimate   Date Value Ref Range Status   03/08/2018 >90 >60 mL/min/1.7m2 Final     Comment:     Non  GFR Calc     Calcium   Date Value Ref Range Status   01/10/2020 9.9 8.7 - 10.3 mg/dL Final     Bilirubin Total   Date Value Ref Range Status   01/10/2020 <0.2 0.0 - 1.2 mg/dL Final     Alkaline Phosphatase   Date Value Ref Range Status   01/10/2020 64 39 - 117 IU/L Final     ALT   Date Value Ref Range Status   01/10/2020 26 0 - 32 IU/L Final     AST   Date Value Ref Range Status   01/10/2020 23 0 - 40 IU/L Final

## 2021-05-06 RX ORDER — METFORMIN HCL 500 MG
TABLET, EXTENDED RELEASE 24 HR ORAL
Qty: 180 TABLET | Refills: 0 | Status: SHIPPED | OUTPATIENT
Start: 2021-05-06 | End: 2021-05-18

## 2021-05-10 DIAGNOSIS — K57.30 DIVERTICULAR DISEASE OF COLON: ICD-10-CM

## 2021-05-10 DIAGNOSIS — E11.69 TYPE 2 DIABETES MELLITUS WITH OTHER SPECIFIED COMPLICATION, WITHOUT LONG-TERM CURRENT USE OF INSULIN (H): Primary | ICD-10-CM

## 2021-05-10 LAB
% GRANULOCYTES: 48.3 % (ref 42.2–75.2)
HBA1C MFR BLD: 7.1 % (ref 4–6)
HCT VFR BLD AUTO: 41.2 % (ref 35–46)
HEMOGLOBIN: 13.9 G/DL (ref 11.8–15.5)
LYMPHOCYTES NFR BLD AUTO: 42.9 % (ref 20.5–51.1)
MCH RBC QN AUTO: 30.1 PG (ref 27–31)
MCHC RBC AUTO-ENTMCNC: 33.8 G/DL (ref 33–37)
MCV RBC AUTO: 89.2 FL (ref 80–100)
MONOCYTES NFR BLD AUTO: 8.8 % (ref 1.7–9.3)
PLATELET # BLD AUTO: 378 K/UL (ref 140–450)
RBC # BLD AUTO: 4.61 X10/CMM (ref 3.7–5.2)
WBC # BLD AUTO: 6.5 X10/CMM (ref 3.8–11)

## 2021-05-10 PROCEDURE — 83036 HEMOGLOBIN GLYCOSYLATED A1C: CPT | Performed by: FAMILY MEDICINE

## 2021-05-10 PROCEDURE — 82044 UR ALBUMIN SEMIQUANTITATIVE: CPT | Performed by: FAMILY MEDICINE

## 2021-05-10 PROCEDURE — 36415 COLL VENOUS BLD VENIPUNCTURE: CPT | Performed by: FAMILY MEDICINE

## 2021-05-10 PROCEDURE — 85025 COMPLETE CBC W/AUTO DIFF WBC: CPT | Performed by: FAMILY MEDICINE

## 2021-05-10 NOTE — LETTER
[facility] [address] [place]  [address]  Phone: [phone]    May 12, 2021      Leticia Tompkins  [address] [place] [address]              Dear Leticia,      Here is a copy of your labs, we will discuss them at your upcoming visit.       Sincerely,     Lc Michael M.D./Leticia Sloan  Results for orders placed or performed in visit on 05/10/21   Comp. Metabolic Panel (14) ([facility])     Status: Abnormal   Result Value Ref Range    Glucose 163 (H) 65 - 99 mg/dL    Urea Nitrogen 16 8 - 27 mg/dL    Creatinine 0.78 0.57 - 1.00 mg/dL    eGFR If NonAfricn Am 82 >59 mL/min/1.73    eGFR If Africn Am 94 >59 mL/min/1.73    BUN/Creatinine Ratio 21 12 - 28    Sodium 141 134 - 144 mmol/L    Potassium 4.3 3.5 - 5.2 mmol/L    Chloride 103 96 - 106 mmol/L    Total CO2 26 20 - 29 mmol/L    Calcium 9.4 8.7 - 10.3 mg/dL    Protein Total 7.0 6.0 - 8.5 g/dL    Albumin 4.4 3.8 - 4.8 g/dL    Globulin, Total 2.6 1.5 - 4.5 g/dL    A/G Ratio 1.7 1.2 - 2.2    Bilirubin Total 0.3 0.0 - 1.2 mg/dL    Alkaline Phosphatase 69 39 - 117 IU/L    AST 20 0 - 40 IU/L    ALT 16 0 - 32 IU/L    Narrative    Performed at:  01 - [facility] [address], [place], [place]  [id number]  : Yoshi Moreno MD, Phone:  [phone]   Lipid Panel ([facility])     Status: Abnormal   Result Value Ref Range    Cholesterol 236 (H) 100 - 199 mg/dL    Triglycerides 58 0 - 149 mg/dL    HDL Cholesterol 84 >39 mg/dL    VLDL Cholesterol Vitor 10 5 - 40 mg/dL    LDL Cholesterol Calculated 142 (H) 0 - 99 mg/dL    LDL/HDL Ratio 1.7 0.0 - 3.2 ratio    Narrative    Performed at:  01 - [facility] [address], [place], [place]  [id number]  : Yoshi Moreno MD, Phone:  [phone]   Hemoglobin A1C ([facility])     Status: Abnormal   Result Value Ref Range    Hemoglobin A1C 7.1 (A) 4.0 - 6.0 %   CBC with Diff/Plt ([facility])     Status: None   Result Value Ref Range    WBC x10/cmm 6.5  3.8 - 11.0 x10/cmm    % Lymphocytes 42.9 20.5 - 51.1 %    % Monocytes 8.8 1.7 - 9.3 %    % Granulocytes 48.3 42.2 - 75.2 %    RBC x10/cmm 4.61 3.7 - 5.2 x10/cmm    Hemoglobin 13.9 11.8 - 15.5 g/dl    Hematocrit 41.2 35 - 46 %    MCV 89.2 80 - 100 fL    MCH 30.1 27.0 - 31.0 pg    MCHC 33.8 33.0 - 37.0 g/dL    Platelet Count 378 140 - 450 K/uL   Microalbumin ([facility])     Status: None   Result Value Ref Range    Albumin mg/L 10 mg/L    Urine Creatinine Mg/Dl 50 mg/dL    A/C Ratio mg/g <30 mg/g    Interpretation normal    Vitamin B12 ([facility])     Status: None   Result Value Ref Range    Vitamin B12 596 232 - 1,245 pg/mL    Narrative    Performed at:  01 - [facility] [address], [place], [place]  [id number]  : Yoshi Moreno MD, Phone:  [phone]

## 2021-05-11 LAB
ALBUMIN SERPL-MCNC: 4.4 G/DL (ref 3.8–4.8)
ALBUMIN/GLOB SERPL: 1.7 {RATIO} (ref 1.2–2.2)
ALP SERPL-CCNC: 69 IU/L (ref 39–117)
ALT SERPL-CCNC: 16 IU/L (ref 0–32)
AST SERPL-CCNC: 20 IU/L (ref 0–40)
BILIRUB SERPL-MCNC: 0.3 MG/DL (ref 0–1.2)
BUN SERPL-MCNC: 16 MG/DL (ref 8–27)
BUN/CREATININE RATIO: 21 (ref 12–28)
CALCIUM SERPL-MCNC: 9.4 MG/DL (ref 8.7–10.3)
CHLORIDE SERPLBLD-SCNC: 103 MMOL/L (ref 96–106)
CHOLEST SERPL-MCNC: 236 MG/DL (ref 100–199)
CREAT SERPL-MCNC: 0.78 MG/DL (ref 0.57–1)
EGFR IF AFRICN AM: 94 ML/MIN/1.73
EGFR IF NONAFRICN AM: 82 ML/MIN/1.73
GLOBULIN, TOTAL: 2.6 G/DL (ref 1.5–4.5)
GLUCOSE SERPL-MCNC: 163 MG/DL (ref 65–99)
HDLC SERPL-MCNC: 84 MG/DL
LDL/HDL RATIO: 1.7 RATIO (ref 0–3.2)
LDLC SERPL CALC-MCNC: 142 MG/DL (ref 0–99)
POTASSIUM SERPL-SCNC: 4.3 MMOL/L (ref 3.5–5.2)
PROT SERPL-MCNC: 7 G/DL (ref 6–8.5)
SODIUM SERPL-SCNC: 141 MMOL/L (ref 134–144)
TOTAL CO2: 26 MMOL/L (ref 20–29)
TRIGL SERPL-MCNC: 58 MG/DL (ref 0–149)
VIT B12 SERPL-MCNC: 596 PG/ML (ref 232–1245)
VLDLC SERPL CALC-MCNC: 10 MG/DL (ref 5–40)

## 2021-05-12 LAB
A/C RATIO MG/G: <30 MG/G
ALBUMIN (URINE) MG/L: 10 MG/L
INTERPRETATION: NORMAL
URINE CREATININE MG/DL - QUEST: 50 MG/DL

## 2021-05-12 NOTE — RESULT ENCOUNTER NOTE
Dear Leticia,  Here is a copy of your labs, we will discuss them at your upcoming visit.  Lc Michael MD

## 2021-05-18 ENCOUNTER — OFFICE VISIT (OUTPATIENT)
Dept: FAMILY MEDICINE | Facility: CLINIC | Age: 61
End: 2021-05-18

## 2021-05-18 VITALS
OXYGEN SATURATION: 96 % | DIASTOLIC BLOOD PRESSURE: 72 MMHG | WEIGHT: 161.25 LBS | SYSTOLIC BLOOD PRESSURE: 108 MMHG | HEART RATE: 73 BPM | HEIGHT: 65 IN | RESPIRATION RATE: 16 BRPM | BODY MASS INDEX: 26.87 KG/M2

## 2021-05-18 DIAGNOSIS — F32.1 CURRENT MODERATE EPISODE OF MAJOR DEPRESSIVE DISORDER WITHOUT PRIOR EPISODE (H): ICD-10-CM

## 2021-05-18 DIAGNOSIS — E11.69 TYPE 2 DIABETES MELLITUS WITH OTHER SPECIFIED COMPLICATION, WITHOUT LONG-TERM CURRENT USE OF INSULIN (H): ICD-10-CM

## 2021-05-18 DIAGNOSIS — Z00.00 ROUTINE GENERAL MEDICAL EXAMINATION AT A HEALTH CARE FACILITY: Primary | ICD-10-CM

## 2021-05-18 DIAGNOSIS — I47.10 SUPRAVENTRICULAR TACHYCARDIA (H): ICD-10-CM

## 2021-05-18 PROBLEM — F39 MOOD DISORDER (H): Status: ACTIVE | Noted: 2021-05-18

## 2021-05-18 PROCEDURE — 99207 PR FOOT EXAM NO CHARGE: CPT | Mod: 25 | Performed by: FAMILY MEDICINE

## 2021-05-18 PROCEDURE — 99396 PREV VISIT EST AGE 40-64: CPT | Performed by: FAMILY MEDICINE

## 2021-05-18 PROCEDURE — 99213 OFFICE O/P EST LOW 20 MIN: CPT | Mod: 25 | Performed by: FAMILY MEDICINE

## 2021-05-18 RX ORDER — AMOXICILLIN 500 MG
CAPSULE ORAL
COMMUNITY
Start: 2020-08-26

## 2021-05-18 RX ORDER — METFORMIN HCL 500 MG
TABLET, EXTENDED RELEASE 24 HR ORAL
Qty: 270 TABLET | Refills: 3 | Status: SHIPPED | OUTPATIENT
Start: 2021-05-18 | End: 2021-07-13

## 2021-05-18 RX ORDER — ROSUVASTATIN CALCIUM 5 MG/1
5 TABLET, COATED ORAL DAILY
Qty: 90 TABLET | Refills: 3 | Status: SHIPPED | OUTPATIENT
Start: 2021-05-18 | End: 2022-04-28

## 2021-05-18 RX ORDER — LISINOPRIL 2.5 MG/1
5 TABLET ORAL DAILY
Qty: 90 TABLET | Refills: 3 | Status: SHIPPED | OUTPATIENT
Start: 2021-05-18 | End: 2021-07-13

## 2021-05-18 RX ORDER — CITALOPRAM HYDROBROMIDE 10 MG/1
10 TABLET ORAL DAILY
Qty: 30 TABLET | Refills: 3 | Status: SHIPPED | OUTPATIENT
Start: 2021-05-18 | End: 2021-08-09

## 2021-05-18 ASSESSMENT — MIFFLIN-ST. JEOR: SCORE: 1284.37

## 2021-05-18 ASSESSMENT — PATIENT HEALTH QUESTIONNAIRE - PHQ9: SUM OF ALL RESPONSES TO PHQ QUESTIONS 1-9: 13

## 2021-05-18 NOTE — PATIENT INSTRUCTIONS
"Thank you for coming in today!   If you receive a survey via My Chart, mail or phone, please let us know if there was anything you especially appreciated today or if there is any way we can improve our clinic. We value your input.     Likewise, we are working hard to attend to our digital reputation.    Please consider reviewing our clinic on Google and/or Facebook via the following links:    [email or web address]                 [email or web address]    We truly appreciate you taking the time to do this!    General Information:    Today you had your appointment with Lc Michael MD    I am in the clinic:  Monday and Friday mornings  Tuesday and Thursday afternoons    I am not in the office Wednesdays, non urgent calls received on Wednesday will be addressed when I am back in the office on Thursday.    If lab work was done today as part of your evaluation you will generally be contacted via My Chart, mail, or phone with the results within 1-5 days. If there is an alarming result we will contact you by phone. Lab results come back at varying times, I generally wait until all labs are resulted before making comments on results. Please note labs are automatically released to My Chart once available.    If you need refills please contact your pharmacist. They will send a refill request to me to review. Please allow 3 business days for us to process all refill requests.    Please call or send a medical message with any questions or concerns.    Thank you for choosing [facility].  We truly appreciate you trusting us with your medical care.    Your next visit with us should be scheduled for Follow up in 4 weeks.     Listed next are the medical health Maintenance items we are tracking for your care and when they are next due (or overdue!)  Our goal is 100% \"up to date.\" Keep this list handy to call and schedule what you are due for in the future!    Health " Maintenance Due   Topic Date Due     EYE EXAM  Never done     Pneumococcal Vaccine: Pediatrics (0 to 5 Years) and At-Risk Patients (6 to 64 Years) (1 of 2 - PPSV23) Never done     ZOSTER IMMUNIZATION (1 of 2) Never done       Sincerely,    Lc Michael MD    Preventive Health Recommendations  Female Ages 50 - 64    Yearly exam: See your health care provider every year in order to  o Review health changes.   o Discuss preventive care.    o Review your medicines if your doctor has prescribed any.      Get a Pap test every three years (unless you have an abnormal result and your provider advises testing more often).    If you get Pap tests with HPV test, you only need to test every 5 years, unless you have an abnormal result.     You do not need a Pap test if your uterus was removed (hysterectomy) and you have not had cancer.    You should be tested each year for STDs (sexually transmitted diseases) if you're at risk.     Have a mammogram every 1 to 2 years.    Have a colonoscopy at age 50, or have a yearly FIT test (stool test). These exams screen for colon cancer.      Have a cholesterol test every 5 years, or more often if advised.    Have a diabetes test (fasting glucose) every three years. If you are at risk for diabetes, you should have this test more often.     If you are at risk for osteoporosis (brittle bone disease), think about having a bone density scan (DEXA).    Shots: Get a flu shot each year. Get a tetanus shot every 10 years.    Nutrition:     Eat at least 5 servings of fruits and vegetables each day.    Eat whole-grain bread, whole-wheat pasta and brown rice instead of white grains and rice.    Get adequate Calcium and Vitamin D.     Lifestyle    Exercise at least 150 minutes a week (30 minutes a day, 5 days a week). This will help you control your weight and prevent disease.    Limit alcohol to one drink per day.    No smoking.     Wear sunscreen to prevent skin cancer.     See your dentist every  six months for an exam and cleaning.    See your eye doctor every 1 to 2 years.

## 2021-05-18 NOTE — PROGRESS NOTES
SUBJECTIVE:   CC: Leticia Tompkins is an 60 year old woman who presents for preventive health visit.       Patient has been advised of split billing requirements and indicates understanding:   Healthy Habits:    Do you get at least three servings of calcium containing foods daily (dairy, green leafy vegetables, etc.)? yes    Amount of exercise or daily activities, outside of work: 7 day(s) per week    Problems taking medications regularly No    Medication side effects: No    Have you had an eye exam in the past two years? yes    Do you see a dentist twice per year? yes    Do you have sleep apnea, excessive snoring or daytime drowsiness?yes, drowsiness          Today's PHQ-2 Score:   PHQ-2 (  [employer]) 2021   Q1: Little interest or pleasure in doing things 1 0   Q2: Feeling down, depressed or hopeless 1 0   PHQ-2 Score 2 0       Abuse: Current or Past(Physical, Sexual or Emotional)- No  Do you feel safe in your environment? Yes    Have you ever done Advance Care Planning? (For example, a Health Directive, POLST, or a discussion with a medical provider or your loved ones about your wishes): No, advance care planning information given to patient to review.  Patient plans to discuss their wishes with loved ones or provider.      Social History     Tobacco Use     Smoking status: Former Smoker     Types: Cigarettes     Quit date: 3/12/1983     Years since quittin.2     Smokeless tobacco: Never Used   Substance Use Topics     Alcohol use: Yes     Alcohol/week: 0.0 - 2.5 standard drinks     Comment: socially     If you drink alcohol do you typically have >3 drinks per day or >7 drinks per week? No                     Reviewed orders with patient.  Reviewed health maintenance and updated orders accordingly - Yes  Labs reviewed in EPIC    FSH-7: No flowsheet data found.    Mammogram Screening: Recommended mammography every 1-2 years with patient discussion and risk factor consideration  Pertinent  "mammograms are reviewed under the imaging tab.    Pertinent mammograms are reviewed under the imaging tab.  History of abnormal Pap smear: NO - age 30- 65 PAP every 3 years recommended     Reviewed and updated as needed this visit by clinical staff  Tobacco  Allergies  Meds              Reviewed and updated as needed this visit by Provider                Past Medical History:   Diagnosis Date     Depressive disorder, not elsewhere classified 1997    treated with Zoloft for 8 months     Lumbago     L5-S1 radiculopathy     Spider veins      Tachycardia, unspecified 2004    Single episode treated with Adenosine      Past Surgical History:   Procedure Laterality Date     BLADDER SURGERY       CYSTOCELE REPAIR N/A 0/2013     HYSTERECTOMY N/A 05/23/20014    Suprac[place]ical[place] [facility] [place]      ORTHOPEDIC SURGERY Left 4-19-16    left foot bunionectomy     ORTHOPEDIC SURGERY      Foot surgery      RECTOCELE REPAIR N/A 05/2013     [facility] NONSPECIFIC PROCEDURE  1991    Tubal ligation     [facility] NONSPECIFIC PROCEDURE      Lasik eye surgery     [facility] NONSPECIFIC PROCEDURE  3/07     L5-S1 discectomy       ROS:  CONSTITUTIONAL: NEGATIVE for fever, chills, change in weight  INTEGUMENTARY/SKIN: NEGATIVE for worrisome rashes, moles or lesions  EYES: NEGATIVE for vision changes or irritation  ENT: NEGATIVE for ear, mouth and throat problems  RESP: NEGATIVE for significant cough or SOB  BREAST: NEGATIVE for masses, tenderness or discharge  CV: NEGATIVE for chest pain, palpitations or peripheral edema  GI: NEGATIVE for nausea, abdominal pain, heartburn, or change in bowel habits  : NEGATIVE for unusual urinary or vaginal symptoms. No vaginal bleeding.  MUSCULOSKELETAL: NEGATIVE for significant arthralgias or myalgia  NEURO: NEGATIVE for weakness, dizziness or paresthesias  PSYCHIATRIC: NEGATIVE for changes in mood or affect     OBJECTIVE:   /72   Pulse 73   Resp 16   Ht 1.638 m (5' 4.5\")   Wt 73.1 kg (161 lb " 4 oz)   SpO2 96%   BMI 27.25 kg/m    EXAM:  GENERAL APPEARANCE: healthy, alert and no distress  EYES: Eyes grossly normal to inspection, PERRL and conjunctivae and sclerae normal  HENT: ear canals and TM's normal, nose and mouth without ulcers or lesions, oropharynx clear and oral mucous membranes moist  NECK: no adenopathy, no asymmetry, masses, or scars and thyroid normal to palpation  RESP: lungs clear to auscultation - no rales, rhonchi or wheezes  BREAST: normal without masses, tenderness or nipple discharge and no palpable axillary masses or adenopathy  CV: regular rate and rhythm, normal S1 S2, no S3 or S4, no murmur, click or rub, no peripheral edema and peripheral pulses strong  ABDOMEN: soft, nontender, no hepatosplenomegaly, no masses and bowel sounds normal  MS: no musculoskeletal defects are noted and gait is age appropriate without ataxia  SKIN: no suspicious lesions or rashes  NEURO: Normal strength and tone, sensory exam grossly normal, mentation intact and speech normal  PSYCH: mentation appears normal and affect normal/bright    Diagnostic Test Results:  Labs reviewed in Epic    ASSESSMENT/PLAN:   Leticia was seen today for physical.    Diagnoses and all orders for this visit:    Routine general medical examination at a health care facility    Type 2 diabetes mellitus with other specified complication, without long-term current use of insulin (H)  Reviewed her current DM management.   Discussed importance in compliance in all areas of diabetic control including diet, routine BS checks, absolute medication compliance, laboratory monitoring, and attending regular follow up appointments as ordered.  Reviewed that failure to comply with instructions regarding diabetes will lead to a greater chance of poor diabetic control and therefore a greater chance of diabetes related complications such as CAD, CVA, PVD, and retinopathy/neuropathy/nephropathy.  Next follow up will be in 1 months.  We today managed  "her prescriptions with refills ensured to ensure appropriate availability of current medications.    -     FOOT EXAM  -     metFORMIN (GLUCOPHAGE-XR) 500 MG 24 hr tablet; Take1 Tablet(500 MG) BY MOUTH in the morning and 2 tablets at bedtime with  MEALS  -     lisinopril (ZESTRIL) 2.5 MG tablet; Take 2 tablets (5 mg) by mouth daily  -     rosuvastatin (CRESTOR) 5 MG tablet; Take 1 tablet (5 mg) by mouth daily    Supraventricular tachycardia (H)  No recent issues    Current moderate episode of major depressive disorder without prior episode (H)  Spoke at length about mood disorders including suspected pathophysiology, role of neurotransmitters, and treatment options including medication options ( especially SSRIs that treat cause of sx) and counselling.  Tolerating current meds. We discussed ongoing management of her  medications and how we will refill the medications now and in the future.    -     citalopram (CELEXA) 10 MG tablet; Take 1 tablet (10 mg) by mouth daily        Patient has been advised of split billing requirements and indicates understanding: Yes  COUNSELING:   Reviewed preventive health counseling, as reflected in patient instructions       Regular exercise       Healthy diet/nutrition    Estimated body mass index is 27.25 kg/m  as calculated from the following:    Height as of this encounter: 1.638 m (5' 4.5\").    Weight as of this encounter: 73.1 kg (161 lb 4 oz).        She reports that she quit smoking about 38 years ago. Her smoking use included cigarettes. She has never used smokeless tobacco.      Counseling Resources:  ATP IV Guidelines  Pooled Cohorts Equation Calculator  Breast Cancer Risk Calculator  BRCA-Related Cancer Risk Assessment: FHS-7 Tool  FRAX Risk Assessment  ICSI Preventive Guidelines  Dietary Guidelines for Americans, 2010  USDA's MyPlate  ASA Prophylaxis  Lung CA Screening    Lc Michael MD  [facility]    Depression:  Has known history of depression.  Has " been on medication for this.  The patient does not report any significant side effects of this medication.  The prior symptoms leading to the original diagnosis and decision to start medication therapy are not yet optimal.     Appetite is stable.  Sleeping patterns are stable.  No reported thoughts of suicide or homicide.  Last PHQ-9 score on record=   PHQ-9 SCORE 12/3/2018 9/10/2019 4/23/2020 5/18/2021   PHQ-9 Total Score 11 4 3 13     [id number]  Diabetes  she  reports no new symptoms.  No significnat or regular episodes of hypo or hyperglycemia  Medication compliance: compliant most of the time  Diabetic diet compliance: compliant most of the time  Diabetic ROS: no polyuria or polydipsia, no chest pain, dyspnea or TIA's, no numbness, tingling or pain in extremities    Home blood sugar monitoring: are performed regularly, Review of patients self blood glucose monitoring shows fasting most always < 130 and post prandial average under 170.  As a direct cause of their history of diabetes they have the following Diabetic complications: none    Most  recent A1C: Smoking: BP:   The last 5 available [place]1C [place] [facility] [place] [facility]:  No components found for: [id number]     Lab Results   Component Value Date    A1C 7.1 05/10/2021    A1C 6.5 08/10/2020    A1C 7.2 02/07/2020    History   Smoking Status     Former Smoker     Types: Cigarettes     Quit date: 3/12/1983   Smokeless Tobacco     Never Used    BP Readings from Last 1 Encounters:   05/18/21 108/72        Kidney studies:  Creatinine   Date Value Ref Range Status   05/10/2021 0.78 0.57 - 1.00 mg/dL Final   01/10/2020 0.75 0.57 - 1.00 mg/dL Final   09/10/2019 0.63 0.57 - 1.00 mg/dL Final   ]    GFR Estimate   Date Value Ref Range Status   03/08/2018 >90 >60 mL/min/1.7m2 Final     Comment:     Non  GFR Calc                No components found for: MICORALBUMINLC      GFR Estimate If Black   Date Value Ref Range Status   03/08/2018 >90 >60  mL/min/1.7m2 Final     Comment:      GFR Calc     Medication (Note: This includes the hypertensive combination subclass to make sure to show all ACEI/ARB's.)     ACE Inhibitors       lisinopril (ZESTRIL) 2.5 MG tablet    Take 2 tablets (5 mg) by mouth daily               Statin and ASA:  Current Outpatient Medications   Medication     citalopram (CELEXA) 10 MG tablet     cod liver oil CAPS capsule     IBUPROFEN PO     lisinopril (ZESTRIL) 2.5 MG tablet     magnesium 250 MG tablet     metFORMIN (GLUCOPHAGE-XR) 500 MG 24 hr tablet     multivitamin w/minerals (MULTI-VITAMIN) tablet     Omega-3 Fatty Acids (FISH OIL) 1200 MG capsule     rosuvastatin (CRESTOR) 5 MG tablet     vitamin D3 (CHOLECALCIFEROL) 2000 units (50 mcg) tablet     No current facility-administered medications for this visit.

## 2021-06-14 ENCOUNTER — OFFICE VISIT (OUTPATIENT)
Dept: FAMILY MEDICINE | Facility: CLINIC | Age: 61
End: 2021-06-14

## 2021-06-14 VITALS
WEIGHT: 158.6 LBS | HEART RATE: 70 BPM | OXYGEN SATURATION: 98 % | BODY MASS INDEX: 26.8 KG/M2 | RESPIRATION RATE: 16 BRPM | SYSTOLIC BLOOD PRESSURE: 134 MMHG | DIASTOLIC BLOOD PRESSURE: 84 MMHG

## 2021-06-14 DIAGNOSIS — F32.1 CURRENT MODERATE EPISODE OF MAJOR DEPRESSIVE DISORDER WITHOUT PRIOR EPISODE (H): ICD-10-CM

## 2021-06-14 DIAGNOSIS — E11.69 TYPE 2 DIABETES MELLITUS WITH OTHER SPECIFIED COMPLICATION, WITHOUT LONG-TERM CURRENT USE OF INSULIN (H): ICD-10-CM

## 2021-06-14 DIAGNOSIS — Z23 NEED FOR 23-POLYVALENT PNEUMOCOCCAL POLYSACCHARIDE VACCINE: Primary | ICD-10-CM

## 2021-06-14 PROCEDURE — 93050 ART PRESSURE WAVEFORM ANALYS: CPT | Performed by: FAMILY MEDICINE

## 2021-06-14 PROCEDURE — 99213 OFFICE O/P EST LOW 20 MIN: CPT | Performed by: FAMILY MEDICINE

## 2021-06-14 ASSESSMENT — ANXIETY QUESTIONNAIRES
3. WORRYING TOO MUCH ABOUT DIFFERENT THINGS: NOT AT ALL
6. BECOMING EASILY ANNOYED OR IRRITABLE: SEVERAL DAYS
2. NOT BEING ABLE TO STOP OR CONTROL WORRYING: NOT AT ALL
5. BEING SO RESTLESS THAT IT IS HARD TO SIT STILL: NOT AT ALL
IF YOU CHECKED OFF ANY PROBLEMS ON THIS QUESTIONNAIRE, HOW DIFFICULT HAVE THESE PROBLEMS MADE IT FOR YOU TO DO YOUR WORK, TAKE CARE OF THINGS AT HOME, OR GET ALONG WITH OTHER PEOPLE: NOT DIFFICULT AT ALL
GAD7 TOTAL SCORE: 2
1. FEELING NERVOUS, ANXIOUS, OR ON EDGE: NOT AT ALL
7. FEELING AFRAID AS IF SOMETHING AWFUL MIGHT HAPPEN: SEVERAL DAYS

## 2021-06-14 ASSESSMENT — PATIENT HEALTH QUESTIONNAIRE - PHQ9
SUM OF ALL RESPONSES TO PHQ QUESTIONS 1-9: 11
5. POOR APPETITE OR OVEREATING: NOT AT ALL

## 2021-06-14 NOTE — PROGRESS NOTES
Depression:  Has known history of depression.  Has been on medication for this.  The patient does not report any significant side effects of this medication.  The prior symptoms leading to the original diagnosis and decision to start medication therapy are not yet optimal.     Appetite is stable.  Sleeping patterns are stable.  No reported thoughts of suicide or homicide.  Last PHQ-9 score on record=   PHQ-9 SCORE 12/3/2018 9/10/2019 4/23/2020 5/18/2021 6/14/2021   PHQ-9 Total Score 11 4 3 13 11     unable to tolerate the ACE inhibitor.    Blood sugars are not checked at home.    Statins are at small dose and likely the lightheadedness was from the lisinopril. Will hold for now...      Problem(s) Oriented visit      ROS:  General and Resp. completed and negative except as noted above     HISTORY:   reports current alcohol use.   reports that she quit smoking about 38 years ago. Her smoking use included cigarettes. She has never used smokeless tobacco.    Past Medical History:   Diagnosis Date     Depressive disorder, not elsewhere classified 1997     Lumbago      Spider veins      Tachycardia, unspecified 2004     Past Surgical History:   Procedure Laterality Date     BLADDER SURGERY       CYSTOCELE REPAIR N/A 0/2013     HYSTERECTOMY N/A 05/23/20014    [place]rac[place] [facility] [place]      [place]THOPEDIC SURGERY Left 4-19-16    left foot bunionectomy     ORTHOPEDIC SURGERY      Foot surgery      RECTOCELE REPAIR N/A 05/2013     [facility]SPEC[facility]ED[facility]  1991    Tubal ligation     [facility] NONSPECIFIC PROCEDURE      Lasik eye surgery     [facility] NONSPECIFIC PROCEDURE  3/07     L5-S1 discectomy       EXAM:  BP: 134/84[had one cup coffee[   Pulse: 70    Temp: Data Unavailable    Wt Readings from Last 2 Encounters:   06/14/21 71.9 kg (158 lb 9.6 oz)   05/18/21 73.1 kg (161 lb 4 oz)       BMI= Body mass index is 26.8 kg/m .    EXAM:  APPEARANCE: = Relaxed and in no distress      Assessment/Plan:  Leticia was seen  "today for hypertension, depression and medication problem.    Diagnoses and all orders for this visit:    Need for 23-polyvalent pneumococcal polysaccharide vaccine  -     PPSV23, IM/SUBQ (2+ YRS) - [id number]  -     VACCINE ADMINISTRATION, INITIAL    Type 2 diabetes mellitus with other specified complication, without long-term current use of insulin (H)  Unable to tolerate the ACE may need to revisit in the future  -     FL ART PRESS WAVEFORM ANALYS CENTRAL ART PRESSURE    Current moderate episode of major depressive disorder without prior episode (H)  Recheck in a month  Not much helped after 3 months..  wellbutrin or losing a stressful job resulted in improvement in 2018....  -     DEPRESSION ACTION PLAN (DAP)        COUNSELING:   reports that she quit smoking about 38 years ago. Her smoking use included cigarettes. She has never used smokeless tobacco.  Tobacco Cessation Action Plan: Information offered: Patient not interested at this time  Estimated body mass index is 26.8 kg/m  as calculated from the following:    Height as of 5/18/21: 1.638 m (5' 4.5\").    Weight as of this encounter: 71.9 kg (158 lb 9.6 oz).       Appropriate preventive services were discussed with this patient, including applicable screening as appropriate for cardiovascular disease, diabetes, osteopenia/osteoporosis, and glaucoma.  As appropriate for age/gender, discussed screening for colorectal cancer, prostate cancer, breast cancer, and cervical cancer. Checklist reviewing preventive services available has been given to the patient.    Reviewed patients plan of care and provided an AVS. The Basic Care Plan (routine screening as documented in Health Maintenance) for Leticia meets the Care Plan requirement. This Care Plan has been established and reviewed with the  Patient.      The following health maintenance items are reviewed in Epic and correct as of today:  Health Maintenance   Topic Date Due     EYE EXAM  Never done     Pneumococcal " Vaccine: Pediatrics (0 to 5 Years) and At-Risk Patients (6 to 64 Years) (1 of 2 - PPSV23) Never done     ZOSTER IMMUNIZATION (1 of 2) Never done     PAP  12/03/2021     A1C  08/10/2021     PHQ-9  12/14/2021     BMP  05/10/2022     LIPID  05/10/2022     MICROALBUMIN  05/10/2022     PREVENTIVE CARE VISIT  05/18/2022     DIABETIC FOOT EXAM  05/18/2022     MAMMO SCREENING  10/27/2022     COLORECTAL CANCER SCREENING  03/26/2023     DTAP/TDAP/TD IMMUNIZATION (3 - Td) 04/05/2026     ADVANCE CARE PLANNING  05/18/2026     HEPATITIS C SCREENING  Completed     HIV SCREENING  Completed     DEPRESSION ACTION PLAN  Completed     INFLUENZA VACCINE  Completed     COVID-19 Vaccine  Completed     IPV IMMUNIZATION  Aged Out     MENINGITIS IMMUNIZATION  Aged Out       Lc Michael  [facility]  For any issues my office # is [phone]

## 2021-06-15 ASSESSMENT — ANXIETY QUESTIONNAIRES: GAD7 TOTAL SCORE: 2

## 2021-07-13 ENCOUNTER — OFFICE VISIT (OUTPATIENT)
Dept: FAMILY MEDICINE | Facility: CLINIC | Age: 61
End: 2021-07-13

## 2021-07-13 VITALS
DIASTOLIC BLOOD PRESSURE: 82 MMHG | BODY MASS INDEX: 26.42 KG/M2 | OXYGEN SATURATION: 95 % | SYSTOLIC BLOOD PRESSURE: 130 MMHG | HEIGHT: 65 IN | HEART RATE: 79 BPM | RESPIRATION RATE: 16 BRPM | WEIGHT: 158.56 LBS

## 2021-07-13 DIAGNOSIS — I10 ESSENTIAL HYPERTENSION: Primary | ICD-10-CM

## 2021-07-13 DIAGNOSIS — R53.83 FATIGUE, UNSPECIFIED TYPE: ICD-10-CM

## 2021-07-13 DIAGNOSIS — E11.69 TYPE 2 DIABETES MELLITUS WITH OTHER SPECIFIED COMPLICATION, WITHOUT LONG-TERM CURRENT USE OF INSULIN (H): ICD-10-CM

## 2021-07-13 DIAGNOSIS — R55 NEAR SYNCOPE: ICD-10-CM

## 2021-07-13 LAB
% GRANULOCYTES: 50.4 % (ref 42.2–75.2)
HBA1C MFR BLD: 6.8 % (ref 4–6)
HCT VFR BLD AUTO: 41 % (ref 35–46)
HEMOGLOBIN: 14 G/DL (ref 11.8–15.5)
LYMPHOCYTES NFR BLD AUTO: 41.8 % (ref 20.5–51.1)
MCH RBC QN AUTO: 30.4 PG (ref 27–31)
MCHC RBC AUTO-ENTMCNC: 34.3 G/DL (ref 33–37)
MCV RBC AUTO: 88.6 FL (ref 80–100)
MONOCYTES NFR BLD AUTO: 7.8 % (ref 1.7–9.3)
PLATELET # BLD AUTO: 388 K/UL (ref 140–450)
RBC # BLD AUTO: 4.63 X10/CMM (ref 3.7–5.2)
WBC # BLD AUTO: 7.9 X10/CMM (ref 3.8–11)

## 2021-07-13 PROCEDURE — 83036 HEMOGLOBIN GLYCOSYLATED A1C: CPT | Performed by: FAMILY MEDICINE

## 2021-07-13 PROCEDURE — 99214 OFFICE O/P EST MOD 30 MIN: CPT | Performed by: FAMILY MEDICINE

## 2021-07-13 PROCEDURE — 36415 COLL VENOUS BLD VENIPUNCTURE: CPT | Performed by: FAMILY MEDICINE

## 2021-07-13 PROCEDURE — 85025 COMPLETE CBC W/AUTO DIFF WBC: CPT | Performed by: FAMILY MEDICINE

## 2021-07-13 RX ORDER — METFORMIN HCL 500 MG
TABLET, EXTENDED RELEASE 24 HR ORAL
Qty: 270 TABLET | Refills: 3 | Status: SHIPPED | OUTPATIENT
Start: 2021-07-13 | End: 2022-07-08

## 2021-07-13 ASSESSMENT — MIFFLIN-ST. JEOR: SCORE: 1272.17

## 2021-07-13 NOTE — LETTER
[facility] [address] [place]  [address]  Phone: [phone]    July 19, 2021      Leticia Tompkins  [address] [facility] [address]            Dear Leticia,   Here is a copy of your labs, we will discuss them at your upcoming visit.   Lc Michael MD        Results for orders placed or performed in visit on 07/13/21   CBC with Diff/Plt ([facility])     Status: None   Result Value Ref Range    WBC x10/cmm 7.9 3.8 - 11.0 x10/cmm    % Lymphocytes 41.8 20.5 - 51.1 %    % Monocytes 7.8 1.7 - 9.3 %    % Granulocytes 50.4 42.2 - 75.2 %    RBC x10/cmm 4.63 3.7 - 5.2 x10/cmm    Hemoglobin 14.0 11.8 - 15.5 g/dl    Hematocrit 41.0 35 - 46 %    MCV 88.6 80 - 100 fL    MCH 30.4 27.0 - 31.0 pg    MCHC 34.3 33.0 - 37.0 g/dL    Platelet Count 388 140 - 450 K/uL   Hemoglobin A1C ([facility])     Status: Abnormal   Result Value Ref Range    Hemoglobin A1C 6.8 (A) 4.0 - 6.0 %   TSH ([facility])     Status: None   Result Value Ref Range    TSH 1.320 0.450 - 4.500 uIU/mL    Narrative    Performed at:  01 - [facility] [address], [place], [place]  [id number]  : Yoshi Moreno MD, Phone:  [phone]

## 2021-07-13 NOTE — PROGRESS NOTES
Problem(s) Oriented visit        SUBJECTIVE:                                                    Leticia Tompkins is a 60 year old female who presents to clinic today for the following health issues :      1. Essential hypertension  Essential Hypertension   Remains well controlled when checked out of clinic.   she has not experienced any significant side effects from medications for hypertension.    NO active cardiac complaints or symptoms with exercise.  Current medications for treatment:      Reviewed last 6 BP readings in chart:  BP Readings from Last 6 Encounters:   07/13/21 130/82   06/14/21 134/84   05/18/21 108/72   08/10/20 (!) 136/94   04/23/20 (!) 120/94   02/07/20 120/78           2. Type 2 diabetes mellitus with other specified complication, without long-term current use of insulin (H)  Last a1c up  Now having strange symptoms of sudden feintness without passing out,  Did for 4 minutes once. Post biking  - metFORMIN (GLUCOPHAGE-XR) 500 MG 24 hr tablet; Take1 Tablet(500 MG) BY MOUTH in the morning and 2 tablets at bedtime with  MEALS  Dispense: 270 tablet; Refill: 3  - Med Therapy Management Referral  - Hemoglobin A1C ([facility])  - TSH ([facility])    3. Fatigue, unspecified type  Reports nonspecific fatigue of a generalized nature for the last few months.  Denies intestinal symptoms (i.e. No diarrhea, no constipation, no irregular BMs), Denies chest pain, shortness of breath, or dyspnea on exertion.  Denies fevers, unintended weight loss, unexplained abdominal pain, unexplained joint or muscle pain,  recent travels, or  history of autoimmune disease.   The patient does not feel that they are aware of any specific cause for this fatigue.     - TSH ([facility])  - Vanillylmandelic Acid 24hr UR ([facility])    4. Near syncope  Always standing up, stairs not a problem.  - CBC with Diff/Plt ([facility])  - TSH ([facility])  - Vanillylmandelic Acid 24hr UR ([facility])       Problem list, Medication list, Allergies, and  Medical/Social/Surgical histories reviewed in [facility] and updated as appropriate.   Additional history: as documented    ROS:  General and Resp. completed and negative except as noted above    Histories:   Patient Active Problem List   Diagnosis     Lumbago     Carotene pigmentation of skin     CARDIOVASCULAR SCREENING; LDL GOAL LESS THAN 160     Health Care Home     Stress incontinence of urine     Cystocele, midline     Acquired hallux valgus of right foot     Diverticulosis of large intestine without hemorrhage     Pre-diabetes     Elevated C-reactive protein (CRP)     Elevated sed rate     Type 2 diabetes mellitus with other specified complication, without long-term current use of insulin (H)     Inflamed seborrheic keratosis     Mood disorder (H)     Supraventricular tachycardia (H)     Past Surgical History:   Procedure Laterality Date     BLADDER SURGERY       CYSTOCELE REPAIR N/A      HYSTERECTOMY N/A     Supracervical. [place] [facility] [place]      ORTHOPEDIC SURGERY Left 14    left foot bunionectomy     ORTHOPEDIC SURGERY      Foot surgery      RECTOCELE REPAIR N/A 2013     [facility] NONSPECIFIC PROCEDURE      Tubal ligation     [facility] NONSPECIFIC PROCEDURE      Lasik eye surgery     [facility] NONSPECIFIC PROCEDURE  3/07     L5-S1 discectomy       Social History     Tobacco Use     Smoking status: Former Smoker     Types: Cigarettes     Quit date: 3/12/1983     Years since quittin.3     Smokeless tobacco: Never Used   Substance Use Topics     Alcohol use: Yes     Alcohol/week: 0.0 - 2.5 standard drinks     Comment: socially     Family History   Problem Relation Age of Onset     Diabetes Mother      Cardiovascular Mother         a fib     Lipids Mother      Cancer Mother      Melanoma Father 77     Cancer Father      Diabetes Brother      Neuromuscular Disease Brother         severe progressive MS - dx'd            OBJECTIVE:                                                    BP  "130/82   Pulse 79   Resp 16   Ht 1.638 m (5' 4.5\")   Wt 71.9 kg (158 lb 9 oz)   SpO2 95%   BMI 26.80 kg/m    Body mass index is 26.8 kg/m .   APPEARANCE: = Relaxed and in no distress  Conj/Eyelids = noninjected and lids and lashes are without inflammation  PERRLA/Irises = Pupils Round Reactive to Light and Irisis without inflammation  Neck = No anterior or posterior adenopathy appreciated.  Thyroid = Not enlarged and no masses felt  Resp effort = Calm regular breathing  Breath Sounds = Good air movement with no rales or rhonchi in any lung fields  Heart Rate, Rhythm, & sounds (no Murm)  = Regular rate and rhythm with no S3, S4, or murmur appreciated.  Carotid Art's = Pulses full and equal and no bruits appreciated  Abdomen = Soft, nontender, no masses, & bowel sounds in all quadrants  Liver/Spleen = Normal span and no splenomegaly noted  Digits and Nails = FROM in all finger joints, no nail dystrophy  Ext (edema) = No pretibial edema noted or elsewhere  Musculsktl =  Strength and ROM of major joints are within normal limits  SKIN = absent significant rashes or lesions   Recent/Remote Memory = Alert and Oriented x 3  Mood/Affect = Cooperative and interested     ASSESSMENT/PLAN:                                                        Leticia was seen today for dizziness and recheck medication.    Diagnoses and all orders for this visit:    Essential hypertension  -     Cancel: [place] ART PRESS WAVEFORM ANALYS CENTRAL ART PRESSURE    Type 2 diabetes mellitus with other specified complication, without long-term current use of insulin (H)  -     metFORMIN (GLUCOPHAGE-XR) 500 MG 24 hr tablet; Take1 Tablet(500 MG) BY MOUTH in the morning and 2 tablets at bedtime with  MEALS  -     Med Therapy Management Referral  -     Hemoglobin A1C ([facility])  -     TSH ([facility])    Fatigue, unspecified type    -     TSH ([facility])  -     Vanillylmandelic Acid 24hr UR ([facility])    Near syncope  Interesting confusing picture could be DM, " metabolic, doubt cardiac work up from last year reviewed.  -     CBC with Diff/Plt ([facility])  -     TSH ([facility])  -     Vanillylmandelic Acid 24hr UR ([facility])        Work on weight loss  Regular exercise  There are no Patient Instructions on file for this visit.    The following health maintenance items are reviewed in Epic and correct as of today:  Health Maintenance   Topic Date Due     EYE EXAM  Never done     Pneumococcal Vaccine: Pediatrics (0 to 5 Years) and At-Risk Patients (6 to 64 Years) (1 of 2 - PPSV23) Never done     ZOSTER IMMUNIZATION (1 of 2) Never done     PAP  12/03/2021     A1C  08/10/2021     INFLUENZA VACCINE (1) 09/01/2021     PHQ-9  12/14/2021     BMP  05/10/2022     LIPID  05/10/2022     MICROALBUMIN  05/10/2022     PREVENTIVE CARE VISIT  05/18/2022     DIABETIC FOOT EXAM  05/18/2022     MAMMO SCREENING  10/27/2022     COLORECTAL CANCER SCREENING  03/26/2023     DTAP/TDAP/TD IMMUNIZATION (3 - Td or Tdap) 04/05/2026     ADVANCE CARE PLANNING  05/18/2026     HEPATITIS C SCREENING  Completed     HIV SCREENING  Completed     DEPRESSION ACTION PLAN  Completed     COVID-19 Vaccine  Completed     IPV IMMUNIZATION  Aged Out     MENINGITIS IMMUNIZATION  Aged Out       Lc Michael MD  [facility]  Family Practice  [facility]  [phone]    For any issues my office # is [phone]

## 2021-07-14 ENCOUNTER — TELEPHONE (OUTPATIENT)
Dept: FAMILY MEDICINE | Facility: CLINIC | Age: 61
End: 2021-07-14

## 2021-07-14 LAB — TSH BLD-ACNC: 1.32 UIU/ML (ref 0.45–4.5)

## 2021-07-14 NOTE — TELEPHONE ENCOUNTER
MTM referral from: [facility] visit (referral by provider)    MTM referral outreach attempt #2 on July 14, 2021 at 1:48 PM      Outcome: Patient not reachable after several attempts, will route to MTM Pharmacist/Provider as an FYI. Thank you for the referral.    Nikhil Barton, MTM coordinator

## 2021-07-19 ENCOUNTER — VIRTUAL VISIT (OUTPATIENT)
Dept: PHARMACY | Facility: PHYSICIAN GROUP | Age: 61
End: 2021-07-19
Attending: FAMILY MEDICINE
Payer: COMMERCIAL

## 2021-07-19 DIAGNOSIS — R42 DIZZINESS: ICD-10-CM

## 2021-07-19 DIAGNOSIS — E11.69 TYPE 2 DIABETES MELLITUS WITH OTHER SPECIFIED COMPLICATION, WITHOUT LONG-TERM CURRENT USE OF INSULIN (H): Primary | ICD-10-CM

## 2021-07-19 DIAGNOSIS — F39 MOOD DISORDER (H): ICD-10-CM

## 2021-07-19 DIAGNOSIS — Z78.9 TAKES DIETARY SUPPLEMENTS: ICD-10-CM

## 2021-07-19 PROCEDURE — 99605 MTMS BY PHARM NP 15 MIN: CPT | Performed by: PHARMACIST

## 2021-07-19 NOTE — PATIENT INSTRUCTIONS
Recommendations from today's MTM visit:                                                    MTM (medication therapy management) is a service provided by a clinical pharmacist designed to help you get the most of out of your medicines.   Today we reviewed what your medicines are for, how to know if they are working, that your medicines are safe and how to make your medicine regimen as easy as possible.        1. Continue plan to taper off citalopram for now.     2. Could consider alternative antidepressant such as sertraline as a well tolerated selective serotonin reuptake inhibitor or consider alternative mechanism of action using Venlafaxine.     3. Discussion around aspirin 81mg based on varied recommendations of the guidelines.        Follow-up: Return in 3 weeks (on 8/9/2021) for Phone call with MTM- 8:30AM.    It was great to speak with you today.  I value your experience and would be very thankful for your time with providing feedback on our clinic survey. You may receive a survey via email or text message in the next few days.     To schedule another MTM appointment, please call the clinic directly or you may call the MTM scheduling line at [phone] or toll-free at [phone].     My Clinical Pharmacist's contact information:                                                      Please feel free to contact me with any questions or concerns you have.      Ema Mcleod, Pharm.D, [facility]  Medication Therapy Management Pharmacist  [phone]

## 2021-07-19 NOTE — PROGRESS NOTES
Medication Therapy Management (MTM) Encounter    ASSESSMENT:                            Medication Adherence/Access: No issues identified    Dizziness: ongoing symptoms being worked up, feel may be related to citalopram initiation in addition to the lisinopril however based on last year issues with syncope may need further work up as well. Start with elimination of the antidepressant and see if symptoms improve. Consider initiating aspiring given age >50, diabetes and hyperlipidemia - ADA would recommend aspirin 81mg daily. ACC/AHA, USPSTF and ACCP CHEST guidelines base primary prevention aspirin use on ASCVD risk score >10%, which she is under. Further discussion at follow up recommended.      Mood disorder:  Recommend alternative antidepressant to help with depressed mood and fatigue, however due to symptoms will taper off the current selective serotonin reuptake inhibitor and then re-evaluate.     Type 2 Diabetes: Patient is meeting A1c goal of < 7%. Patient would benefit from no changes at this time but if further work up is needed to evaluate dizziness could consider CGM.    Supplements: Stable.      PLAN:                            1. Continue plan to taper off citalopram for now.     2. Could consider alternative antidepressant such as sertraline as a well tolerated selective serotonin reuptake inhibitor or consider alternative mechanism of action using Venlafaxine.     3. Discussion around aspirin 81mg based on varied recommendations of the guidelines.       Follow-up: Return in 3 weeks (on 8/9/2021) for Phone call with MTM- 8:30AM.    SUBJECTIVE/OBJECTIVE:                          Leticia Tompkins is a 60 year old female called for an initial visit. She was referred to me from .      Reason for visit: Dizziness, has been ongoing for several months.     Allergies/ADRs: Reviewed in chart  Tobacco: She reports that she quit smoking about 38 years ago. Her smoking use included cigarettes. She has never used  "smokeless tobacco.  Alcohol: Less than 1 beverages / week  Past Medical History: Reviewed in chart      Medication Adherence/Access: no issues reported    Dizziness: reports this started after going on lisinopril, metformin increase, rosuvastatin and citalopram earlier in 2021. She stopped lisinopril after her first issues with dizziness, but subsequently has had 2 additional episodes where she has felt very faint. These both happened after physical activity, outside in warm temperatures- states not dehydrated.     Has now been working off citalopram to see if this helps.     Chart review shows syncope episode in Jan 2020 that was worked up by cardiology and ziopatch at that time showed only a few episodes of SVT, no other findings. Does have remote history of an episode of afib as well noted in the chart.     Mood disorder:  Current medications include: 5mg citalopram daily since last Tuesday- Friday, then every other night with 5mg daily.   Depression symptoms, \"feeling like 50% of herself\", fatigue/foggy feeling.   Would be fine not taking an antidepressant and wants to try off this to see if symptoms improve.     Reports trial of bupropion in the past but it wasn't helpful. Was given 150mg daily with titration to 300mg in 2012, then again trial in 0897-1940.  Denies issues with anxiety.     PHQ-9 SCORE 4/23/2020 5/18/2021 6/14/2021   PHQ-9 Total Score 3 13 11       Type 2 Diabetes:  Currently taking metformin ER 500mg 3 daily - splitting up. Patient is not experiencing side effects.  Blood sugar monitoring: never. Ranges (patient reported): NA  Symptoms of low blood sugar?  dizzy  Symptoms of high blood sugar? fatigue  Aspirin: Not taking at this time.     The 10-year ASCVD risk score (Viviennejackelin YORK Jr., et al., 2013) is: 7%    Values used to calculate the score:      Age: 60 years      Sex: Female      Is Non- : No      Diabetic: Yes      Tobacco smoker: No      Systolic Blood Pressure: 130 " mmHg      Is BP treated: No      HDL Cholesterol: 84 mg/dL      Total Cholesterol: 236 mg/dL    Statin: Yes: rosuvastatin   ACEi/ARB: no, due to dizziness.    Lab Results   Component Value Date    A1C 6.8 07/13/2021    A1C 7.1 05/10/2021    A1C 6.5 08/10/2020    A1C 7.2 02/07/2020    A1C 6.5 09/10/2019     Supplements: Currently taking cod liver oil,fish oil, vitamin D 2000, magnesium daily, MV daily. No reported issues at this time.   Patient is not interested in stopping/changing supplements.       Today's Vitals: There were no vitals taken for this visit.  ----------------    I spent 20 minutes with this patient today. All changes were made via collaborative practice agreement with . A copy of the visit note was provided to the patient's primary care provider.    The patient was sent via [email or web address] a summary of these recommendations.     Ema Mcleod, Pharm.D, [facility]  Medication Therapy Management Pharmacist  [phone]    Telemedicine Visit Details  Type of service:  Telephone visit  Start Time: 1:03 PM  End Time: 1:23 PM  Originating Location (patient location): Home  Distant Location (provider location):  [facility]     Medication Therapy Recommendations  No medication therapy recommendations to display

## 2021-07-22 DIAGNOSIS — R53.83 FATIGUE, UNSPECIFIED TYPE: ICD-10-CM

## 2021-07-22 DIAGNOSIS — R55 NEAR SYNCOPE: ICD-10-CM

## 2021-07-28 LAB
VMA, URINE, 24HR: 3.3 MG/24 HR (ref 0–7.5)
VMA, URINE: 1.8 MG/L

## 2021-08-09 ENCOUNTER — VIRTUAL VISIT (OUTPATIENT)
Dept: PHARMACY | Facility: PHYSICIAN GROUP | Age: 61
End: 2021-08-09
Payer: COMMERCIAL

## 2021-08-09 DIAGNOSIS — E11.69 TYPE 2 DIABETES MELLITUS WITH OTHER SPECIFIED COMPLICATION, WITHOUT LONG-TERM CURRENT USE OF INSULIN (H): ICD-10-CM

## 2021-08-09 DIAGNOSIS — F39 MOOD DISORDER (H): ICD-10-CM

## 2021-08-09 DIAGNOSIS — R42 DIZZINESS: Primary | ICD-10-CM

## 2021-08-09 PROCEDURE — 99606 MTMS BY PHARM EST 15 MIN: CPT | Performed by: PHARMACIST

## 2021-08-09 PROCEDURE — 99607 MTMS BY PHARM ADDL 15 MIN: CPT | Performed by: PHARMACIST

## 2021-08-09 NOTE — PROGRESS NOTES
Medication Therapy Management (MTM) Encounter    ASSESSMENT:                            Medication Adherence/Access: No issues identified    Dizziness:  Improved.    Mood disorder:  Worsening depression symptoms since stopping citalopram, due to success in the past with sertraline and thought to tolerate well in the past, would recommend re-trial of this medication. Reviewed appropriate use, benefits, risks and monitoring.    Type 2 Diabetes: Discussion around aspirin for primary prevention with her today=  ADA would recommend aspirin 81mg daily. ACC/AHA, USPSTF and ACCP CHEST guidelines base primary prevention aspirin use on ASCVD risk score >10%, which she is under. She would prefer to not add aspirin at this time.     PLAN:                            1. Sertaline 50mg - start 1/2 tablet daily in the morning for 3 days then increase to full tablet once daily in the morning.       Follow-up: Return in about 2 weeks (around 8/23/2021) for mychart or call with update for MTM.    SUBJECTIVE/OBJECTIVE:                          Leticia Tompkins is a 60 year old female called for a follow-up visit. She was referred to me from Dr. Michael.  Today's visit is a follow-up MTM visit from 7/19     Reason for visit: Med follow up.    Allergies/ADRs: Reviewed in chart  Past Medical History: Reviewed in chart  Tobacco: She reports that she quit smoking about 38 years ago. Her smoking use included cigarettes. She has never used smokeless tobacco.  Alcohol: Less than 1 beverages / week    Medication Adherence/Access: no issues reported    Dizziness: reports this started after going on lisinopril, metformin increase, rosuvastatin and citalopram earlier in 2021. She stopped lisinopril after her first issues with dizziness, but subsequently has had 2 additional episodes where she has felt very faint. These both happened after physical activity, outside in warm temperatures- states not dehydrated.     Has tapered off citalopram to see if  this helps. Has been off this for 3 weeks and no fainting spells since.   Chart review shows syncope episode in Jan 2020 that was worked up by cardiology and nuno at that time showed only a few episodes of SVT, no other findings. Does have remote history of an episode of afib as well noted in the chart.     Mood disorder:  Current medications include: no current medication, finished tapering off citalopram.   Has been off citalopram for about 3 weeks now.   No SI, but some hopelessness. Isolating more, canceling social events etc.   Severe depression in 1997, took sertraline for 10 months and felt that it worked.   Not sure what family members are taking for their antidepressants.   Preferred not taking an antidepressant last visit and wanted to try off this to see if symptoms improved, no syncope spells since stopping the medication. Is wanting to restart something for mood now due to changes in overall mood.     Reports trial of bupropion in the past but it wasn't helpful. Was given 150mg daily with titration to 300mg in 2012, then again trial in [id number]. Denies issues with anxiety.     PHQ-9 SCORE 4/23/2020 5/18/2021 6/14/2021   PHQ-9 Total Score 3 13 11       Type 2 Diabetes:  Currently taking metformin ER 500mg 3 daily - splitting up. Patient is not experiencing side effects.  Blood sugar monitoring: never. Ranges (patient reported): NA  Symptoms of low blood sugar?  dizzy  Symptoms of high blood sugar? fatigue  Aspirin: Not taking at this time.     The 10-year ASCVD risk score (Vivienne YORK Jr., et al., 2013) is: 7%    Values used to calculate the score:      Age: 60 years      Sex: Female      Is Non- : No      Diabetic: Yes      Tobacco smoker: No      Systolic Blood Pressure: 130 mmHg      Is BP treated: No      HDL Cholesterol: 84 mg/dL      Total Cholesterol: 236 mg/dL    Statin: Yes: rosuvastatin   ACEi/ARB: no, due to dizziness.    Lab Results   Component Value Date    A1C 6.8  07/13/2021    A1C 7.1 05/10/2021    A1C 6.5 08/10/2020    A1C 7.2 02/07/2020    A1C 6.5 09/10/2019       Today's Vitals: There were no vitals taken for this visit.  ----------------    I spent 17 minutes with this patient today. All changes were made via collaborative practice agreement with Dr. Michael. A copy of the visit note was provided to the patient's primary care provider.    The patient was sent via [employer] a summary of these recommendations.     Ema Mcleod, Pharm.D, [facility]  Medication Therapy Management Pharmacist  [phone]      Telemedicine Visit Details  Type of service:  Telephone visit  Start Time: 8:33 AM  End Time: 8:50 AM  Originating Location (patient location): Home  Distant Location (provider location):  [facility]     Medication Therapy Recommendations  Mood disorder (H)    Current Medication: sertraline (ZOLOFT) 50 MG tablet   Rationale: Untreated condition - Needs additional medication therapy - Indication   Recommendation: Start Medication - sertraline 50 MG tablet - 1/2 tab daily for 3 days then 1 tab daily   Status: Accepted per CPA

## 2021-08-09 NOTE — PATIENT INSTRUCTIONS
Recommendations from today's MTM visit:                                                       1. Sertaline 50mg - start 1/2 tablet daily in the morning for 3 days then increase to full tablet once daily in the morning.       Follow-up: Return in about 2 weeks (around 8/23/2021) for mychart or call with update for MTM.    It was great to speak with you today.  I value your experience and would be very thankful for your time with providing feedback on our clinic survey. You may receive a survey via email or text message in the next few days.     To schedule another MTM appointment, please call the clinic directly or you may call the MTM scheduling line at [phone] or toll-free at [phone].     My Clinical Pharmacist's contact information:                                                      Please feel free to contact me with any questions or concerns you have.      Ema Mcleod, Pharm.D, [facility]  Medication Therapy Management Pharmacist  [phone]

## 2021-10-07 DIAGNOSIS — F39 MOOD DISORDER (H): ICD-10-CM

## 2021-10-07 NOTE — TELEPHONE ENCOUNTER
sertraline (ZOLOFT) 50 MG    LOV - 7/13/21    Last labs 7/13/21  Due for med check appt soon    PHQ 4/23/2020 5/18/2021 6/14/2021   PHQ-9 Total Score 3 13 11   Q9: Thoughts of better off dead/self-harm past 2 weeks Not at all Not at all Not at all

## 2021-10-13 ENCOUNTER — OFFICE VISIT (OUTPATIENT)
Dept: FAMILY MEDICINE | Facility: CLINIC | Age: 61
End: 2021-10-13

## 2021-10-13 VITALS
HEART RATE: 85 BPM | OXYGEN SATURATION: 97 % | TEMPERATURE: 97.6 F | WEIGHT: 162.8 LBS | RESPIRATION RATE: 18 BRPM | SYSTOLIC BLOOD PRESSURE: 130 MMHG | BODY MASS INDEX: 27.12 KG/M2 | DIASTOLIC BLOOD PRESSURE: 85 MMHG | HEIGHT: 65 IN

## 2021-10-13 DIAGNOSIS — E11.69 TYPE 2 DIABETES MELLITUS WITH OTHER SPECIFIED COMPLICATION, WITHOUT LONG-TERM CURRENT USE OF INSULIN (H): ICD-10-CM

## 2021-10-13 DIAGNOSIS — H00.015 HORDEOLUM EXTERNUM OF LEFT LOWER EYELID: Primary | ICD-10-CM

## 2021-10-13 LAB — HBA1C MFR BLD: 6.7 % (ref 4–6)

## 2021-10-13 PROCEDURE — 99213 OFFICE O/P EST LOW 20 MIN: CPT | Performed by: NURSE PRACTITIONER

## 2021-10-13 PROCEDURE — 36415 COLL VENOUS BLD VENIPUNCTURE: CPT | Performed by: NURSE PRACTITIONER

## 2021-10-13 PROCEDURE — 83036 HEMOGLOBIN GLYCOSYLATED A1C: CPT | Performed by: NURSE PRACTITIONER

## 2021-10-13 RX ORDER — POLYMYXIN B SULFATE AND TRIMETHOPRIM 1; 10000 MG/ML; [USP'U]/ML
1 SOLUTION OPHTHALMIC EVERY 4 HOURS
Qty: 3 ML | Refills: 0 | Status: SHIPPED | OUTPATIENT
Start: 2021-10-13 | End: 2021-10-20

## 2021-10-13 ASSESSMENT — MIFFLIN-ST. JEOR: SCORE: 1286.4

## 2021-10-13 NOTE — PROGRESS NOTES
"Problem(s) Oriented visit        SUBJECTIVE:                                                    Leticia Tompkins is a 61 year old female who presents to clinic today for the following health issues :    Noticed area of redness inside left lower eyelid about 2-3 weeks ago. Felt like she had eyelash in her eye which is why she looked. Thought area was a stye so has been using warm compresses but hasn't come to a head or gotten better. Not painful or itchy. No drainage or redness of her eye. Does not wear contacts but does wear glasses. No chemical or irritant exposure that she knows of. Has been using drops for dry eyes. Worried since her father had melanoma first diagnosed by spot in his eye.    Diabetes - well controlled with diet, exercise and Metformin 500 mg in AM and 1000 mg in pm.   Lab Results   Component Value Date    A1C 6.7 10/13/2021    A1C 6.8 07/13/2021    A1C 7.1 05/10/2021    A1C 6.5 08/10/2020    A1C 7.2 02/07/2020        Problem list, Medication list, Allergies, and Medical/Social/Surgical histories reviewed in [facility] and updated as appropriate.   Additional history: as documented    ROS:  5 point ROS completed and negative except noted above, including Gen, HEENT, CV, Resp    OBJECTIVE:                                                    /85   Pulse 85   Temp 97.6  F (36.4  C) (Temporal)   Resp 18   Ht 1.638 m (5' 4.5\")   Wt 73.8 kg (162 lb 12.8 oz)   SpO2 97%   BMI 27.51 kg/m    Body mass index is 27.51 kg/m .   GENERAL: healthy, alert and no distress  EYES: Inner left lower eyelid area of erythema & swelling. No other abnormalities visualized  HENT: normal cephalic/atraumatic and nose and mouth without ulcers or lesions  NECK: no adenopathy and no asymmetry, masses, or scars  PSYCH: affect normal/bright     ASSESSMENT/PLAN:                                                      Leticia was seen today for eye problem.    Diagnoses and all orders for this visit:    Hordeolum externum of left lower " eyelid  -     trimethoprim-polymyxin b (POLYTRIM) 21538-3.1 UNIT/ML-% ophthalmic solution; Place 1 drop Into the left eye every 4 hours for 7 days  Appearance most consistent with hordeolum, but odd that it is not tender. Will try antibiotic eye drops for 1 week, and if no improvement send to ophthalmology for further evaluation.    Type 2 diabetes mellitus with other specified complication, without long-term current use of insulin (H)  -     Hemoglobin A1C (RMG)  -     VENOUS COLLECTION  Well controlled. Due for diabetic eye exam - waiting to see if current eye exam gets better.      See Patient Instructions  Patient Instructions   Polytrim eye drop - 1 drop in left eye every 4 hours for 7 days (or until symptoms resolve)    Message or call in 1 week if no improvement, sooner if worsening      DONALD Hope CNP  [facility]  Family Practice  [facility]  [phone]    For any issues my office # is [phone]

## 2021-10-13 NOTE — PATIENT INSTRUCTIONS
Polytrim eye drop - 1 drop in left eye every 4 hours for 7 days (or until symptoms resolve)    Message or call in 1 week if no improvement, sooner if worsening

## 2021-10-21 ENCOUNTER — TELEPHONE (OUTPATIENT)
Dept: FAMILY MEDICINE | Facility: CLINIC | Age: 61
End: 2021-10-21

## 2021-10-21 DIAGNOSIS — H00.015 HORDEOLUM EXTERNUM OF LEFT LOWER EYELID: Primary | ICD-10-CM

## 2021-10-21 NOTE — TELEPHONE ENCOUNTER
Per Antonietta Cabezas CNP OK for referral to opthalmology. Leticia Jacome  Patient Referral Message Pool 9 hours ago (1:52 AM)     BP Leticia Tompkins would like to request a referral.  Reason: further investigate sore on my left lower eyelid  Requested provider: eye doctor  Comment:  I had an office visit with Antonietta URBINA on October 13 to check out a red spot inside my left lower eyelid. She prescribed an antibiotic eye drop. I used the drop every 4 hours for 7 days. The drops irritated my eye and did not change the red spot on my eyelid.

## 2021-10-26 NOTE — PROGRESS NOTES
10/21/21 faxed physician order and this office note to [facility] @ [phone]    Corbin Green,   [facility]  [phone]

## 2021-11-01 ENCOUNTER — HOSPITAL ENCOUNTER (OUTPATIENT)
Dept: MAMMOGRAPHY | Facility: CLINIC | Age: 61
Discharge: HOME | End: 2021-11-01
Attending: FAMILY MEDICINE | Admitting: FAMILY MEDICINE
Payer: COMMERCIAL

## 2021-11-01 DIAGNOSIS — Z12.31 VISIT FOR SCREENING MAMMOGRAM: ICD-10-CM

## 2021-11-01 PROCEDURE — 77063 BREAST TOMOSYNTHESIS BI: CPT

## 2021-11-04 DIAGNOSIS — F39 MOOD DISORDER (H): ICD-10-CM

## 2021-11-04 NOTE — TELEPHONE ENCOUNTER
Sertraline  LOV 10/13/21  PHQ 4/23/2020 5/18/2021 6/14/2021   PHQ-9 Total Score 3 13 11   Q9: Thoughts of better off dead/self-harm past 2 weeks Not at all Not at all Not at all

## 2021-12-02 DIAGNOSIS — F39 MOOD DISORDER (H): ICD-10-CM

## 2022-01-03 DIAGNOSIS — F39 MOOD DISORDER (H): ICD-10-CM

## 2022-01-03 NOTE — TELEPHONE ENCOUNTER
SERTRALINE     LOV: 10/13/21  LAST LABS: 10/13/21    PHQ-9 score:    PHQ 11/30/2021   PHQ-9 Total Score 5   Q9: Thoughts of better off dead/self-harm past 2 weeks Not at all

## 2022-02-05 ENCOUNTER — HEALTH MAINTENANCE LETTER (OUTPATIENT)
Age: 62
End: 2022-02-05

## 2022-03-12 NOTE — PATIENT INSTRUCTIONS
Preventive Health Recommendations  Female Ages 50 - 64    Yearly exam: See your health care provider every year in order to  o Review health changes.   o Discuss preventive care.    o Review your medicines if your doctor has prescribed any.      Get a Pap test every three years (unless you have an abnormal result and your provider advises testing more often).    If you get Pap tests with HPV test, you only need to test every 5 years, unless you have an abnormal result.     You do not need a Pap test if your uterus was removed (hysterectomy) and you have not had cancer.    You should be tested each year for STDs (sexually transmitted diseases) if you're at risk.     Have a mammogram every 1 to 2 years.    Have a colonoscopy at age 50, or have a yearly FIT test (stool test). These exams screen for colon cancer.      Have a cholesterol test every 5 years, or more often if advised.    Have a diabetes test (fasting glucose) every three years. If you are at risk for diabetes, you should have this test more often.     If you are at risk for osteoporosis (brittle bone disease), think about having a bone density scan (DEXA).    Shots: Get a flu shot each year. Get a tetanus shot every 10 years.    Nutrition:     Eat at least 5 servings of fruits and vegetables each day.    Eat whole-grain bread, whole-wheat pasta and brown rice instead of white grains and rice.    Get adequate Calcium and Vitamin D.     Lifestyle    Exercise at least 150 minutes a week (30 minutes a day, 5 days a week). This will help you control your weight and prevent disease.    Limit alcohol to one drink per day.    No smoking.     Wear sunscreen to prevent skin cancer.     See your dentist every six months for an exam and cleaning.    See your eye doctor every 1 to 2 years.    Health Maintenance   Topic Date Due     PHQ-2 Q1 YR  09/28/1970     HIV SCREEN (SYSTEM ASSIGNED)  09/28/1976     HEPATITIS C SCREENING  09/28/1976     ADVANCE DIRECTIVE PLANNING  Q5 YRS  09/28/2013     PAP SCREENING Q3 YR (SYSTEM ASSIGNED)  05/29/2018     LIPID SCREEN Q5 YR FEMALE (SYSTEM ASSIGNED)  05/29/2020     MAMMO SCREEN Q2 YR (SYSTEM ASSIGNED)  10/22/2020     COLON CANCER SCREEN (SYSTEM ASSIGNED)  03/26/2023     TETANUS IMMUNIZATION (SYSTEM ASSIGNED)  04/05/2026     INFLUENZA VACCINE  Completed       Please check with your insurance company to verify you have coverage benefits for the two stage shingles vaccination series (Shingrix). Shingles can be a very painful disease and the medications we have are generally not entirely effective at controlling shingles related pain. The vaccination is intended to prevent shingles and to help reduce the risk of having long-term pain if you do contract shingles.     Consider asking about a wait list at your pharmacy.    Negative

## 2022-04-28 DIAGNOSIS — E11.69 TYPE 2 DIABETES MELLITUS WITH OTHER SPECIFIED COMPLICATION, WITHOUT LONG-TERM CURRENT USE OF INSULIN (H): ICD-10-CM

## 2022-04-28 RX ORDER — ROSUVASTATIN CALCIUM 5 MG/1
TABLET, COATED ORAL
Qty: 90 TABLET | Refills: 0 | Status: SHIPPED | OUTPATIENT
Start: 2022-04-28 | End: 2022-06-21

## 2022-04-28 NOTE — TELEPHONE ENCOUNTER
rosuvastatin (CRESTOR) 5 MG    LOV 10/13/21- not related    Last lipid 5/10/21    Recent Labs   Lab Test 05/10/21  0930 08/10/20  1215   CHOL 236* 248*   HDL 84 76   * 159*   TRIG 58 67     \no appt - due    Left message to call back [facility]  April 28, 2022  Fadia Michel MA

## 2022-05-28 ENCOUNTER — HEALTH MAINTENANCE LETTER (OUTPATIENT)
Age: 62
End: 2022-05-28

## 2022-05-29 DIAGNOSIS — F39 MOOD DISORDER (H): ICD-10-CM

## 2022-06-14 DIAGNOSIS — E11.69 TYPE 2 DIABETES MELLITUS WITH OTHER SPECIFIED COMPLICATION, WITHOUT LONG-TERM CURRENT USE OF INSULIN (H): Primary | ICD-10-CM

## 2022-06-14 DIAGNOSIS — K57.30 DIVERTICULOSIS OF LARGE INTESTINE WITHOUT HEMORRHAGE: ICD-10-CM

## 2022-06-14 LAB
% GRANULOCYTES: 46.6 % (ref 42.2–75.2)
CHOL/HDL RATIO (RMG): 2.6 MG/DL (ref 0–4.5)
CHOLESTEROL: 195 MG/DL (ref 100–199)
HBA1C MFR BLD: 7.2 % (ref 4–6)
HCT VFR BLD AUTO: 37.6 % (ref 35–46)
HDL (RMG): 76 MG/DL (ref 40–?)
HEMOGLOBIN: 12.9 G/DL (ref 11.8–15.5)
LDL CALCULATED (RMG): 109 MG/DL (ref 0–130)
LYMPHOCYTES NFR BLD AUTO: 41.9 % (ref 20.5–51.1)
MCH RBC QN AUTO: 29.9 PG (ref 27–31)
MCHC RBC AUTO-ENTMCNC: 34.3 G/DL (ref 33–37)
MCV RBC AUTO: 87 FL (ref 80–100)
MONOCYTES NFR BLD AUTO: 11.5 % (ref 1.7–9.3)
PLATELET # BLD AUTO: 467 K/UL (ref 140–450)
RBC # BLD AUTO: 4.32 X10/CMM (ref 3.7–5.2)
TRIGLYCERIDES (RMG): 46 MG/DL (ref 0–149)
WBC # BLD AUTO: 5.4 X10/CMM (ref 3.8–11)

## 2022-06-14 PROCEDURE — 83036 HEMOGLOBIN GLYCOSYLATED A1C: CPT | Performed by: FAMILY MEDICINE

## 2022-06-14 PROCEDURE — 80061 LIPID PANEL: CPT | Mod: QW | Performed by: FAMILY MEDICINE

## 2022-06-14 PROCEDURE — 85025 COMPLETE CBC W/AUTO DIFF WBC: CPT | Performed by: FAMILY MEDICINE

## 2022-06-14 PROCEDURE — 82044 UR ALBUMIN SEMIQUANTITATIVE: CPT | Performed by: FAMILY MEDICINE

## 2022-06-14 PROCEDURE — 36415 COLL VENOUS BLD VENIPUNCTURE: CPT | Performed by: FAMILY MEDICINE

## 2022-06-15 LAB
A/C RATIO MG/G: <30 MG/G
ALBUMIN (URINE) MG/L: 10 MG/L
ALBUMIN SERPL-MCNC: 4.6 G/DL (ref 3.8–4.8)
ALBUMIN/GLOB SERPL: 1.6 {RATIO} (ref 1.2–2.2)
ALP SERPL-CCNC: 64 IU/L (ref 44–121)
ALT SERPL-CCNC: 16 IU/L (ref 0–32)
AST SERPL-CCNC: 21 IU/L (ref 0–40)
BILIRUB SERPL-MCNC: 0.4 MG/DL (ref 0–1.2)
BUN SERPL-MCNC: 12 MG/DL (ref 8–27)
BUN/CREATININE RATIO: 16 (ref 12–28)
CALCIUM SERPL-MCNC: 9.8 MG/DL (ref 8.7–10.3)
CHLORIDE SERPLBLD-SCNC: 102 MMOL/L (ref 96–106)
CREAT SERPL-MCNC: 0.75 MG/DL (ref 0.57–1)
EGFR: 89 ML/MIN/1.73
GLOBULIN, TOTAL: 2.9 G/DL (ref 1.5–4.5)
GLUCOSE SERPL-MCNC: 171 MG/DL (ref 65–99)
INTERPRETATION: NORMAL
POTASSIUM SERPL-SCNC: 4.7 MMOL/L (ref 3.5–5.2)
PROT SERPL-MCNC: 7.5 G/DL (ref 6–8.5)
SODIUM SERPL-SCNC: 140 MMOL/L (ref 134–144)
TOTAL CO2: 23 MMOL/L (ref 20–29)
URINE CREATININE MG/DL - QUEST: 200 MG/DL

## 2022-06-16 NOTE — RESULT ENCOUNTER NOTE
Dear Leticia,     I am writing to report that your included test results are as expected.  Most labs contain some results that are slightly outside of the normal range, I have reviewed each of these results and they require no changes at this time.    Lc Michael MD

## 2022-06-21 ENCOUNTER — OFFICE VISIT (OUTPATIENT)
Dept: FAMILY MEDICINE | Facility: CLINIC | Age: 62
End: 2022-06-21

## 2022-06-21 VITALS
BODY MASS INDEX: 27.55 KG/M2 | DIASTOLIC BLOOD PRESSURE: 79 MMHG | HEIGHT: 64 IN | WEIGHT: 161.4 LBS | SYSTOLIC BLOOD PRESSURE: 128 MMHG | OXYGEN SATURATION: 97 % | HEART RATE: 76 BPM

## 2022-06-21 DIAGNOSIS — I47.10 SUPRAVENTRICULAR TACHYCARDIA (H): ICD-10-CM

## 2022-06-21 DIAGNOSIS — N64.59 INVERTED NIPPLE: ICD-10-CM

## 2022-06-21 DIAGNOSIS — Z23 NEED FOR VACCINATION: ICD-10-CM

## 2022-06-21 DIAGNOSIS — Z78.0 MENOPAUSE: ICD-10-CM

## 2022-06-21 DIAGNOSIS — Z00.00 ROUTINE GENERAL MEDICAL EXAMINATION AT A HEALTH CARE FACILITY: Primary | ICD-10-CM

## 2022-06-21 DIAGNOSIS — E11.69 TYPE 2 DIABETES MELLITUS WITH OTHER SPECIFIED COMPLICATION, WITHOUT LONG-TERM CURRENT USE OF INSULIN (H): ICD-10-CM

## 2022-06-21 DIAGNOSIS — F39 MOOD DISORDER (H): ICD-10-CM

## 2022-06-21 PROCEDURE — 99396 PREV VISIT EST AGE 40-64: CPT | Mod: 25 | Performed by: FAMILY MEDICINE

## 2022-06-21 PROCEDURE — 90677 PCV20 VACCINE IM: CPT | Performed by: FAMILY MEDICINE

## 2022-06-21 PROCEDURE — 90471 IMMUNIZATION ADMIN: CPT | Mod: 59 | Performed by: FAMILY MEDICINE

## 2022-06-21 PROCEDURE — 99213 OFFICE O/P EST LOW 20 MIN: CPT | Mod: 25 | Performed by: FAMILY MEDICINE

## 2022-06-21 RX ORDER — ROSUVASTATIN CALCIUM 5 MG/1
TABLET, COATED ORAL
Qty: 180 TABLET | Refills: 3 | Status: SHIPPED | OUTPATIENT
Start: 2022-06-21 | End: 2022-08-23 | Stop reason: DRUGHIGH

## 2022-06-21 RX ORDER — SERTRALINE HYDROCHLORIDE 100 MG/1
50 TABLET, FILM COATED ORAL DAILY
Qty: 90 TABLET | Refills: 0 | Status: SHIPPED | OUTPATIENT
Start: 2022-06-21 | End: 2022-06-21

## 2022-06-21 RX ORDER — ROSUVASTATIN CALCIUM 5 MG/1
10 TABLET, COATED ORAL DAILY
Qty: 90 TABLET | Refills: 3 | Status: SHIPPED | OUTPATIENT
Start: 2022-06-21 | End: 2022-06-21

## 2022-06-21 RX ORDER — SERTRALINE HYDROCHLORIDE 100 MG/1
100 TABLET, FILM COATED ORAL DAILY
Qty: 90 TABLET | Refills: 0 | Status: SHIPPED | OUTPATIENT
Start: 2022-06-21 | End: 2022-09-15

## 2022-06-21 ASSESSMENT — ENCOUNTER SYMPTOMS
ABDOMINAL PAIN: 0
HEADACHES: 0
PARESTHESIAS: 0
EYE PAIN: 0
NAUSEA: 0
COUGH: 0
SHORTNESS OF BREATH: 0
HEARTBURN: 0
DIZZINESS: 0
PALPITATIONS: 0
NERVOUS/ANXIOUS: 0
FREQUENCY: 0
DIARRHEA: 0
ARTHRALGIAS: 1
HEMATOCHEZIA: 0
DYSURIA: 0
MYALGIAS: 0
FEVER: 0
JOINT SWELLING: 0
CHILLS: 0
WEAKNESS: 0
HEMATURIA: 0
SORE THROAT: 0
CONSTIPATION: 0

## 2022-06-21 ASSESSMENT — PATIENT HEALTH QUESTIONNAIRE - PHQ9
5. POOR APPETITE OR OVEREATING: NOT AT ALL
SUM OF ALL RESPONSES TO PHQ QUESTIONS 1-9: 10

## 2022-06-21 ASSESSMENT — ANXIETY QUESTIONNAIRES
5. BEING SO RESTLESS THAT IT IS HARD TO SIT STILL: NOT AT ALL
GAD7 TOTAL SCORE: 0
2. NOT BEING ABLE TO STOP OR CONTROL WORRYING: NOT AT ALL
7. FEELING AFRAID AS IF SOMETHING AWFUL MIGHT HAPPEN: NOT AT ALL
GAD7 TOTAL SCORE: 0
1. FEELING NERVOUS, ANXIOUS, OR ON EDGE: NOT AT ALL
3. WORRYING TOO MUCH ABOUT DIFFERENT THINGS: NOT AT ALL
6. BECOMING EASILY ANNOYED OR IRRITABLE: NOT AT ALL

## 2022-06-21 NOTE — COMMUNITY RESOURCES LIST (ENGLISH)
06/21/2022   [facility] - Outpatient Clinics  Lc Michael  For questions about this resource list or additional care needs, please contact your primary care clinic or care manager.  Phone: [phone]   Email: N/A   Address: [address] [place] [address]   Hours: N/A        Hotlines and Helplines       Hotline - Crisis help  1  [facility] Distance: 1.34 miles      COVID-19 Status: Phone/Virtual   [address]  [place] [address]  Language: English  Hours: Mon - Sun Open 24 Hours   Phone: (818) 244-6838 Website: http://www.crisisnursery.org     2  [employer] ([employer]) - Hotline - Crisis Help (967) 657-6646 Distance: 2.43 miles      COVID-19 Status: Phone/Virtual   [address] [place] [address]  Language: English  Hours: Mon - Sun Open 24 Hours   Phone: (581) 944-3043 Email: [email or web address] Website: http://www.apb.org/          Mental Health       Individual counseling  3  [facility] Distance: 0.23 miles      COVID-19 Status: Regular Operations, COVID-19 Status: Phone/Virtual   [address] [place] [address]  Language: English  Hours: Mon - Fri 8:30 AM - 5:30 PM  Fees: Insurance, Self Pay   Phone: (214) 562-8143 Email: contactus@[employer] Website: [email or web address]/[facility]     4  [facility] Distance: 1.25 miles      COVID-19 Status: Regular Operations, COVID-19 Status: Phone/Virtual   [address] [place] [address]  Language: English, Niuean  Hours: Mon - Thu 8:00 AM - 8:00 PM , Fri 8:00 AM - 5:00 PM  Fees: Insurance, Self Pay, Sliding Fee   Phone: (740) 535-1179 Email: intake@[employer] Website: https://www.PageB[employer].org     Mental health crisis care  5  [facility] Acute Psychiatry Services Distance: 4.84 miles      COVID-19 Status: Regular  Operations   [address] [place] [address]  Language: English  Hours: Mon - Sun Open 24 Hours  Fees: Insurance, Self Pay, Sliding Fee   Phone: (933) 659-4864 Website: https://www.[facility].org/specialty/psychiatry/acute-psychiatry-services/     6  [facility] (COPE) Distance: 5.02 miles      COVID-19 Status: Regular Operations, COVID-19 Status: Phone/Virtual   [address] [place] [address]  Language: English, Egyptian, Yakut  Hours: Mon - Sun Open 24 Hours  Fees: Free   Phone: (143) 767-9029 Email: [email or web address].team@[email or web address]. Website: http://www.[employer]denBugsnag/residents/emergencies/mental-health-emergencies     Mental health support group  7  [facility] Distance: 3.19 miles      COVID-19 Status: Regular Operations, COVID-19 Status: Phone/Virtual   [address] [facility] [address] [place] [address]  Language: English  Hours: Mon - Fri 8:30 AM - 5:00 PM  Fees: Insurance, Self Pay   Phone: (714) 952-7404 Email: info@[employer] Website: http://www.[employer]/     8  [facility] Distance: 3.32 miles      COVID-19 Status: Phone/Virtual   [address] [place] [address]  Language: English  Hours: Mon - Thu 9:30 AM - 3:00 PM  Fees: Free   Phone: (703) 800-8076 Email: info@[employer] Website: http://www.[employer]fer[employer]i.org/          Important Numbers & Websites       Emergency Services   911  City Services   311  Poison Control   (888) 560-9558  Suicide Prevention Lifeline   (831) 487-7678 (TALK)  Child Abuse Hotline   (645) 409-8136 (4-A-Child)  Sexual Assault Hotline   (940) 347-5378 (HOPE)  National Runaway Safeline   (441) 208-8781 (RUNAWAY)  All-Options Talkline   (403) 932-1460  Substance Abuse Referral   (454) 399-7475 (HELP)

## 2022-06-21 NOTE — PROGRESS NOTES
SUBJECTIVE:   CC: Leticia Tompkins is an 61 year old woman who presents for preventive health visit.     Patient has been advised of split billing requirements and indicates understanding: Yes     Healthy Habits:  Answers for HPI/ROS submitted by the patient on 6/21/2022  Frequency of exercise:: 6-7 days/week  Getting at least 3 servings of Calcium per day:: NO  Diet:: Regular (no restrictions)  Taking medications regularly:: Yes  Medication side effects:: None  Bi-annual eye exam:: Yes  Dental care twice a year:: Yes  Sleep apnea or symptoms of sleep apnea:: None  abdominal pain: No  Blood in stool: No  Blood in urine: No  chest pain: No  chills: No  congestion: No  constipation: No  cough: No  diarrhea: No  dizziness: No  ear pain: No  eye pain: No  nervous/anxious: No  fever: No  frequency: No  genital sores: No  headaches: No  hearing loss: No  heartburn: No  arthralgias: Yes  joint swelling: No  peripheral edema: No  mood changes: No  myalgias: No  nausea: No  dysuria: No  palpitations: No  Skin sensation changes: No  sore throat: No  urgency: No  rash: No  shortness of breath: No  visual disturbance: No  weakness: No  pelvic pain: No  vaginal bleeding: No  vaginal discharge: No  breast discharge: No  Additional concerns today:: Yes  Duration of exercise:: 45-60 minutes    Hearing Screening:  Right Ear  4000Hz: FAIL  2000Hz: FAIL  1000Hz: pass  500Hz: pass    Left Ear  4000Hz: FAIL  2000Hz: FAIL  1000Hz: pass  500Hz: pass    PROBLEMS TO ADD ON...    Today's PHQ-2 Score:   PHQ-2 ( 1999 Pfizer) 6/21/2022 5/18/2021   Q1: Little interest or pleasure in doing things 1 1   Q2: Feeling down, depressed or hopeless 1 1   PHQ-2 Score 2 2   PHQ-2 Total Score (12-17 Years)- Positive if 3 or more points; Administer PHQ-A if positive - 2   Q1: Little interest or pleasure in doing things Several days -   Q2: Feeling down, depressed or hopeless Several days -   PHQ-2 Score 2 -     Abuse: Current or Past(Physical, Sexual or  Emotional)- NO  Do you feel safe in your environment? YES    Social History     Tobacco Use     Smoking status: Former Smoker     Types: Cigarettes     Quit date: 3/12/1983     Years since quittin.3     Smokeless tobacco: Never Used   Substance Use Topics     Alcohol use: Yes     Alcohol/week: 0.0 - 2.5 standard drinks     Comment: socially     If you drink alcohol do you typically have >3 drinks per day or >7 drinks per week? No                     Reviewed orders with patient.  Reviewed health maintenance and updated orders accordingly - Yes  Lab work is in process    FHS-7:   Breast CA Risk Assessment (FHS-7) 2021   Did any of your first-degree relatives have breast or ovarian cancer? No No   Did any of your relatives have bilateral breast cancer? Unknown Yes   Did any man in your family have breast cancer? No No   Did any woman in your family have breast and ovarian cancer? No Unknown   Did any woman in your family have breast cancer before age 50 y? Yes Yes   Do you have 2 or more relatives with breast and/or ovarian cancer? Yes Yes   Do you have 2 or more relatives with breast and/or bowel cancer? Yes No       Mammogram Screening - Offered annual screening and updated Health Maintenance for mutual plan based on risk factor consideration   and Mammogram Screening: Recommended mammography every 1-2 years with patient discussion and risk factor consideration  Pertinent mammograms are reviewed under the imaging tab.    Pertinent mammograms are reviewed under the imaging tab.  History of abnormal Pap smear: NO - age 30- 65 PAP every 3 years recommended     Reviewed and updated as needed this visit by clinical staff   Tobacco  Allergies  Meds                Reviewed and updated as needed this visit by Provider                   Past Medical History:   Diagnosis Date     Depressive disorder, not elsewhere classified     treated with Zoloft for 8 months     Lumbago     L5-S1 radiculopathy  "    Spider veins      Tachycardia, unspecified 2004    Single episode treated with Adenosine      Past Surgical History:   Procedure Laterality Date     BLADDER SURGERY       CYSTOCELE REPAIR N/A 0/2013     HYSTERECTOMY N/A 05/23/20014    Supracervical. See Path via Care Everywhere at [facility].      ORTHOPEDIC SURGERY Left 4-19-16    left foot bunionectomy     ORTHOPEDIC SURGERY      Foot surgery      RECTOCELE REPAIR N/A 05/2013     [facility] NONSPECIFIC PROCEDURE  1991    Tubal ligation     [facility] NONSPECIFIC PROCEDURE      Lasik eye surgery     [facility] NONSPECIFIC PROCEDURE  3/07     L5-S1 discectomy       ROS:  CONSTITUTIONAL: NEGATIVE for fever, chills, change in weight  INTEGUMENTARY/SKIN: NEGATIVE for worrisome rashes, moles or lesions  EYES: NEGATIVE for vision changes or irritation  ENT: NEGATIVE for ear, mouth and throat problems  RESP: NEGATIVE for significant cough or SOB  BREAST: NEGATIVE for masses, tenderness or discharge  CV: NEGATIVE for chest pain, palpitations or peripheral edema  GI: NEGATIVE for nausea, abdominal pain, heartburn, or change in bowel habits  : NEGATIVE for unusual urinary or vaginal symptoms. No vaginal bleeding.  MUSCULOSKELETAL: NEGATIVE for significant arthralgias or myalgia  NEURO: NEGATIVE for weakness, dizziness or paresthesias  PSYCHIATRIC: NEGATIVE for changes in mood or affect     OBJECTIVE:   /79   Pulse 76   Ht 1.626 m (5' 4\")   Wt 73.2 kg (161 lb 6.4 oz)   SpO2 97%   BMI 27.70 kg/m    EXAM:  GENERAL APPEARANCE: healthy, alert and no distress  EYES: Eyes grossly normal to inspection, PERRL and conjunctivae and sclerae normal  HENT: ear canals and TM's normal, nose and mouth without ulcers or lesions, oropharynx clear and oral mucous membranes moist  NECK: no adenopathy, no asymmetry, masses, or scars and thyroid normal to palpation  RESP: lungs clear to auscultation - no rales, rhonchi or wheezes  BREAST: normal without masses, tenderness or nipple discharge and no palpable " axillary masses or adenopathy  CV: regular rate and rhythm, normal S1 S2, no S3 or S4, no murmur, click or rub, no peripheral edema and peripheral pulses strong  ABDOMEN: soft, nontender, no hepatosplenomegaly, no masses and bowel sounds normal  MS: no musculoskeletal defects are noted and gait is age appropriate without ataxia  SKIN: no suspicious lesions or rashes  NEURO: Normal strength and tone, sensory exam grossly normal, mentation intact and speech normal  PSYCH: mentation appears normal and affect normal/bright    Diagnostic Test Results:  Labs reviewed in Epic    ASSESSMENT/PLAN:   Leticia was seen today for physical, derm problem, recheck medication, breast problem, mental health problem, fatigue and hearing screening.    Diagnoses and all orders for this visit:    Routine general medical examination at a health care facility    Mood disorder (H)  Spoke at length about mood disorders including suspected pathophysiology, role of neurotransmitters, and treatment options including medication options ( especially SSRIs that treat cause of sx) and counselling.  Tolerating current meds. We discussed ongoing management of her  medications and how we will refill the medications now and in the future.    -     sertraline (ZOLOFT) 100 MG tablet; Take 0.5 tablets (50 mg) by mouth daily    Type 2 diabetes mellitus with other specified complication, without long-term current use of insulin (H)  As a direct cause of their history of diabetes they have the following Diabetic complications: svt may have been related but nothing for 11 months.  Most  recent A1C:     The last available A1C values from [facility] reference lab, [facility]:  No components found for: [id number]     Lab Results   Component Value Date    A1C 7.2 06/14/2022    A1C 6.7 10/13/2021    A1C 6.8 07/13/2021    History   Smoking Status     Former Smoker     Types: Cigarettes     Quit date: 3/12/1983   Smokeless Tobacco     Never Used    @     Kidney  "studies:  Creatinine   Date Value Ref Range Status   06/14/2022 0.75 0.57 - 1.00 mg/dL Final   05/10/2021 0.78 0.57 - 1.00 mg/dL Final   01/10/2020 0.75 0.57 - 1.00 mg/dL Final   ]    GFR Estimate   Date Value Ref Range Status   03/08/2018 >90 >60 mL/min/1.7m2 Final     Comment:     Non  GFR Calc                No components found for: MICORALBUMINLC      GFR Estimate If Black   Date Value Ref Range Status   03/08/2018 >90 >60 mL/min/1.7m2 Final     Comment:      GFR Calc                Discussed importance in compliance in all areas of diabetic control including diet, routine BS checks, absolute medication compliance, laboratory monitoring, and attending regular follow up appointments as ordered.  Reviewed that failure to comply with instructions regarding diabetes will lead to a greater chance of poor diabetic control and therefore a greater chance of diabetes related complications such as CAD, CVA, PVD, and retinopathy/neuropathy/nephropathy.  We today managed prescriptions with refills ensured to ensure appropriate availability of current medications.  Next follow up will be in  months.    -     rosuvastatin (CRESTOR) 5 MG tablet; Take 2 tablets (10 mg) by mouth daily    Supraventricular tachycardia (H)  Passed out a number of years ago and has not had any symptoms since last summer.    Need for vaccination  -     PNEUMOCOCCAL 20 VALENT CONJUGATE (PREVNAR 20)    Menopause  -     DEXA - Hip/Pelvis/Spine (FUTURE/[facility]); Future    Inverted nipple  -     Oncology/Hematology Adult Referral; Future        Patient has been advised of split billing requirements and indicates understanding: Yes  COUNSELING:   Reviewed preventive health counseling, as reflected in patient instructions       Regular exercise       Healthy diet/nutrition    Estimated body mass index is 27.7 kg/m  as calculated from the following:    Height as of this encounter: 1.626 m (5' 4\").    Weight as of this " encounter: 73.2 kg (161 lb 6.4 oz).        She reports that she quit smoking about 39 years ago. Her smoking use included cigarettes. She has never used smokeless tobacco.      Counseling Resources:  ATP IV Guidelines  Pooled Cohorts Equation Calculator  Breast Cancer Risk Calculator  BRCA-Related Cancer Risk Assessment: FHS-7 Tool  FRAX Risk Assessment  ICSI Preventive Guidelines  Dietary Guidelines for Americans, 2010  USDA's MyPlate  ASA Prophylaxis  Lung CA Screening    Lc Michael MD  [facility]

## 2022-06-21 NOTE — PATIENT INSTRUCTIONS
100 squats a day.      Preventive Health Recommendations  Female Ages 50 - 64    Yearly exam: See your health care provider every year in order to  Review health changes.   Discuss preventive care.    Review your medicines if your doctor has prescribed any.    Get a Pap test every three years (unless you have an abnormal result and your provider advises testing more often).  If you get Pap tests with HPV test, you only need to test every 5 years, unless you have an abnormal result.   You do not need a Pap test if your uterus was removed (hysterectomy) and you have not had cancer.  You should be tested each year for STDs (sexually transmitted diseases) if you're at risk.   Have a mammogram every 1 to 2 years.  Have a colonoscopy at age 50, or have a yearly FIT test (stool test). These exams screen for colon cancer.    Have a cholesterol test every 5 years, or more often if advised.  Have a diabetes test (fasting glucose) every three years. If you are at risk for diabetes, you should have this test more often.   If you are at risk for osteoporosis (brittle bone disease), think about having a bone density scan (DEXA).    Shots: Get a flu shot each year. Get a tetanus shot every 10 years.    Nutrition:   Eat at least 5 servings of fruits and vegetables each day.  Eat whole-grain bread, whole-wheat pasta and brown rice instead of white grains and rice.  Get adequate Calcium and Vitamin D.     Lifestyle  Exercise at least 150 minutes a week (30 minutes a day, 5 days a week). This will help you control your weight and prevent disease.  Limit alcohol to one drink per day.  No smoking.   Wear sunscreen to prevent skin cancer.   See your dentist every six months for an exam and cleaning.  See your eye doctor every 1 to 2 years.

## 2022-07-08 DIAGNOSIS — E11.69 TYPE 2 DIABETES MELLITUS WITH OTHER SPECIFIED COMPLICATION, WITHOUT LONG-TERM CURRENT USE OF INSULIN (H): ICD-10-CM

## 2022-07-08 RX ORDER — METFORMIN HCL 500 MG
TABLET, EXTENDED RELEASE 24 HR ORAL
Qty: 270 TABLET | Refills: 3 | Status: SHIPPED | OUTPATIENT
Start: 2022-07-08 | End: 2023-07-01

## 2022-08-23 ENCOUNTER — VIRTUAL VISIT (OUTPATIENT)
Dept: PHARMACY | Facility: PHYSICIAN GROUP | Age: 62
End: 2022-08-23
Payer: COMMERCIAL

## 2022-08-23 DIAGNOSIS — E78.5 HYPERLIPIDEMIA LDL GOAL <100: ICD-10-CM

## 2022-08-23 DIAGNOSIS — Z78.9 TAKES DIETARY SUPPLEMENTS: ICD-10-CM

## 2022-08-23 DIAGNOSIS — F39 MOOD DISORDER (H): ICD-10-CM

## 2022-08-23 DIAGNOSIS — E11.69 TYPE 2 DIABETES MELLITUS WITH OTHER SPECIFIED COMPLICATION, WITHOUT LONG-TERM CURRENT USE OF INSULIN (H): Primary | ICD-10-CM

## 2022-08-23 PROCEDURE — 99607 MTMS BY PHARM ADDL 15 MIN: CPT | Performed by: PHARMACIST

## 2022-08-23 PROCEDURE — 99605 MTMS BY PHARM NP 15 MIN: CPT | Performed by: PHARMACIST

## 2022-08-23 RX ORDER — ROSUVASTATIN CALCIUM 10 MG/1
10 TABLET, COATED ORAL DAILY
Qty: 90 TABLET | Refills: 1 | Status: SHIPPED | OUTPATIENT
Start: 2022-08-23 | End: 2023-01-25

## 2022-08-23 RX ORDER — TIRZEPATIDE 2.5 MG/.5ML
2.5 INJECTION, SOLUTION SUBCUTANEOUS WEEKLY
Qty: 3 ML | Refills: 0 | Status: SHIPPED | OUTPATIENT
Start: 2022-08-23 | End: 2022-08-25

## 2022-08-23 NOTE — PROGRESS NOTES
Medication Therapy Management (MTM) Encounter    ASSESSMENT:                            Medication Adherence/Access: No issues identified    Type 2 Diabetes: Patient is not meeting A1c goal of < 7%. Patient would benefit from starting Mounjaro 2.5mg weekly x 4 weeks to help with blood sugar and weight loss. Discussed MOA, administration, side effects, monitoring, costs and titration. Would continue metformin as is.     Hyperlipidemia: switch to 10mg tab to decrease pill burden and possible costs.     Depression: Continue to monitor, discussed impact of sugars on fatigue and overall energy.    Supplements: Stable.    PLAN:                            1. Mounjaro 2.5mg weekly x 4 weeks    2. Savings card sent through [employer]      Follow-up: Return in about 4 weeks (around 9/20/2022) for Phone call with MTM.    SUBJECTIVE/OBJECTIVE:                          Leticia Tompkins is a 61 year old female called for an initial visit. She was referred to me from Dr. Michael.      Reason for visit: Discuss Mounjaro.    Allergies/ADRs: Reviewed in chart  Past Medical History: Reviewed in chart  Tobacco: She reports that she quit smoking about 39 years ago. Her smoking use included cigarettes. She has never used smokeless tobacco.     Medication Adherence/Access: no issues reported    Type 2 Diabetes: Currently taking metformin ER 1500mg daily. Patient is not experiencing side effects.   Blood sugar monitoring: never. Ranges (patient reported): none  Symptoms of low blood sugar? none  Symptoms of high blood sugar? none  Diet/Exercise: looking to decrease weight and improve energy, feeling frustrated with current state  Aspirin: Not taking  Statin: Yes: rosuvastatin   ACEi/ARB: No.    Lab Results   Component Value Date    A1C 7.2 06/14/2022    A1C 6.7 10/13/2021    A1C 6.8 07/13/2021    A1C 7.1 05/10/2021    A1C 6.5 08/10/2020       Hyperlipidemia: Current therapy includes rosuvastatin 5mg daily.  Patient reports no significant myalgias  or other side effects.    Recent Labs   Lab Test 05/10/21  0930 08/10/20  1215   CHOL 236* 248*   HDL 84 76   * 159*   TRIG 58 67       Depression:  Current medications include: Sertraline 100mg once daily (increased from 50mg). Patient reports that depression symptoms are unchanged.  PHQ-9 SCORE 6/14/2021 11/30/2021 6/21/2022   PHQ-9 Total Score MyChart - 5 (Mild depression) -   PHQ-9 Total Score 11 5 10       Supplements: Currently taking MV daily, Fish oil, Mag, Vitamin D. No reported issues at this time.     Hyperlipidemia: Current therapy includes rosuvastatin 10mg daily (taking two 5mg tabs).  Patient reports no significant myalgias or other side effects.    Recent Labs   Lab Test 05/10/21  0930 08/10/20  1215   CHOL 236* 248*   HDL 84 76   * 159*   TRIG 58 67       ----------------    I spent 28 minutes with this patient today. All changes were made via verbal authorization with Lc Michael MD. A copy of the visit note was provided to the patient's provider(s).    The patient was sent via VideoBurst a summary of these recommendations.     Ema Mcleod, Pharm.D, [facility]  Medication Therapy Management Pharmacist  [phone]      Telemedicine Visit Details  Type of service:  Telephone visit  Start Time: 12:31 PM  End Time: 12:59 PM  Originating Location (patient location): Home  Distant Location (provider location):  [facility]     Medication Therapy Recommendations  Type 2 diabetes mellitus with other specified complication, without long-term current use of insulin (H)    Current Medication: Tirzepatide (MOUNJARO) 2.5 MG/0.5ML SOPN   Rationale: Synergistic therapy - Needs additional medication therapy - Indication   Recommendation: Start Medication - Start 2.5mg weekly   Status: Accepted per Provider

## 2022-08-23 NOTE — PATIENT INSTRUCTIONS
"Recommendations from today's MTM visit:                                                    MTM (medication therapy management) is a service provided by a clinical pharmacist designed to help you get the most of out of your medicines.   Today we reviewed what your medicines are for, how to know if they are working, that your medicines are safe and how to make your medicine regimen as easy as possible.        1. Start Mounjaro 2.5mg weekly x 4 weeks.        Follow-up: Return in about 4 weeks (around 9/20/2022) for Phone call with MTM.    It was great speaking with you today.  I value your experience and would be very thankful for your time in providing feedback in our clinic survey. In the next few days, you may receive an email or text message from "[employer] with a link to a survey related to your  clinical pharmacist.\"     My Clinical Pharmacist's contact information:                                                      Please feel free to contact me with any questions or concerns you have.      Ema Mcleod, Pharm.D, [facility]  Medication Therapy Management Pharmacist  [phone]     "

## 2022-08-25 ENCOUNTER — TELEPHONE (OUTPATIENT)
Dept: FAMILY MEDICINE | Facility: CLINIC | Age: 62
End: 2022-08-25

## 2022-08-25 DIAGNOSIS — E11.69 TYPE 2 DIABETES MELLITUS WITH OTHER SPECIFIED COMPLICATION, WITHOUT LONG-TERM CURRENT USE OF INSULIN (H): Primary | ICD-10-CM

## 2022-08-25 RX ORDER — DULAGLUTIDE 0.75 MG/.5ML
0.75 INJECTION, SOLUTION SUBCUTANEOUS
Qty: 2 ML | Refills: 2 | Status: SHIPPED | OUTPATIENT
Start: 2022-08-25 | End: 2022-09-15 | Stop reason: DRUGHIGH

## 2022-08-25 NOTE — TELEPHONE ENCOUNTER
Per Ema Mcleod-YASH prescription sent to pharmacy for Trulicity 0.75mg weekly. Message left for patient with this information and requested return call to confirm that she received message. Evette Zambrano

## 2022-08-25 NOTE — TELEPHONE ENCOUNTER
----- Message from Ema Mcleod RPH sent at 8/24/2022  3:02 PM CDT -----  Regarding: RE: chris Hall, we did talk about this. I would send for Trulicity 0.75mg weekly- can you please let her know the situation and that I think this is still a great option for her goals.    Ema Mcleod, Pharm.D, [facility]  Medication Therapy Management Pharmacist  [phone]    ----- Message -----  From: Evette Cano LPN  Sent: 8/24/2022   2:15 PM CDT  To: Ema Mcleod RPH  Subject: mounjaro                                         Mounjaro is not covered by insurance. For the coupon to apply insurance must cover the medication. Formulary alternatives are Ozempiz(this is having availability issues right now!), Victoza or Trulicity. What do you think??

## 2022-09-15 ENCOUNTER — MYC MEDICAL ADVICE (OUTPATIENT)
Dept: PHARMACY | Facility: PHYSICIAN GROUP | Age: 62
End: 2022-09-15

## 2022-09-15 DIAGNOSIS — E11.69 TYPE 2 DIABETES MELLITUS WITH OTHER SPECIFIED COMPLICATION, WITHOUT LONG-TERM CURRENT USE OF INSULIN (H): ICD-10-CM

## 2022-09-15 DIAGNOSIS — F39 MOOD DISORDER (H): ICD-10-CM

## 2022-09-15 RX ORDER — DULAGLUTIDE 1.5 MG/.5ML
1.5 INJECTION, SOLUTION SUBCUTANEOUS
Qty: 2 ML | Refills: 0 | Status: SHIPPED | OUTPATIENT
Start: 2022-09-15 | End: 2022-10-12

## 2022-09-15 RX ORDER — SERTRALINE HYDROCHLORIDE 100 MG/1
TABLET, FILM COATED ORAL
Qty: 90 TABLET | Refills: 1 | Status: SHIPPED | OUTPATIENT
Start: 2022-09-15 | End: 2023-03-17

## 2022-09-15 NOTE — TELEPHONE ENCOUNTER
Sertraline. LOV 6/21/22.    Mood disorder (H)  Spoke at length about mood disorders including suspected pathophysiology, role of neurotransmitters, and treatment options including medication options ( especially SSRIs that treat cause of sx) and counselling.  Tolerating current meds. We discussed ongoing management of her  medications and how we will refill the medications now and in the future.     -     sertraline (ZOLOFT) 100 MG tablet; Take 0.5 tablets (50 mg) by mouth daily

## 2022-09-15 NOTE — TELEPHONE ENCOUNTER
Tolerating Trulicity 0.75mg weekly has 1 week remaining, plan to titrated to 1.5mg weekly per CPA with Dr. Michael.    Ema Mcleod, Pharm.D, [facility]  Medication Therapy Management Pharmacist  [phone]

## 2022-10-01 ENCOUNTER — HEALTH MAINTENANCE LETTER (OUTPATIENT)
Age: 62
End: 2022-10-01

## 2022-10-06 DIAGNOSIS — E11.69 TYPE 2 DIABETES MELLITUS WITH OTHER SPECIFIED COMPLICATION, WITHOUT LONG-TERM CURRENT USE OF INSULIN (H): ICD-10-CM

## 2022-10-06 DIAGNOSIS — Z23 FLU VACCINE NEED: Primary | ICD-10-CM

## 2022-10-06 LAB — HBA1C MFR BLD: 6.2 % (ref 4–6)

## 2022-10-06 PROCEDURE — 36415 COLL VENOUS BLD VENIPUNCTURE: CPT | Performed by: FAMILY MEDICINE

## 2022-10-06 PROCEDURE — 83036 HEMOGLOBIN GLYCOSYLATED A1C: CPT | Performed by: FAMILY MEDICINE

## 2022-10-06 PROCEDURE — 90674 CCIIV4 VAC NO PRSV 0.5 ML IM: CPT | Performed by: FAMILY MEDICINE

## 2022-10-06 PROCEDURE — 90471 IMMUNIZATION ADMIN: CPT | Performed by: FAMILY MEDICINE

## 2022-10-06 NOTE — PROGRESS NOTES
Leticia Tompkins      1.  Has the patient received the information for the influenza vaccine? YES    2.  Does the patient have any of the following contraindications?     Allergy to eggs? No     Allergic reaction to previous influenza vaccines? No     Any other problems to previous influenza vaccines? No     Paralyzed by Guillain-Humboldt syndrome? No     Currently pregnant? NO     Current moderate or severe illness? No     Allergy to contact lens solution? No    3.  The vaccine has been administered in the usual fashion and the patient was instructed to wait 20 minutes before leaving the building in the event of an allergic reaction: no    Vaccination given by Fadia Michel MA October 6, 2022 4:02 PM  .  Recorded by Fadia Michel MA

## 2022-10-06 NOTE — RESULT ENCOUNTER NOTE
Dear Leticia,     I am writing to report that your included test results are as expected.    Many labs contain some results that are slightly outside of the normal range, I have reviewed any of these results and they require no changes at this time.    Lc Michael MD   Detail Level: Detailed Biopsy Type: H and E Bill As?: Biopsy by Shave Method Size Of Lesion In Cm (Optional): 1.5 Size Of Lesion After Curettage: 2 Anesthesia Type: 1% lidocaine with epinephrine Anesthesia Volume In Cc: 0.5 Hemostasis: Electrocautery Destruction Type: curettage Number Of Curettages: 3 Wound Care: Vaseline Lab: [id number] Lab Facility:  [facility] Notes In Note?: No Consent: Written consent was obtained and risks were reviewed including but not limited to scarring, infection, bleeding, scabbing, incomplete removal, nerve damage and allergy to anesthesia. Post-Care Instructions: I reviewed with the patient in detail post-care instructions. Patient is to keep the biopsy site dry overnight, and then apply bacitracin twice daily until healed. Patient may apply hydrogen peroxide soaks to remove any crusting. Notification Instructions: Patient will be notified of biopsy results. However, patient instructed to call the office if not contacted within 2 weeks. Billing Type: Third-Party Bill

## 2022-10-12 DIAGNOSIS — E11.69 TYPE 2 DIABETES MELLITUS WITH OTHER SPECIFIED COMPLICATION, WITHOUT LONG-TERM CURRENT USE OF INSULIN (H): ICD-10-CM

## 2022-10-12 RX ORDER — DULAGLUTIDE 1.5 MG/.5ML
INJECTION, SOLUTION SUBCUTANEOUS
Qty: 2 ML | Refills: 0 | Status: SHIPPED | OUTPATIENT
Start: 2022-10-12 | End: 2022-11-07

## 2022-11-04 ENCOUNTER — HOSPITAL ENCOUNTER (OUTPATIENT)
Dept: MAMMOGRAPHY | Facility: CLINIC | Age: 62
Discharge: HOME | End: 2022-11-04
Attending: FAMILY MEDICINE | Admitting: FAMILY MEDICINE
Payer: COMMERCIAL

## 2022-11-04 DIAGNOSIS — Z12.31 VISIT FOR SCREENING MAMMOGRAM: ICD-10-CM

## 2022-11-04 PROCEDURE — 77067 SCR MAMMO BI INCL CAD: CPT

## 2022-11-07 ENCOUNTER — VIRTUAL VISIT (OUTPATIENT)
Dept: PHARMACY | Facility: PHYSICIAN GROUP | Age: 62
End: 2022-11-07
Payer: COMMERCIAL

## 2022-11-07 DIAGNOSIS — E11.69 TYPE 2 DIABETES MELLITUS WITH OTHER SPECIFIED COMPLICATION, WITHOUT LONG-TERM CURRENT USE OF INSULIN (H): ICD-10-CM

## 2022-11-07 DIAGNOSIS — E11.69 TYPE 2 DIABETES MELLITUS WITH OTHER SPECIFIED COMPLICATION, WITHOUT LONG-TERM CURRENT USE OF INSULIN (H): Primary | ICD-10-CM

## 2022-11-07 PROCEDURE — 99606 MTMS BY PHARM EST 15 MIN: CPT | Performed by: PHARMACIST

## 2022-11-07 RX ORDER — DULAGLUTIDE 1.5 MG/.5ML
INJECTION, SOLUTION SUBCUTANEOUS
Qty: 2 ML | Refills: 0 | Status: SHIPPED | OUTPATIENT
Start: 2022-11-07 | End: 2022-12-06

## 2022-11-07 NOTE — PATIENT INSTRUCTIONS
"Recommendations from today's MTM visit:                                                         1. Keep up the great work!    Let me know if you need anything from our end!    Follow-up: Return in about 3 months (around 2/7/2023) for Phone call with MTM.    It was great speaking with you today.  I value your experience and would be very thankful for your time in providing feedback in our clinic survey. In the next few days, you may receive an email or text message from [facility] [employer] with a link to a survey related to your  clinical pharmacist.\"     My Clinical Pharmacist's contact information:                                                      Please feel free to contact me with any questions or concerns you have.      Ema Mcleod, Pharm.D, [facility]  Medication Therapy Management Pharmacist  [phone]     "

## 2022-11-07 NOTE — TELEPHONE ENCOUNTER
Trulicity.  Last addressed today VV with Ema.   Hemoglobin A1C   Date Value Ref Range Status   10/06/2022 6.2 (A) 4.0 - 6.0 % Final   06/14/2022 7.2 (A) 4.0 - 6.0 % Final   10/13/2021 6.7 (A) 4.0 - 6.0 % Final      Type 2 Diabetes: Patient is meeting A1c goal of < 7%. Discussed pros/cons to increasing Trulicity dose and given her sugar control would like to keep dose the same for now.

## 2022-11-07 NOTE — PROGRESS NOTES
Medication Therapy Management (MTM) Encounter    ASSESSMENT:                            Medication Adherence/Access: No issues identified    Type 2 Diabetes: Patient is meeting A1c goal of < 7%. Discussed pros/cons to increasing Trulicity dose and given her sugar control would like to keep dose the same for now.       PLAN:                            1. No changes    Follow-up: Return in about 3 months (around 2/7/2023) for Phone call with MTM.    SUBJECTIVE/OBJECTIVE:                          Leticia Tompkins is a 62 year old female called for a follow-up visit.  Today's visit is a follow-up MTM visit from 8/23     Reason for visit: check in on medication.    Allergies/ADRs: Reviewed in chart  Past Medical History: Reviewed in chart  Tobacco: She reports that she quit smoking about 39 years ago. Her smoking use included cigarettes. She has never used smokeless tobacco.  Alcohol: not currently using    Medication Adherence/Access: no issues reported    Type 2 Diabetes: Currently taking metformin ER 1500mg daily and Trulicity 1.5mg weekly. (mounjaro was not covered) Patient is not experiencing side effects. Feels that the medication appetite effects have slightly decreased lately, but overall very happy with the sugars and decrease in weight 6lbs.   Blood sugar monitoring: never. Ranges (patient reported): none  Symptoms of low blood sugar? none  Symptoms of high blood sugar? none  Aspirin: Not taking  Statin: Yes: rosuvastatin   ACEi/ARB: No.    Lab Results   Component Value Date    A1C 6.2 10/06/2022    A1C 7.2 06/14/2022    A1C 6.7 10/13/2021    A1C 6.8 07/13/2021    A1C 7.1 05/10/2021       ----------------      I spent 10 minutes with this patient today. All changes were made via collaborative practice agreement with Lc Michael MD. A copy of the visit note was provided to the patient's provider(s).    The patient was sent via [employer] a summary of these recommendations.     Ema Mcleod, Pharm.D,  BCA  Medication Therapy Management Pharmacist  [phone]      Telemedicine Visit Details  Type of service:  Telephone visit  Start Time: 10:49 AM   End Time: 10:59 AM  Originating Location (pt. Location): Home   Distant Location (provider location):  On-site  Provider has received verbal consent for a visit from the patient? Yes     Medication Therapy Recommendations  No medication therapy recommendations to display

## 2022-12-06 DIAGNOSIS — E11.69 TYPE 2 DIABETES MELLITUS WITH OTHER SPECIFIED COMPLICATION, WITHOUT LONG-TERM CURRENT USE OF INSULIN (H): ICD-10-CM

## 2022-12-06 RX ORDER — DULAGLUTIDE 1.5 MG/.5ML
INJECTION, SOLUTION SUBCUTANEOUS
Qty: 2 ML | Refills: 1 | Status: SHIPPED | OUTPATIENT
Start: 2022-12-06 | End: 2023-01-30

## 2022-12-06 NOTE — TELEPHONE ENCOUNTER
Trulicity. LOV 6/21/22.    Type 2 diabetes mellitus with other specified complication, without long-term current use of insulin (H)  As a direct cause of their history of diabetes they have the following Diabetic complications: svt may have been related but nothing for 11 months.  Hemoglobin A1C   Date Value Ref Range Status   10/06/2022 6.2 (A) 4.0 - 6.0 % Final   Kidney studies:        Creatinine   Date Value Ref Range Status   06/14/2022 0.75 0.57 - 1.00 mg/dL Final   05/10/2021 0.78 0.57 - 1.00 mg/dL Final   01/10/2020 0.75 0.57 - 1.00 mg/dL Final   ]                                           GFR Estimate   Date Value Ref Range Status   03/08/2018 >90 >60 mL/min/1.7m2 Final       Comment:       Non  GFR Calc                                                No components found for: MICORALBUMINLC                                                                 GFR Estimate If Black   Date Value Ref Range Status   03/08/2018 >90 >60 mL/min/1.7m2 Final       Comment:        GFR Calc

## 2023-01-25 DIAGNOSIS — E11.69 TYPE 2 DIABETES MELLITUS WITH OTHER SPECIFIED COMPLICATION, WITHOUT LONG-TERM CURRENT USE OF INSULIN (H): ICD-10-CM

## 2023-01-25 DIAGNOSIS — E78.5 HYPERLIPIDEMIA LDL GOAL <100: ICD-10-CM

## 2023-01-25 RX ORDER — ROSUVASTATIN CALCIUM 10 MG/1
10 TABLET, COATED ORAL DAILY
Qty: 90 TABLET | Refills: 1 | Status: SHIPPED | OUTPATIENT
Start: 2023-01-25 | End: 2023-07-17

## 2023-01-25 NOTE — TELEPHONE ENCOUNTER
Patient called to request refill for rosuvastatin to pharmacy. Sent per auth from Dr. Michael. Planned follow up around Feb with MTM.    Ema Mcleod, Pharm.D, [facility]  Medication Therapy Management Pharmacist  [phone]

## 2023-01-30 DIAGNOSIS — E11.69 TYPE 2 DIABETES MELLITUS WITH OTHER SPECIFIED COMPLICATION, WITHOUT LONG-TERM CURRENT USE OF INSULIN (H): ICD-10-CM

## 2023-01-30 RX ORDER — DULAGLUTIDE 1.5 MG/.5ML
INJECTION, SOLUTION SUBCUTANEOUS
Qty: 2 ML | Refills: 1 | Status: SHIPPED | OUTPATIENT
Start: 2023-01-30 | End: 2023-03-21 | Stop reason: DRUGHIGH

## 2023-02-05 ENCOUNTER — HEALTH MAINTENANCE LETTER (OUTPATIENT)
Age: 63
End: 2023-02-05

## 2023-02-23 NOTE — PATIENT INSTRUCTIONS
"Recommendations from today's MTM visit:                                                       1. Start bupropion ER 150mg daily with breakfast. Continue sertraline 100mg daily at bedtime.     2. Future consideration would be to increase Trulicity 3mg weekly to aid in weight profile if needed.       Follow-up: Return in 3 weeks (on 3/21/2023).    It was great speaking with you today.  I value your experience and would be very thankful for your time in providing feedback in our clinic survey. In the next few days, you may receive an email or text message from [employer] with a link to a survey related to your  clinical pharmacist.\"     My Clinical Pharmacist's contact information:                                                      Please feel free to contact me with any questions or concerns you have.      Ema Mcleod, Pharm.D, [facility]  Medication Therapy Management Pharmacist  [phone]     "

## 2023-02-23 NOTE — PROGRESS NOTES
Medication Therapy Management (MTM) Encounter    ASSESSMENT:                            Medication Adherence/Access: No issues identified    Headaches: could be related to medication, however also could be a variety of factors and will be seeing provider tomorrow for evaluation as well.     Type 2 Diabetes: Patient is meeting A1c goal of < 7%. Patient would benefit from updated a1c tomorrow. Could consider dose increase of Trulicity to help with weight loss efforts however given changes below, holding off on this today.    Hyperlipidemia: Stable.     Depression: Recommend starting additional medication with bupropion to help in boosting energy and mood related to depressive symptoms. Additionally this may benefit with weight loss efforts as well. Discussed pros/cons to using a monotherapy (has done in the past) vs adding to selective serotonin reuptake inhibitor therapy and would prefer to add this to current therapy for additional mood benefits.     Supplements: Stable.    PLAN:                            1. Start bupropion ER 150mg daily with breakfast. Continue sertraline 100mg daily at bedtime.     2. Future consideration would be to increase Trulicity 3mg weekly to aid in weight profile if needed.     Follow-up: Return in 3 weeks (on 3/21/2023).    SUBJECTIVE/OBJECTIVE:                          Leticia Tompkins is a 62 year old female called for a follow-up visit.  Today's visit is a follow-up MTM visit from 11/7/22   Reason for visit: First visit of the year. A1c tomorrow.     Allergies/ADRs: Reviewed in chart  Past Medical History: Reviewed in chart  Tobacco: She reports that she quit smoking about 39 years ago. Her smoking use included cigarettes. She has never used smokeless tobacco.  Alcohol: limited  Caffeine 2 cups in the morning.     Medication Adherence/Access: no issues reported    Headaches: Morning Headaches daily- minor, goes away. Behind eyebrows. For several months. No new pillows/bed. Since menopause  did not have issues with headaches until a few months ago.  Not using any medication for it.     Type 2 Diabetes: Currently taking metformin ER 500mg AM and 1000mg PM and Trulicity 1.5mg weekly.   First started on GLP1 agonist, was working well and lost 4lbs, but now the weight is back up. Appetite suppression has gone away as well.   Before back [place]- threw up once.   Patient is not experiencing side effects.   Blood sugar monitoring: never. Ranges (patient reported): none  Symptoms of low blood sugar? none  Symptoms of high blood sugar? none  Aspirin: Not taking  Statin: Yes: rosuvastatin   ACEi/ARB: No.    Lab Results   Component Value Date    A1C 6.2 10/06/2022    A1C 7.2 06/14/2022    A1C 6.7 10/13/2021    A1C 6.8 07/13/2021    A1C 7.1 05/10/2021     Wt Readings from Last 4 Encounters:   06/21/22 161 lb 6.4 oz (73.2 kg)   10/13/21 162 lb 12.8 oz (73.8 kg)   07/13/21 158 lb 9 oz (71.9 kg)   06/14/21 158 lb 9.6 oz (71.9 kg)       Hyperlipidemia: Current therapy includes rosuvastatin 10mg daily.  Patient reports no significant myalgias or other side effects.         Depression:  Current medications include: Sertraline 100mg once daily (increased from 50mg). Patient reports that depression symptoms are unchanged, feeling very flat, blah. No anxiety noted.   Exercise in the morning (swim/walk). Sleeping okay. 10am starts to see energy decline.   PHQ 11/30/2021 6/21/2022 9/1/2022   PHQ-9 Total Score 5 10 4   Q9: Thoughts of better off dead/self-harm past 2 weeks Not at all Not at all Not at all       Supplements: Currently taking MV daily, Fish oil, Mag, Vitamin D. No reported issues at this time.     ----------------    I spent 25 minutes with this patient today. All changes were made via collaborative practice agreement with Lc Michael MD. A copy of the visit note was provided to the patient's provider(s).    A summary of these recommendations was sent via [employer].    Ema Mcleod, Pharm.D,  [facility]  Medication Therapy Management Pharmacist  [phone]      Telemedicine Visit Details  Type of service:  Telephone visit  Start Time: 9:03 AM  End Time: 9:28 AM     Medication Therapy Recommendations  Mood disorder (H)    Current Medication: buPROPion (WELLBUTRIN XL) 150 MG 24 hr tablet   Rationale: Synergistic therapy - Needs additional medication therapy - Indication   Recommendation: Start Medication - buPROPion 150 MG 24 hr tablet - start medication   Status: Accepted per CPA

## 2023-02-28 ENCOUNTER — VIRTUAL VISIT (OUTPATIENT)
Dept: PHARMACY | Facility: PHYSICIAN GROUP | Age: 63
End: 2023-02-28
Payer: COMMERCIAL

## 2023-02-28 DIAGNOSIS — E11.69 TYPE 2 DIABETES MELLITUS WITH OTHER SPECIFIED COMPLICATION, WITHOUT LONG-TERM CURRENT USE OF INSULIN (H): Primary | ICD-10-CM

## 2023-02-28 DIAGNOSIS — Z78.9 TAKES DIETARY SUPPLEMENTS: ICD-10-CM

## 2023-02-28 DIAGNOSIS — R51.9 CHRONIC DAILY HEADACHE: ICD-10-CM

## 2023-02-28 DIAGNOSIS — F39 MOOD DISORDER (H): ICD-10-CM

## 2023-02-28 DIAGNOSIS — E78.5 HYPERLIPIDEMIA LDL GOAL <100: ICD-10-CM

## 2023-02-28 PROCEDURE — 99607 MTMS BY PHARM ADDL 15 MIN: CPT | Performed by: PHARMACIST

## 2023-02-28 PROCEDURE — 99605 MTMS BY PHARM NP 15 MIN: CPT | Performed by: PHARMACIST

## 2023-02-28 RX ORDER — BUPROPION HYDROCHLORIDE 150 MG/1
150 TABLET ORAL EVERY MORNING
Qty: 30 TABLET | Refills: 0 | Status: SHIPPED | OUTPATIENT
Start: 2023-02-28 | End: 2023-03-21

## 2023-02-28 ASSESSMENT — PATIENT HEALTH QUESTIONNAIRE - PHQ9
SUM OF ALL RESPONSES TO PHQ QUESTIONS 1-9: 12
10. IF YOU CHECKED OFF ANY PROBLEMS, HOW DIFFICULT HAVE THESE PROBLEMS MADE IT FOR YOU TO DO YOUR WORK, TAKE CARE OF THINGS AT HOME, OR GET ALONG WITH OTHER PEOPLE: SOMEWHAT DIFFICULT
SUM OF ALL RESPONSES TO PHQ QUESTIONS 1-9: 12

## 2023-03-01 ENCOUNTER — OFFICE VISIT (OUTPATIENT)
Dept: FAMILY MEDICINE | Facility: CLINIC | Age: 63
End: 2023-03-01

## 2023-03-01 VITALS
HEIGHT: 65 IN | WEIGHT: 159.4 LBS | SYSTOLIC BLOOD PRESSURE: 130 MMHG | HEART RATE: 78 BPM | BODY MASS INDEX: 26.56 KG/M2 | OXYGEN SATURATION: 96 % | DIASTOLIC BLOOD PRESSURE: 78 MMHG

## 2023-03-01 DIAGNOSIS — D50.9 MICROCYTIC ANEMIA: ICD-10-CM

## 2023-03-01 DIAGNOSIS — E11.69 TYPE 2 DIABETES MELLITUS WITH OTHER SPECIFIED COMPLICATION, WITHOUT LONG-TERM CURRENT USE OF INSULIN (H): Primary | ICD-10-CM

## 2023-03-01 DIAGNOSIS — Z86.79 HISTORY OF SUPRAVENTRICULAR TACHYCARDIA: ICD-10-CM

## 2023-03-01 DIAGNOSIS — F39 MOOD DISORDER (H): ICD-10-CM

## 2023-03-01 PROBLEM — I47.10 SUPRAVENTRICULAR TACHYCARDIA (H): Status: RESOLVED | Noted: 2021-05-18 | Resolved: 2023-03-01

## 2023-03-01 LAB
% GRANULOCYTES: 56.4 % (ref 42.2–75.2)
HBA1C MFR BLD: 6.4 % (ref 4–6)
HCT VFR BLD AUTO: 32.1 % (ref 35–46)
HEMOGLOBIN: 10.9 G/DL (ref 11.8–15.5)
LYMPHOCYTES NFR BLD AUTO: 34.5 % (ref 20.5–51.1)
MCH RBC QN AUTO: 24.8 PG (ref 27–31)
MCHC RBC AUTO-ENTMCNC: 34.1 G/DL (ref 33–37)
MCV RBC AUTO: 72.7 FL (ref 80–100)
MONOCYTES NFR BLD AUTO: 9.1 % (ref 1.7–9.3)
PLATELET # BLD AUTO: 527 K/UL (ref 140–450)
RBC # BLD AUTO: 4.42 X10/CMM (ref 3.7–5.2)
WBC # BLD AUTO: 5.2 X10/CMM (ref 3.8–11)

## 2023-03-01 PROCEDURE — 99214 OFFICE O/P EST MOD 30 MIN: CPT | Performed by: FAMILY MEDICINE

## 2023-03-01 PROCEDURE — 85025 COMPLETE CBC W/AUTO DIFF WBC: CPT | Performed by: FAMILY MEDICINE

## 2023-03-01 PROCEDURE — 36415 COLL VENOUS BLD VENIPUNCTURE: CPT | Performed by: FAMILY MEDICINE

## 2023-03-01 PROCEDURE — 83036 HEMOGLOBIN GLYCOSYLATED A1C: CPT | Performed by: FAMILY MEDICINE

## 2023-03-01 PROCEDURE — 99207 PR FOOT EXAM NO CHARGE: CPT | Mod: 25 | Performed by: FAMILY MEDICINE

## 2023-03-01 ASSESSMENT — ANXIETY QUESTIONNAIRES
IF YOU CHECKED OFF ANY PROBLEMS ON THIS QUESTIONNAIRE, HOW DIFFICULT HAVE THESE PROBLEMS MADE IT FOR YOU TO DO YOUR WORK, TAKE CARE OF THINGS AT HOME, OR GET ALONG WITH OTHER PEOPLE: SOMEWHAT DIFFICULT
6. BECOMING EASILY ANNOYED OR IRRITABLE: SEVERAL DAYS
2. NOT BEING ABLE TO STOP OR CONTROL WORRYING: NOT AT ALL
3. WORRYING TOO MUCH ABOUT DIFFERENT THINGS: NOT AT ALL
GAD7 TOTAL SCORE: 1
5. BEING SO RESTLESS THAT IT IS HARD TO SIT STILL: NOT AT ALL
7. FEELING AFRAID AS IF SOMETHING AWFUL MIGHT HAPPEN: NOT AT ALL
GAD7 TOTAL SCORE: 1
1. FEELING NERVOUS, ANXIOUS, OR ON EDGE: NOT AT ALL

## 2023-03-01 ASSESSMENT — PATIENT HEALTH QUESTIONNAIRE - PHQ9: 5. POOR APPETITE OR OVEREATING: NOT AT ALL

## 2023-03-01 NOTE — PROGRESS NOTES
Not well,  No energy  Starting wellbutrin yesterday  So flat....    Depression:  Has known history of depression.  Has been on medication for this.  The patient does not report any significant side effects of this medication.  The prior symptoms leading to the original diagnosis and decision to start medication therapy are not yet optimal.     Appetite is stable.  Sleeping patterns are stable.  No reported thoughts of suicide or homicide.  Last PHQ-9 score on record=   PHQ-9 SCORE 5/18/2021 6/14/2021 11/30/2021 6/21/2022 9/1/2022 2/28/2023   [facility] - 5 (Mild depression) - 4 (Minimal depression) 12 (Moderate depression)   PHQ-9 Total Score 13 11 5 10 4 12     As a direct cause of their history of diabetes they have the following Diabetic complications: none  Most  recent A1C:     The last available A1C values from [facility] reference lab, [facility]:  No components found for: [id number]     Lab Results   Component Value Date    A1C 6.4 03/01/2023    A1C 6.2 10/06/2022    A1C 7.2 06/14/2022    History   Smoking Status     Former     Types: Cigarettes     Quit date: 3/12/1983   Smokeless Tobacco     Never    @     Kidney studies:  Creatinine   Date Value Ref Range Status   06/14/2022 0.75 0.57 - 1.00 mg/dL Final   05/10/2021 0.78 0.57 - 1.00 mg/dL Final   01/10/2020 0.75 0.57 - 1.00 mg/dL Final   ]    GFR Estimate   Date Value Ref Range Status   03/08/2018 >90 >60 mL/min/1.7m2 Final     Comment:     Non  GFR Calc                No components found for: MICORALBUMINLC      GFR Estimate If Black   Date Value Ref Range Status   03/08/2018 >90 >60 mL/min/1.7m2 Final     Comment:      GFR Calc                Discussed importance in compliance in all areas of diabetic control including diet, routine BS checks, absolute medication compliance, laboratory monitoring, and attending regular follow up appointments as ordered.  Reviewed that failure to comply with instructions regarding  diabetes will lead to a greater chance of poor diabetic control and therefore a greater chance of diabetes related complications such as CAD, CVA, PVD, and retinopathy/neuropathy/nephropathy.  We today managed prescriptions with refills ensured to ensure appropriate availability of current medications.  Next follow up will be in 6 months.   told she couldn't donate blood due to anemia.  Wondering about b12    Problem(s) Oriented visit      ROS:  General and Resp. completed and negative except as noted above     HISTORY:   reports current alcohol use.   reports that she quit smoking about 39 years ago. Her smoking use included cigarettes. She has never used smokeless tobacco.    Past Medical History:   Diagnosis Date     Depressive disorder, not elsewhere classified 1997     Lumbago      Spider veins      Tachycardia, unspecified 2004     Past Surgical History:   Procedure Laterality Date     BLADDER SURGERY       CYSTOCELE REPAIR N/A 0/2013     HYSTERECTOMY N/A 05/23/20014    Supracervical. See Path via Care Everywhere at [facility].      HYSTERECTOMY, PAP NO LONGER INDICATED Bilateral 2014     ORTHOPEDIC SURGERY Left 04/19/2016    left foot bunionectomy     ORTHOPEDIC SURGERY      Foot surgery      RECTOCELE REPAIR N/A 05/2013     [facility] NONSPECIFIC PROCEDURE  1991    Tubal ligation     [facility] NONSPECIFIC PROCEDURE      Lasik eye surgery     [facility] NONSPECIFIC PROCEDURE  03/2007     L5-S1 discectomy       EXAM:  BP: 130/78   Pulse: 78    Temp: Data Unavailable    Wt Readings from Last 2 Encounters:   03/01/23 72.3 kg (159 lb 6.4 oz)   06/21/22 73.2 kg (161 lb 6.4 oz)       BMI= Body mass index is 26.94 kg/m .    EXAM:  APPEARANCE: = Relaxed and in no distress  Thyroid = Not enlarged and no masses felt  Breath Sounds = Good air movement with no rales or rhonchi in any lung fields  Digits and Nails = FROM in all finger joints, no nail dystrophy  Foot exam = Monofilament exam is with appropriate sensation      Assessment/Plan:  Leticia  "was seen today for diabetes and depression.    Diagnoses and all orders for this visit:    Type 2 diabetes mellitus with other specified complication, without long-term current use of insulin (H)  Doing well here  -     Hemoglobin A1C ([facility])  -     VENOUS COLLECTION    Depression (H)  Spoke at length about mood disorders including suspected pathophysiology, role of neurotransmitters, and treatment options including medication options ( especially SSRIs that treat cause of sx) and counselling.  Tolerating current meds. We discussed ongoing management of her  medications and how we will refill the medications now and in the future.    Anemia, unspecified type  Check B12 and hgb  History of supraventricular tachycardia  No recent symptoms.      COUNSELING:   reports that she quit smoking about 39 years ago. Her smoking use included cigarettes. She has never used smokeless tobacco.    Estimated body mass index is 26.94 kg/m  as calculated from the following:    Height as of this encounter: 1.638 m (5' 4.5\").    Weight as of this encounter: 72.3 kg (159 lb 6.4 oz).       Appropriate preventive services were discussed with this patient, including applicable screening as appropriate for cardiovascular disease, diabetes, osteopenia/osteoporosis, and glaucoma.  As appropriate for age/gender, discussed screening for colorectal cancer, prostate cancer, breast cancer, and cervical cancer. Checklist reviewing preventive services available has been given to the patient.    Reviewed patients plan of care and provided an AVS. The Basic Care Plan (routine screening as documented in Health Maintenance) for Leticia meets the Care Plan requirement. This Care Plan has been established and reviewed with the  Patient.      The following health maintenance items are reviewed in Epic and correct as of today:  Health Maintenance   Topic Date Due     ZOSTER IMMUNIZATION (1 of 2) Never done     PAP  12/03/2021     DIABETIC FOOT EXAM  05/18/2022 "     COLORECTAL CANCER SCREENING  03/26/2023     A1C  06/01/2023     BMP  06/14/2023     LIPID  06/14/2023     MICROALBUMIN  06/14/2023     YEARLY PREVENTIVE VISIT  06/21/2023     PHQ-9  09/01/2023     EYE EXAM  08/25/2024     MAMMO SCREENING  11/04/2024     DTAP/TDAP/TD IMMUNIZATION (3 - Td or Tdap) 04/05/2026     ADVANCE CARE PLANNING  05/18/2026     HEPATITIS C SCREENING  Completed     HIV SCREENING  Completed     DEPRESSION ACTION PLAN  Completed     INFLUENZA VACCINE  Completed     Pneumococcal Vaccine: Pediatrics (0 to 5 Years) and At-Risk Patients (6 to 64 Years)  Completed     COVID-19 Vaccine  Completed     IPV IMMUNIZATION  Aged Out     MENINGITIS IMMUNIZATION  Aged Out       Lc Michael  [facility]  For any issues my office # is [phone]            Answers for HPI/ROS submitted by the patient on 2/28/2023  If you checked off any problems, how difficult have these problems made it for you to do your work, take care of things at home, or get along with other people?: Somewhat difficult  PHQ9 TOTAL SCORE: 12  Frequency of checking blood sugars:: not at all  Diabetic concerns:: none  Paraesthesia present:: none of these symptoms  Depression/Anxiety: Depression  Status since last visit:: medium  Other associated symptoms of depression:: Yes  Significant life event: : No  Anxious:: No  Current substance use:: No  How many servings of fruits and vegetables do you eat daily?: 2-3  On average, how many sweetened beverages do you drink each day (Examples: soda, juice, sweet tea, etc.  Do NOT count diet or artificially sweetened beverages)?: 0  How many minutes a day do you exercise enough to make your heart beat faster?: 30 to 60  How many days a week do you exercise enough to make your heart beat faster?: 6  How many days per week do you miss taking your medication?: 0

## 2023-03-02 LAB
FOLATE: >20 NG/ML
VIT B12 SERPL-MCNC: 516 PG/ML (ref 232–1245)

## 2023-03-03 LAB
FERRITIN SERPL-MCNC: 9 NG/ML (ref 15–150)
IRON BINDING CAP: 439 UG/DL (ref 250–450)
IRON SATURATION: 7 % (ref 15–55)
IRON: 31 UG/DL (ref 27–139)
SPECIMEN STATUS REPORT: NORMAL
UIBC: 408 UG/DL (ref 118–369)

## 2023-03-06 ENCOUNTER — MYC MEDICAL ADVICE (OUTPATIENT)
Dept: FAMILY MEDICINE | Facility: CLINIC | Age: 63
End: 2023-03-06

## 2023-03-06 DIAGNOSIS — D50.9 ANEMIA, IRON DEFICIENCY: ICD-10-CM

## 2023-03-06 DIAGNOSIS — Z12.11 SCREEN FOR COLON CANCER: Primary | ICD-10-CM

## 2023-03-08 RX ORDER — FERROUS SULFATE 325(65) MG
325 TABLET ORAL
COMMUNITY
Start: 2023-03-08 | End: 2023-07-17

## 2023-03-08 NOTE — TELEPHONE ENCOUNTER
Called and spoke with patient regarding lab results per Dr Michael. Order for colonoscopy entered to [facility]. Evette Zambrano

## 2023-03-08 NOTE — TELEPHONE ENCOUNTER
----- Message from Lc Michael MD sent at 3/5/2023  1:04 PM CST -----  Can we let her know of the new iron def. Anemia and can we get her started on Iron and move to order her Colonoscopy?  KN

## 2023-03-17 DIAGNOSIS — F39 MOOD DISORDER (H): ICD-10-CM

## 2023-03-17 RX ORDER — SERTRALINE HYDROCHLORIDE 100 MG/1
TABLET, FILM COATED ORAL
Qty: 90 TABLET | Refills: 1 | Status: SHIPPED | OUTPATIENT
Start: 2023-03-17 | End: 2023-11-06

## 2023-03-21 ENCOUNTER — VIRTUAL VISIT (OUTPATIENT)
Dept: PHARMACY | Facility: PHYSICIAN GROUP | Age: 63
End: 2023-03-21
Payer: COMMERCIAL

## 2023-03-21 DIAGNOSIS — D50.9 IRON DEFICIENCY ANEMIA, UNSPECIFIED IRON DEFICIENCY ANEMIA TYPE: ICD-10-CM

## 2023-03-21 DIAGNOSIS — F39 MOOD DISORDER (H): ICD-10-CM

## 2023-03-21 DIAGNOSIS — E11.69 TYPE 2 DIABETES MELLITUS WITH OTHER SPECIFIED COMPLICATION, WITHOUT LONG-TERM CURRENT USE OF INSULIN (H): Primary | ICD-10-CM

## 2023-03-21 PROCEDURE — 99606 MTMS BY PHARM EST 15 MIN: CPT | Performed by: PHARMACIST

## 2023-03-21 PROCEDURE — 99607 MTMS BY PHARM ADDL 15 MIN: CPT | Performed by: PHARMACIST

## 2023-03-21 RX ORDER — BUPROPION HYDROCHLORIDE 150 MG/1
150 TABLET ORAL EVERY MORNING
Qty: 90 TABLET | Refills: 0 | Status: SHIPPED | OUTPATIENT
Start: 2023-03-21 | End: 2023-06-25

## 2023-03-21 RX ORDER — DULAGLUTIDE 3 MG/.5ML
3 INJECTION, SOLUTION SUBCUTANEOUS WEEKLY
Qty: 2 ML | Refills: 0 | Status: SHIPPED | OUTPATIENT
Start: 2023-03-21 | End: 2023-05-02

## 2023-03-21 NOTE — PROGRESS NOTES
Medication Therapy Management (MTM) Encounter    ASSESSMENT:                            Medication Adherence/Access: No issues identified    Anemia: continue on replacement and recheck labs in 6 weeks.     Depression: Stable.    Type 2 Diabetes: Patient is meeting A1c goal of < 7%. Patient would benefit from increase in Trulicity to 3mg weekly to help with further appetite suppression.    PLAN:                            1. Will do 1.5mg Trulicity this week, then next week take 2 shots of the 1.5mg on opposite sides of the abdomen, then the following week can increase to the 3mg weekly pen.     2. Continue bupropion.     3. Recheck iron labs in May.     Follow-up: Return in 6 weeks (on 5/2/2023) for MTM visit in clinic.    SUBJECTIVE/OBJECTIVE:                          Leticia Tompkins is a 62 year old female called for a follow-up visit.  Today's visit is a follow-up MTM visit from 2/28     Reason for visit: Medication follow up.    Allergies/ADRs: Reviewed in chart  Past Medical History: Reviewed in chart  Tobacco: She reports that she quit smoking about 40 years ago. Her smoking use included cigarettes. She has never used smokeless tobacco.  Alcohol: not currently using    Medication Adherence/Access: no issues reported    Anemia:   Iron daily - doing okay, slight constipation, but managing okay. No nausea.    Low level on 3/1/23, so started on replacement.   Frequently donating blood prior. No history of lower hemoglobin. No s/s of bleeding.     Depression:    Sertraline 100mg once daily at night  Bupropion ER 150mg daily AM (started on 2/28)- started to see if would help from energy, mood and weight standpoint- feels this has help lift up mood. No change in appetite.   Exercise in the morning (swim/walk). Sleeping okay.    Type 2 Diabetes:   metformin ER 500mg AM and 1000mg PM   Trulicity 1.5mg weekly - First started on GLP1 agonist, was working well and lost 4lbs, but now the weight is back up. Appetite suppression  has gone away as well. Did not adjust dose last time due to change with bupropion, but would be open to adjustment not.  Before back [place]- threw up once, no other nausea or vomiting since.   Patient is not experiencing side effects.   Blood sugar monitoring: never. Ranges (patient reported): none  Symptoms of low blood sugar? none  Symptoms of high blood sugar? none  Aspirin: Not taking  Statin: Yes: rosuvastatin   ACEi/ARB: No.    Lab Results   Component Value Date    A1C 6.4 03/01/2023    A1C 6.2 10/06/2022    A1C 7.2 06/14/2022    A1C 6.7 10/13/2021    A1C 6.8 07/13/2021       ----------------    I spent 13 minutes with this patient today. All changes were made via collaborative practice agreement with Lc Michael MD. A copy of the visit note was provided to the patient's provider(s).    A summary of these recommendations was sent via [employer].    Ema Mcleod, Pharm.D, [facility]  Medication Therapy Management Pharmacist  [phone]      Telemedicine Visit Details  Type of service:  Telephone visit  Start Time: 9:06 AM  End Time: 9:19 AM     Medication Therapy Recommendations  Type 2 diabetes mellitus with other specified complication, without long-term current use of insulin (H)    Current Medication: TRULICITY 1.5 MG/0.5ML pen (Discontinued)   Rationale: Dose too low - Dosage too low - Effectiveness   Recommendation: Increase Dose - Trulicity 3 MG/0.5ML Sopn   Status: Accepted per CPA

## 2023-03-21 NOTE — PATIENT INSTRUCTIONS
"Recommendations from today's MTM visit:                                                     1. Will do 1.5mg Trulicity this week, then next week take 2 shots of the 1.5mg on opposite sides of the abdomen, then the following week can increase to the 3mg weekly pen.     2. Continue bupropion.     3. Recheck iron labs in May.       Follow-up: Return in 6 weeks (on 5/2/2023) for MTM visit in clinic.    It was great speaking with you today.  I value your experience and would be very thankful for your time in providing feedback in our clinic survey. In the next few days, you may receive an email or text message from [employer] with a link to a survey related to your  clinical pharmacist.\"     My Clinical Pharmacist's contact information:                                                      Please feel free to contact me with any questions or concerns you have.      Ema Mcleod, Pharm.D, [facility]  Medication Therapy Management Pharmacist  [phone]      "

## 2023-05-29 DIAGNOSIS — E11.69 TYPE 2 DIABETES MELLITUS WITH OTHER SPECIFIED COMPLICATION, WITHOUT LONG-TERM CURRENT USE OF INSULIN (H): ICD-10-CM

## 2023-05-29 RX ORDER — DULAGLUTIDE 3 MG/.5ML
INJECTION, SOLUTION SUBCUTANEOUS
Qty: 2 ML | Refills: 0 | Status: SHIPPED | OUTPATIENT
Start: 2023-05-29 | End: 2023-06-23

## 2023-06-23 DIAGNOSIS — F39 MOOD DISORDER (H): ICD-10-CM

## 2023-06-23 DIAGNOSIS — E11.69 TYPE 2 DIABETES MELLITUS WITH OTHER SPECIFIED COMPLICATION, WITHOUT LONG-TERM CURRENT USE OF INSULIN (H): ICD-10-CM

## 2023-06-23 NOTE — TELEPHONE ENCOUNTER
Bupropion    LOV 3/1/23  BP Readings from Last 3 Encounters:   03/01/23 130/78   06/21/22 128/79   10/13/21 130/85

## 2023-06-23 NOTE — TELEPHONE ENCOUNTER
TRULICITY 3 MG/0.5ML SOPN    LOV & labs 3/1/23    Lab Results   Component Value Date    A1C 6.4 03/01/2023    A1C 6.2 10/06/2022    A1C 7.2 06/14/2022    A1C 6.7 10/13/2021    A1C 6.8 07/13/2021     Last notes  Leticia was seen today for diabetes and depression.     Diagnoses and all orders for this visit:     Type 2 diabetes mellitus with other specified complication, without long-term current use of insulin (H)  Doing well here  -     Hemoglobin A1C ([facility])  -     VENOUS COLLECTION     Depression (H)  Spoke at length about mood disorders including suspected pathophysiology, role of neurotransmitters, and treatment options including medication options ( especially SSRIs that treat cause of sx) and counselling.  Tolerating current meds. We discussed ongoing management of her  medications and how we will refill the medications now and in the future.     Anemia, unspecified type  Check B12 and hgb  History of supraventricular

## 2023-06-25 RX ORDER — DULAGLUTIDE 3 MG/.5ML
INJECTION, SOLUTION SUBCUTANEOUS
Qty: 2 ML | Refills: 1 | Status: SHIPPED | OUTPATIENT
Start: 2023-06-25 | End: 2023-07-17 | Stop reason: DRUGHIGH

## 2023-06-25 RX ORDER — BUPROPION HYDROCHLORIDE 150 MG/1
TABLET ORAL
Qty: 90 TABLET | Refills: 0 | Status: SHIPPED | OUTPATIENT
Start: 2023-06-25 | End: 2023-07-17

## 2023-06-30 ENCOUNTER — MYC MEDICAL ADVICE (OUTPATIENT)
Dept: PHARMACY | Facility: PHYSICIAN GROUP | Age: 63
End: 2023-06-30
Payer: COMMERCIAL

## 2023-07-01 DIAGNOSIS — E11.69 TYPE 2 DIABETES MELLITUS WITH OTHER SPECIFIED COMPLICATION, WITHOUT LONG-TERM CURRENT USE OF INSULIN (H): ICD-10-CM

## 2023-07-01 RX ORDER — METFORMIN HCL 500 MG
TABLET, EXTENDED RELEASE 24 HR ORAL
Qty: 270 TABLET | Refills: 3 | Status: SHIPPED | OUTPATIENT
Start: 2023-07-01 | End: 2024-05-23

## 2023-07-17 ENCOUNTER — VIRTUAL VISIT (OUTPATIENT)
Dept: PHARMACY | Facility: PHYSICIAN GROUP | Age: 63
End: 2023-07-17
Payer: COMMERCIAL

## 2023-07-17 DIAGNOSIS — E11.69 TYPE 2 DIABETES MELLITUS WITH OTHER SPECIFIED COMPLICATION, WITHOUT LONG-TERM CURRENT USE OF INSULIN (H): Primary | ICD-10-CM

## 2023-07-17 DIAGNOSIS — E78.5 HYPERLIPIDEMIA LDL GOAL <100: ICD-10-CM

## 2023-07-17 DIAGNOSIS — D50.9 IRON DEFICIENCY ANEMIA, UNSPECIFIED IRON DEFICIENCY ANEMIA TYPE: ICD-10-CM

## 2023-07-17 DIAGNOSIS — F39 MOOD DISORDER (H): ICD-10-CM

## 2023-07-17 PROCEDURE — 99607 MTMS BY PHARM ADDL 15 MIN: CPT | Performed by: PHARMACIST

## 2023-07-17 PROCEDURE — 99606 MTMS BY PHARM EST 15 MIN: CPT | Performed by: PHARMACIST

## 2023-07-17 RX ORDER — ROSUVASTATIN CALCIUM 10 MG/1
10 TABLET, COATED ORAL DAILY
Qty: 90 TABLET | Refills: 1 | Status: SHIPPED | OUTPATIENT
Start: 2023-07-17 | End: 2024-01-14

## 2023-07-17 RX ORDER — BUPROPION HYDROCHLORIDE 150 MG/1
150 TABLET ORAL EVERY MORNING
Qty: 90 TABLET | Refills: 1 | Status: SHIPPED | OUTPATIENT
Start: 2023-07-17 | End: 2024-03-22

## 2023-07-17 NOTE — PATIENT INSTRUCTIONS
"Recommendations from today's MTM visit:                                                       1. Decrease Trulicity back to 1.5mg weekly.     2. Next visit need update iron studies and hgb with A1c and cholesterol check.     Follow-up: Return in about 2 months (around 9/17/2023) for Lab Work, Primary Care Provider, MTM visit in clinic.    It was great speaking with you today.  I value your experience and would be very thankful for your time in providing feedback in our clinic survey. In the next few days, you may receive an email or text message from [employer] with a link to a survey related to your  clinical pharmacist.\"     My Clinical Pharmacist's contact information:                                                      Please feel free to contact me with any questions or concerns you have.      Ema Mcleod, Pharm.D, [facility]  Medication Therapy Management Pharmacist  [phone]      "

## 2023-07-17 NOTE — PROGRESS NOTES
Medication Therapy Management (MTM) Encounter    ASSESSMENT:                            Medication Adherence/Access: No issues identified    Anemia:   Stable, but needs updated labs with next office visit.     Depression:    Stable.     Type 2 Diabetes:   Patient is meeting A1c goal of < 7%. Patient would benefit from lowering dose back down to Trulicity 1.5mg weekly per patient preference of lower rate of side effects and ongoing control of sugars as the higher dose didn't make much difference for weight or appetite.     Hyperlipidemia:   Stable. Due for recheck next office visit.     PLAN:                            1. Decrease Trulicity back to 1.5mg weekly.     2. Next visit need update iron studies and hgb with A1c and cholesterol check.       Follow-up: Return in about 2 months (around 9/17/2023) for Lab Work, Primary Care Provider, MTM visit in clinic.    SUBJECTIVE/OBJECTIVE:                          Leticia Tompkins is a 62 year old female called for a follow-up visit from 3/21.       Reason for visit: medication follow up.    Allergies/ADRs: Reviewed in chart  Past Medical History: Reviewed in chart  Tobacco: She reports that she quit smoking about 40 years ago. Her smoking use included cigarettes. She has never used smokeless tobacco.  Alcohol: not currently using    Medication Adherence/Access: using savings card for Trulicity copay right now.  9/1 - medicare kicks in, so will be using different coverage.     Anemia:   Stopped Iron daily- 90 days on medication but completed this now and is waiting until coverage changes in Sept to come in for recheck.   Low level on 3/1/23, so started on replacement for 90 days.    Frequently donating blood prior. No history of lower hemoglobin. No s/s of bleeding.     Depression:    Sertraline 100mg once daily at night  Bupropion ER 150mg daily AM (started on 2/28)- started to see if would help from energy, mood and weight standpoint- feels this has help lift up mood. No  change in appetite.   Exercise in the morning (swim/walk). Sleeping okay.    Type 2 Diabetes:   metformin ER 500mg AM and 1000mg PM   Trulicity 3mg weekly - going okay, but not sure she has noticed any difference minor nausea.   Patient is not experiencing side effects.   Blood sugar monitoring: never. Ranges (patient reported): none  Symptoms of low blood sugar? none  Symptoms of high blood sugar? none  Aspirin: Not taking  Statin: Yes: rosuvastatin   ACEi/ARB: No.    Weight has remained stable.     Lab Results   Component Value Date    A1C 6.4 03/01/2023    A1C 6.2 10/06/2022    A1C 7.2 06/14/2022    A1C 6.7 10/13/2021    A1C 6.8 07/13/2021     Hyperlipidemia:   rosuvastatin 10mg daily  Patient reports no significant myalgias or other side effects.    Recent Labs   Lab Test 06/14/22  0000 05/10/21  0930   CHOL 195 236*   HDL 76 84    142*   TRIG 46 58       ----------------    I spent 14 minutes with this patient today. All changes were made via collaborative practice agreement with Lc Michael MD. A copy of the visit note was provided to the patient's provider(s).    A summary of these recommendations was sent via [employer].    Ema Mcleod, Pharm.D, [facility]  Medication Therapy Management Pharmacist  [phone]    Telemedicine Visit Details  Type of service:  Telephone visit  Start Time: 9:34 AM  End Time: 9:48 AM     Medication Therapy Recommendations  Type 2 diabetes mellitus with other specified complication, without long-term current use of insulin (H)    Current Medication: TRULICITY 3 MG/0.5ML SOPN (Discontinued)   Rationale: Patient prefers not to take - Adherence - Adherence   Recommendation: Decrease Dose - Trulicity 1.5 MG/0.5ML Sopn - lower dose   Status: Accepted per CPA

## 2023-08-12 ENCOUNTER — HEALTH MAINTENANCE LETTER (OUTPATIENT)
Age: 63
End: 2023-08-12

## 2023-09-12 NOTE — PROGRESS NOTES
Answers submitted by the patient for this visit:  Diabetes Visit (Submitted on 9/11/2023)  Chief Complaint: Chronic problems general questions HPI Form  Frequency of checking blood sugars:: not at all  Diabetic concerns:: none  Paraesthesia present:: weight gain  General Questionnaire (Submitted on 9/11/2023)  Chief Complaint: Chronic problems general questions HPI Form  How many servings of fruits and vegetables do you eat daily?: 2-3  On average, how many sweetened beverages do you drink each day (Examples: soda, juice, sweet tea, etc.  Do NOT count diet or artificially sweetened beverages)?: 0  How many minutes a day do you exercise enough to make your heart beat faster?: 30 to 60  How many days a week do you exercise enough to make your heart beat faster?: 5  How many days per week do you miss taking your medication?: 0  Diabetes Medication(s)       Biguanides       metFORMIN (GLUCOPHAGE XR) 500 MG 24 hr tablet    TAKE 1 TABLET BY MOUTH EVERY MORNING AND 2 TABLETS BY MOUTH EVERY NIGHT AT BEDTIME WITH MEALS      Incretin Mimetic Agents       dulaglutide (TRULICITY) 1.5 MG/0.5ML pen    Inject 1.5 mg Subcutaneous every 7 days          Depression:  Has known history of depression.  Has been on medication for this.  The patient does not report any significant side effects of this medication.  The prior symptoms leading to the original diagnosis and decision to start medication therapy are better.     Appetite is stable.  Sleeping patterns are stable.  No reported thoughts of suicide or homicide.    Last 3 PHQ9 results:      9/1/2022     4:55 PM 2/28/2023     2:34 PM 3/1/2023     8:56 AM 9/13/2023    11:01 AM   PHQ-9 SCORE   PHQ-9 Total Score MyChart 4 (Minimal depression) 12 (Moderate depression)     PHQ-9 Total Score 4 12 11 6   Loves to eat and this is 3 out of today's 6....  Problem(s) Oriented visit      ROS:  General and Resp. completed and negative except as noted above     HISTORY:   reports current alcohol  use.   reports that she quit smoking about 40 years ago. Her smoking use included cigarettes. She has never used smokeless tobacco.    Past Medical History:   Diagnosis Date    Depressive disorder, not elsewhere classified 1997    Lumbago     Spider veins     Tachycardia, unspecified 2004     Past Surgical History:   Procedure Laterality Date    BLADDER SURGERY      CYSTOCELE REPAIR N/A 0/2013    HYSTERECTOMY N/A 05/23/20014    Supracervical. See Path via Care Everywhere at [facility].     HYSTERECTOMY, PAP NO LONGER INDICATED Bilateral 2014    ORTHOPEDIC SURGERY Left 04/19/2016    left foot bunionectomy    ORTHOPEDIC SURGERY      Foot surgery     RECTOCELE REPAIR N/A 05/2013    [facility] NONSPECIFIC PROCEDURE  1991    Tubal ligation    [facility] NONSPECIFIC PROCEDURE      Lasik eye surgery    [facility] NONSPECIFIC PROCEDURE  03/2007     L5-S1 discectomy       EXAM:  BP: 134/88   Pulse: 80    Temp: Data Unavailable    Wt Readings from Last 2 Encounters:   09/13/23 72.3 kg (159 lb 6.4 oz)   03/01/23 72.3 kg (159 lb 6.4 oz)       BMI= Body mass index is 26.94 kg/m .    EXAM:  APPEARANCE: = Relaxed and in no distress  PERRLA/Irises = Pupils Round Reactive to Light and Irisis without inflammation  Lips/Teeth/Gums = No lesions seen, good dentition, and gums seem healthy  Oropharynx = No leukoplakia, No injection to the tissues, Normal Uvula  Neck = No anterior or posterior adenopathy appreciated.  Resp effort = Calm regular breathing  Recent/Remote Memory = Alert and Oriented x 3  Mood/Affect = Cooperative and interested      Assessment/Plan:  Lteicia was seen today for diabetes and health maintenance.    Diagnoses and all orders for this visit:    Type 2 diabetes mellitus with other specified complication, without long-term current use of insulin (H)  As a direct cause of their history of diabetes they have the following Diabetic complications: none  Most  recent A1C:     The last available A1C values from [facility]'s reference lab, [facility]:  No  components found for: [id number]     Lab Results   Component Value Date    A1C 6.2 09/13/2023    A1C 6.4 03/01/2023    A1C 6.2 10/06/2022    History   Smoking Status    Former    Types: Cigarettes    Quit date: 3/12/1983   Smokeless Tobacco    Never    @     Kidney studies:  Creatinine   Date Value Ref Range Status   06/14/2022 0.75 0.57 - 1.00 mg/dL Final   05/10/2021 0.78 0.57 - 1.00 mg/dL Final   01/10/2020 0.75 0.57 - 1.00 mg/dL Final   ]    GFR Estimate   Date Value Ref Range Status   03/08/2018 >90 >60 mL/min/1.7m2 Final     Comment:     Non  GFR Calc                No components found for: MICORALBUMINLC      GFR Estimate If Black   Date Value Ref Range Status   03/08/2018 >90 >60 mL/min/1.7m2 Final     Comment:      GFR Calc                Discussed importance in compliance in all areas of diabetic control including diet, routine BS checks, absolute medication compliance, laboratory monitoring, and attending regular follow up appointments as ordered.  Reviewed that failure to comply with instructions regarding diabetes will lead to a greater chance of poor diabetic control and therefore a greater chance of diabetes related complications such as CAD, CVA, PVD, and retinopathy/neuropathy/nephropathy.  We today managed prescriptions with refills ensured to ensure appropriate availability of current medications.  Next follow up will be in 6 months.    -     Hemoglobin A1C ([facility])  -     Microalbumin ([facility])  -     Basic metabolic panel; Future  -     Lipid Profile ([facility])  -     VENOUS COLLECTION  -     [facility]  Referral - To a [facility] Location (Use POS/Location); Future    Iron deficiency anemia due to chronic blood loss  Was donating lots of blood   -     CBC with Diff/Plt ([facility])    Conductive hearing loss, bilateral  -     Adult Audiology  Referral; Future    Mood disorder  Continue current meds.      COUNSELING:   reports that she quit smoking about 40  "years ago. Her smoking use included cigarettes. She has never used smokeless tobacco.    Estimated body mass index is 26.94 kg/m  as calculated from the following:    Height as of 3/1/23: 1.638 m (5' 4.5\").    Weight as of this encounter: 72.3 kg (159 lb 6.4 oz).       Appropriate preventive services were discussed with this patient, including applicable screening as appropriate for cardiovascular disease, diabetes, osteopenia/osteoporosis, and glaucoma.  As appropriate for age/gender, discussed screening for colorectal cancer, prostate cancer, breast cancer, and cervical cancer. Checklist reviewing preventive services available has been given to the patient.    Reviewed patients plan of care and provided an AVS. The Basic Care Plan (routine screening as documented in Health Maintenance) for Leticia meets the Care Plan requirement. This Care Plan has been established and reviewed with the  Patient.      The following health maintenance items are reviewed in Epic and correct as of today:  Health Maintenance   Topic Date Due    ZOSTER IMMUNIZATION (1 of 2) Never done    BMP  06/14/2023    LIPID  06/14/2023    MICROALBUMIN  06/14/2023    MEDICARE ANNUAL WELLNESS VISIT  06/21/2023    INFLUENZA VACCINE (1) 09/01/2023    COLORECTAL CANCER SCREENING  11/26/2023    A1C  12/13/2023    DIABETIC FOOT EXAM  03/01/2024    PHQ-9  03/13/2024    EYE EXAM  08/25/2024    MAMMO SCREENING  11/04/2024    DTAP/TDAP/TD IMMUNIZATION (3 - Td or Tdap) 04/05/2026    ADVANCE CARE PLANNING  05/18/2026    HEPATITIS C SCREENING  Completed    HIV SCREENING  Completed    DEPRESSION ACTION PLAN  Completed    Pneumococcal Vaccine: Pediatrics (0 to 5 Years) and At-Risk Patients (6 to 64 Years)  Completed    COVID-19 Vaccine  Completed    IPV IMMUNIZATION  Aged Out    HPV IMMUNIZATION  Aged Out    MENINGITIS IMMUNIZATION  Aged Out    PAP  Discontinued       Lc Michael  [facility]  For any issues my office # is [phone]  "

## 2023-09-13 ENCOUNTER — OFFICE VISIT (OUTPATIENT)
Dept: FAMILY MEDICINE | Facility: CLINIC | Age: 63
End: 2023-09-13

## 2023-09-13 VITALS
SYSTOLIC BLOOD PRESSURE: 134 MMHG | DIASTOLIC BLOOD PRESSURE: 88 MMHG | HEART RATE: 80 BPM | WEIGHT: 159.4 LBS | OXYGEN SATURATION: 97 % | BODY MASS INDEX: 26.94 KG/M2

## 2023-09-13 DIAGNOSIS — D50.0 IRON DEFICIENCY ANEMIA DUE TO CHRONIC BLOOD LOSS: ICD-10-CM

## 2023-09-13 DIAGNOSIS — H90.0 CONDUCTIVE HEARING LOSS, BILATERAL: ICD-10-CM

## 2023-09-13 DIAGNOSIS — F33.42 RECURRENT MAJOR DEPRESSIVE DISORDER, IN FULL REMISSION (H): ICD-10-CM

## 2023-09-13 DIAGNOSIS — E11.69 TYPE 2 DIABETES MELLITUS WITH OTHER SPECIFIED COMPLICATION, WITHOUT LONG-TERM CURRENT USE OF INSULIN (H): Primary | ICD-10-CM

## 2023-09-13 PROBLEM — N39.3 STRESS INCONTINENCE OF URINE: Status: RESOLVED | Noted: 2017-03-20 | Resolved: 2023-09-13

## 2023-09-13 PROBLEM — N81.11 CYSTOCELE, MIDLINE: Status: RESOLVED | Noted: 2017-03-20 | Resolved: 2023-09-13

## 2023-09-13 PROBLEM — R70.0 ELEVATED SED RATE: Status: ACTIVE | Noted: 2018-12-05

## 2023-09-13 PROBLEM — R73.03 PRE-DIABETES: Status: RESOLVED | Noted: 2018-12-05 | Resolved: 2023-09-13

## 2023-09-13 LAB
% GRANULOCYTES: 67.2 % (ref 42.2–75.2)
A/C RATIO (RMG): <30
ALBUMIN- RMG: 10 (ref 0–10)
ANION GAP SERPL CALCULATED.3IONS-SCNC: 11 MMOL/L (ref 7–15)
BUN SERPL-MCNC: 12.7 MG/DL (ref 8–23)
CALCIUM SERPL-MCNC: 9.7 MG/DL (ref 8.8–10.2)
CHLORIDE SERPL-SCNC: 102 MMOL/L (ref 98–107)
CHOLESTEROL: 184 MG/DL (ref 100–199)
CREAT SERPL-MCNC: 0.68 MG/DL (ref 0.51–0.95)
DEPRECATED HCO3 PLAS-SCNC: 26 MMOL/L (ref 22–29)
EGFRCR SERPLBLD CKD-EPI 2021: >90 ML/MIN/1.73M2
FASTING?: NO
GLUCOSE SERPL-MCNC: 165 MG/DL (ref 70–99)
HBA1C MFR BLD: 6.2 % (ref 4–6)
HCT VFR BLD AUTO: 44.3 % (ref 35–46)
HDL (RMG): 68 MG/DL (ref 40–?)
HEMOGLOBIN: 14.9 G/DL (ref 11.8–15.5)
INTERPRETATION: NORMAL
LDL CALCULATED (RMG): 78 MG/DL (ref 0–130)
LYMPHOCYTES NFR BLD AUTO: 26.6 % (ref 20.5–51.1)
MCH RBC QN AUTO: 32.3 PG (ref 27–31)
MCHC RBC AUTO-ENTMCNC: 33.7 G/DL (ref 33–37)
MCV RBC AUTO: 95.7 FL (ref 80–100)
MONOCYTES NFR BLD AUTO: 6.2 % (ref 1.7–9.3)
PLATELET # BLD AUTO: 404 K/UL (ref 140–450)
POTASSIUM SERPL-SCNC: 4.5 MMOL/L (ref 3.4–5.3)
RBC # BLD AUTO: 4.63 X10/CMM (ref 3.7–5.2)
SODIUM SERPL-SCNC: 139 MMOL/L (ref 136–145)
TRIGLYCERIDES (RMG): 187 MG/DL (ref 0–149)
URINE CREATININE - RMG: 100 (ref 0–300)
WBC # BLD AUTO: 7.8 X10/CMM (ref 3.8–11)

## 2023-09-13 PROCEDURE — 36415 COLL VENOUS BLD VENIPUNCTURE: CPT | Performed by: FAMILY MEDICINE

## 2023-09-13 PROCEDURE — 80048 BASIC METABOLIC PNL TOTAL CA: CPT | Performed by: FAMILY MEDICINE

## 2023-09-13 PROCEDURE — 99214 OFFICE O/P EST MOD 30 MIN: CPT | Performed by: FAMILY MEDICINE

## 2023-09-13 PROCEDURE — 82044 UR ALBUMIN SEMIQUANTITATIVE: CPT | Performed by: FAMILY MEDICINE

## 2023-09-13 PROCEDURE — 83036 HEMOGLOBIN GLYCOSYLATED A1C: CPT | Performed by: FAMILY MEDICINE

## 2023-09-13 PROCEDURE — 80061 LIPID PANEL: CPT | Mod: QW | Performed by: FAMILY MEDICINE

## 2023-09-13 PROCEDURE — 85025 COMPLETE CBC W/AUTO DIFF WBC: CPT | Performed by: FAMILY MEDICINE

## 2023-09-13 ASSESSMENT — PATIENT HEALTH QUESTIONNAIRE - PHQ9
5. POOR APPETITE OR OVEREATING: NOT AT ALL
SUM OF ALL RESPONSES TO PHQ QUESTIONS 1-9: 6

## 2023-09-13 ASSESSMENT — ANXIETY QUESTIONNAIRES
GAD7 TOTAL SCORE: 0
GAD7 TOTAL SCORE: 0
2. NOT BEING ABLE TO STOP OR CONTROL WORRYING: NOT AT ALL
7. FEELING AFRAID AS IF SOMETHING AWFUL MIGHT HAPPEN: NOT AT ALL
6. BECOMING EASILY ANNOYED OR IRRITABLE: NOT AT ALL
1. FEELING NERVOUS, ANXIOUS, OR ON EDGE: NOT AT ALL
3. WORRYING TOO MUCH ABOUT DIFFERENT THINGS: NOT AT ALL
5. BEING SO RESTLESS THAT IT IS HARD TO SIT STILL: NOT AT ALL

## 2023-09-15 NOTE — RESULT ENCOUNTER NOTE
Dear Leticia,     I am writing to report that your included test results are as expected.    Many labs contain some results that are slightly outside of the normal range, I have reviewed any of these results and they require no changes at this time.    Lc Michael MD

## 2023-10-01 ENCOUNTER — TRANSFERRED RECORDS (OUTPATIENT)
Dept: MULTI SPECIALTY CLINIC | Facility: CLINIC | Age: 63
End: 2023-10-01

## 2023-10-01 LAB — RETINOPATHY: NORMAL

## 2023-10-01 ASSESSMENT — ENCOUNTER SYMPTOMS
PARESTHESIAS: 0
PALPITATIONS: 0
BREAST MASS: 0
WEAKNESS: 0
DIARRHEA: 0
HEADACHES: 0
MYALGIAS: 0
NERVOUS/ANXIOUS: 0
CHILLS: 0
NAUSEA: 0
DIZZINESS: 0
ARTHRALGIAS: 1
SORE THROAT: 0
ABDOMINAL PAIN: 0
SHORTNESS OF BREATH: 0
EYE PAIN: 0
CONSTIPATION: 0
HEMATOCHEZIA: 0
FREQUENCY: 0
HEMATURIA: 0
DYSURIA: 0
FEVER: 0
HEARTBURN: 0
JOINT SWELLING: 0
COUGH: 0

## 2023-10-01 ASSESSMENT — ACTIVITIES OF DAILY LIVING (ADL): CURRENT_FUNCTION: NO ASSISTANCE NEEDED

## 2023-10-04 ENCOUNTER — TRANSFERRED RECORDS (OUTPATIENT)
Dept: FAMILY MEDICINE | Facility: CLINIC | Age: 63
End: 2023-10-04

## 2023-10-04 NOTE — PROGRESS NOTES
"SUBJECTIVE:   Leticia Tompkins is a 63 year old female who presents for Preventive Visit.     Patient has been advised of split billing requirements and indicates understanding: Yes  Are you in the first 12 months of your Medicare Part B coverage?  Yes,  went to [facility] yesterday (10/4) and had full eye exam - everything was really good per pt     Physical Health:  In general, how would you rate your overall physical health? good  Outside of work, how many days during the week do you exercise? 4-5 days/week  Outside of work, approximately how many minutes a day do you exercise?45-60 minutes  If you drink alcohol do you typically have >3 drinks per day or >7 drinks per week? No  Do you usually eat at least 4 servings of fruit and vegetables a day, include whole grains & fiber and avoid regularly eating high fat or \"junk\" foods? Yes  Do you have any problems taking medications regularly?  No  Do you have any side effects from medications? none  Needs assistance for the following daily activities: no assistance needed  Which of the following safety concerns are present in your home?  none identified   Hearing impairment: Yes,  saw Audiologist and has some hearing loss - no action required at this time   In the past 6 months, have you been bothered by leaking of urine? no  Healthy Habits:  Have you had an eye exam in the past two years? Yes   Do you see a dentist twice per year? Yes   What is your current smoking status? None  Do you use smokeless tobacco? No  Abuse: Current or Past(Physical, Sexual or Emotional)- No     HEARING FREQUENCY: Saw Audiologist yesterday (10/4) - has some hearing loss but no action required at this time     Mental Health:  In general, how would you rate your overall mental or emotional health? good  PHQ-2 Score:  0    Do you feel safe in your environment? Yes    Have you ever done Advance Care Planning? (For example, a Health Directive, POLST, or a discussion with a medical provider " "or your loved ones about your wishes): Yes, advance care planning is on file.    Additional concerns to address?  YES    Fall risk:  Fallen 2 or more times in the past year?: No  Any fall with injury in the past year?: No    Cognitive Screenin) Repeat 3 items (Leader, Season, Table)    2) Clock draw: NORMAL  3) 3 item recall: Recalls 3 objects  Results: 3 items recalled: COGNITIVE IMPAIRMENT LESS LIKELY    Mini-CogTM Copyright ELSIE Gibbs. Licensed by the author for use in [facility]; reprinted with permission (anival@[facility]). All rights reserved.      Do you have sleep apnea, excessive snoring or daytime drowsiness? : no    OBJECTIVE:   /72   Pulse 86   Ht 1.619 m (5' 3.75\")   Wt 72.8 kg (160 lb 9.6 oz)   SpO2 97%   BMI 27.78 kg/m   Estimated body mass index is 27.78 kg/m  as calculated from the following:    Height as of this encounter: 1.619 m (5' 3.75\").    Weight as of this encounter: 72.8 kg (160 lb 9.6 oz).      ASSESSMENT / PLAN:     Encounter for Medicare annual wellness exam    Needs flu shot  -     INFLUENZA VACCINE 65+ (FLUAD)              "

## 2023-10-04 NOTE — PATIENT INSTRUCTIONS
Preventive Health Recommendations    See your health care provider every year to  Review health changes.   Discuss preventive care.    Review your medicines if your doctor has prescribed any.    You no longer need a yearly Pap test unless you've had an abnormal Pap test in the past 10 years. If you have vaginal symptoms, such as bleeding or discharge, be sure to talk with your provider about a Pap test.    Every 1 to 2 years, have a mammogram.  If you are over 69, talk with your health care provider about whether or not you want to continue having screening mammograms.    Every 10 years, have a colonoscopy. Or, have a yearly FIT test (stool test). These exams will check for colon cancer.     Have a cholesterol test every 5 years, or more often if your doctor advises it.     Have a diabetes test (fasting glucose) every three years. If you are at risk for diabetes, you should have this test more often.     At age 65, have a bone density scan (DEXA) to check for osteoporosis (brittle bone disease).    Shots:  Get a flu shot each year.  Get a tetanus shot every 10 years.  Talk to your doctor about your pneumonia vaccines. There are now two you should receive - Pneumovax (PPSV 23) and Prevnar (PCV 13).  Talk to your pharmacist about the shingles vaccine.  Talk to your doctor about the hepatitis B vaccine.    Nutrition:   Eat at least 5 servings of fruits and vegetables each day.    Eat whole-grain bread, whole-wheat pasta and brown rice instead of white grains and rice.    Get adequate about Calcium and Vitamin D.     Lifestyle  Exercise at least 150 minutes a week (30 minutes a day, 5 days a week). This will help you control your weight and prevent disease.    Limit alcohol to one drink per day.    No smoking.     Wear sunscreen to prevent skin cancer.     See your dentist twice a year for an exam and cleaning.    See your eye doctor every 1 to 2 years to screen for conditions such as glaucoma, macular degeneration,  cataracts, etc.    Personalized Prevention Plan  You are due for the preventive services outlined below.  Your care team is available to assist you in scheduling these services.  If you have already completed any of these items, please share that information with your care team to update in your medical record.    Health Maintenance Due   Topic Date Due     Osteoporosis Screening  Never done     Zoster (Shingles) Vaccine (1 of 2) Never done     Flu Vaccine (1) 09/01/2023     COVID-19 Vaccine (7 - 2023-24 season) 09/01/2023     FALL RISK ASSESSMENT  Never done     Patient Education   Personalized Prevention Plan  You are due for the preventive services outlined below.  Your care team is available to assist you in scheduling these services.  If you have already completed any of these items, please share that information with your care team to update in your medical record.  Health Maintenance Due   Topic Date Due     Osteoporosis Screening  Never done     Zoster (Shingles) Vaccine (1 of 2) Never done     Flu Vaccine (1) 09/01/2023     COVID-19 Vaccine (7 - 2023-24 season) 09/01/2023     FALL RISK ASSESSMENT  Never done

## 2023-10-05 ENCOUNTER — OFFICE VISIT (OUTPATIENT)
Dept: FAMILY MEDICINE | Facility: CLINIC | Age: 63
End: 2023-10-05

## 2023-10-05 VITALS
SYSTOLIC BLOOD PRESSURE: 129 MMHG | DIASTOLIC BLOOD PRESSURE: 72 MMHG | HEIGHT: 64 IN | WEIGHT: 160.6 LBS | HEART RATE: 86 BPM | OXYGEN SATURATION: 97 % | BODY MASS INDEX: 27.42 KG/M2

## 2023-10-05 DIAGNOSIS — E11.9 TYPE 2 DIABETES MELLITUS WITHOUT COMPLICATION, WITHOUT LONG-TERM CURRENT USE OF INSULIN (H): ICD-10-CM

## 2023-10-05 DIAGNOSIS — N64.59 INVERTED NIPPLE: ICD-10-CM

## 2023-10-05 DIAGNOSIS — L82.0 INFLAMED SEBORRHEIC KERATOSIS: ICD-10-CM

## 2023-10-05 DIAGNOSIS — Z00.00 ENCOUNTER FOR MEDICARE ANNUAL WELLNESS EXAM: Primary | ICD-10-CM

## 2023-10-05 DIAGNOSIS — Z23 NEEDS FLU SHOT: ICD-10-CM

## 2023-10-05 DIAGNOSIS — Z78.0 POST-MENOPAUSAL: ICD-10-CM

## 2023-10-05 DIAGNOSIS — Z86.79 H/O PAROXYSMAL SUPRAVENTRICULAR TACHYCARDIA: ICD-10-CM

## 2023-10-05 PROBLEM — R70.0 ELEVATED SED RATE: Status: RESOLVED | Noted: 2018-12-05 | Resolved: 2023-10-05

## 2023-10-05 PROBLEM — F33.0 MILD EPISODE OF RECURRENT MAJOR DEPRESSIVE DISORDER (H): Status: ACTIVE | Noted: 2021-05-18

## 2023-10-05 PROBLEM — R79.82 ELEVATED C-REACTIVE PROTEIN (CRP): Status: RESOLVED | Noted: 2018-12-05 | Resolved: 2023-10-05

## 2023-10-05 PROBLEM — K57.30 DIVERTICULOSIS OF LARGE INTESTINE WITHOUT HEMORRHAGE: Chronic | Status: RESOLVED | Noted: 2018-08-21 | Resolved: 2023-10-05

## 2023-10-05 PROCEDURE — 99214 OFFICE O/P EST MOD 30 MIN: CPT | Mod: 25 | Performed by: FAMILY MEDICINE

## 2023-10-05 PROCEDURE — G0008 ADMIN INFLUENZA VIRUS VAC: HCPCS | Mod: 59 | Performed by: FAMILY MEDICINE

## 2023-10-05 PROCEDURE — 17110 DESTRUCTION B9 LES UP TO 14: CPT | Mod: 59 | Performed by: FAMILY MEDICINE

## 2023-10-05 PROCEDURE — G0402 INITIAL PREVENTIVE EXAM: HCPCS | Performed by: FAMILY MEDICINE

## 2023-10-05 PROCEDURE — 90694 VACC AIIV4 NO PRSRV 0.5ML IM: CPT | Performed by: FAMILY MEDICINE

## 2023-10-05 NOTE — PROGRESS NOTES
"SUBJECTIVE:    This 63 year old female presents with the following concerns:     1. H/o SVT :  syncope episode Jan 2020 : worked up by cardiology and nuno at that time showed only a few episodes of SVT, no other findings. Does have remote history of an episode of afib as well noted in the chart.     2 MDD: : Zoloft 100 mg daily. Wellbutrin 150 mg.     3. T2DM : strong Fhx. metformin 500 mg am, 1000 mg pm, trulicity 1.5 mg weekly. Taking statin.  No h/o hypertension. Completed DM retinal scan yesterday : no DM retinopathy as per patient report.     Lab Results   Component Value Date    A1C 6.2 09/13/2023    A1C 6.4 03/01/2023    A1C 6.2 10/06/2022    A1C 7.2 06/14/2022    A1C 6.7 10/13/2021     4. Mole on back. Tends to pick at it. Present for a few years.     5. Left nipple inverted for last 3 years. 2 x normal mammograms since onset. Both nipples inverted prior to breastfeeding.     Health Maintenance:  Health maintenance alerts were reviewed and updated as appropriate.  Breast cancer screening: due  Osteoporosis screening:  due first.   Colorectal cancer screening: due cologuard      OBJECTIVE:  Vitals:    10/05/23 0904   BP: 129/72   Pulse: 86   SpO2: 97%   Weight: 72.8 kg (160 lb 9.6 oz)   Height: 1.619 m (5' 3.75\")    Body mass index is 27.78 kg/m .  General: no acute distress, cooperative with exam.  HEENT:  PERRLA. Bilateral TM's, external canals, oropharynx normal.    Neck:  Supple, no lymphadenopathy or thyromegaly   Breasts: breasts appear normal, no suspicious masses, no skin or nipple changes EXCEPT left nipple inverted.   Lungs: clear to auscultation bilaterally, normal respiratory effort.  Heart:  RRR without murmurs, rubs or gallops.  Normal S1 and S2.  Abdomen: normal bowel sounds, nontender, no palpable organomegaly.  Skin: approx 5 mm diameter tan lesion to left mid back with stuck on appearance and overlying fine scale and erythema.   Extremities: warm, perfused, no swelling or edema.  Neuro:  " CN II-XII intact, motor & sensory function all intact.    Psych: mental status normal, mood and affect appropriate.        2/28/2023     2:34 PM 3/1/2023     8:56 AM 9/13/2023    11:01 AM   PHQ   PHQ-9 Total Score 12 11 6   Q9: Thoughts of better off dead/self-harm past 2 weeks Not at all Not at all Not at all       ASSESSMENT / PLAN:        H/O paroxysmal supraventricular tachycardia  Syncope episode Jan 2020 : worked up by cardiology and ziopatch at that time showed only a few episodes of SVT, no other findings. Does have remote history of an episode of afib as well noted in the chart.     Type 2 diabetes mellitus without complication, without long-term current use of insulin (H)  Historically well controlled. metformin 500 mg am, 1000 mg pm, trulicity 1.5 mg weekly. Taking statin.  No h/o hypertension. Completed DM retinal scan yesterday : no DM retinopathy as per patient report.     Post-menopausal  -     DEXA - Hip/Pelvis/Spine (Bone Density); Future    Inverted nipple  -     MAMMO - Diagnostic Digital Bilateral (FUTURE/[facility]); Future

## 2023-10-05 NOTE — PROGRESS NOTES
SK cryotherapy procedure     Location: left mid back. Causing local irritation and catching.    The procedure, risks and complications were discussed. Verbal consent was obtained for the procedure.     PROCEDURE:     Cryotherapy with liquid nitrogen, was applied for 3 freeze thaw cycles. Frost ring obtained at each cycle.       Inflamed seborrheic keratosis  -     DESTRUCT BENIGN LESION, UP TO 14    Follow up: The patient tolerated the procedure well without complications.  Standard post-procedure care was explained.

## 2023-10-12 ENCOUNTER — HOSPITAL ENCOUNTER (OUTPATIENT)
Dept: MAMMOGRAPHY | Facility: CLINIC | Age: 63
Discharge: HOME | End: 2023-10-12
Attending: FAMILY MEDICINE
Payer: COMMERCIAL

## 2023-10-12 ENCOUNTER — HOSPITAL ENCOUNTER (OUTPATIENT)
Dept: BONE DENSITY | Facility: CLINIC | Age: 63
Discharge: HOME | End: 2023-10-12
Attending: FAMILY MEDICINE
Payer: COMMERCIAL

## 2023-10-12 DIAGNOSIS — N64.59 INVERTED NIPPLE: ICD-10-CM

## 2023-10-12 DIAGNOSIS — Z78.0 POST-MENOPAUSAL: ICD-10-CM

## 2023-10-12 PROCEDURE — 76642 ULTRASOUND BREAST LIMITED: CPT | Mod: LT

## 2023-10-12 PROCEDURE — 77080 DXA BONE DENSITY AXIAL: CPT

## 2023-10-12 PROCEDURE — 77066 DX MAMMO INCL CAD BI: CPT

## 2023-11-06 DIAGNOSIS — F39 MOOD DISORDER (H): ICD-10-CM

## 2023-11-06 RX ORDER — SERTRALINE HYDROCHLORIDE 100 MG/1
100 TABLET, FILM COATED ORAL DAILY
Qty: 90 TABLET | Refills: 3 | Status: SHIPPED | OUTPATIENT
Start: 2023-11-06 | End: 2024-07-17

## 2023-11-06 NOTE — CONFIDENTIAL NOTE
Med: SERTRALINE    LOV (related): 10/5/23 - CPX      Due for F/U around: 3/2024 FOR DM CHECK    Next Appt: NONE            2/28/2023     2:34 PM 3/1/2023     8:56 AM 9/13/2023    11:01 AM   PHQ   PHQ-9 Total Score 12 11 6   Q9: Thoughts of better off dead/self-harm past 2 weeks Not at all Not at all Not at all           6/21/2022     4:49 PM 3/1/2023     8:56 AM 9/13/2023    11:01 AM   SUDHIR-7 SCORE   Total Score 0 1 0

## 2023-12-24 ENCOUNTER — MYC REFILL (OUTPATIENT)
Dept: PHARMACY | Facility: PHYSICIAN GROUP | Age: 63
End: 2023-12-24
Payer: COMMERCIAL

## 2023-12-24 ENCOUNTER — HEALTH MAINTENANCE LETTER (OUTPATIENT)
Age: 63
End: 2023-12-24

## 2023-12-24 DIAGNOSIS — E11.69 TYPE 2 DIABETES MELLITUS WITH OTHER SPECIFIED COMPLICATION, WITHOUT LONG-TERM CURRENT USE OF INSULIN (H): ICD-10-CM

## 2023-12-26 NOTE — TELEPHONE ENCOUNTER
Patient sent message requesting refill for Trulicity 1.5mg subcutaneous weekly.   Last related visit: 10/5/23 with Dr. Goddard for annual wellness visit/med check    Lab Results   Component Value Date    A1C 6.2 09/13/2023    A1C 6.4 03/01/2023    A1C 6.2 10/06/2022    A1C 7.2 06/14/2022    A1C 6.7 10/13/2021      Plan: routed request to Dr. Goddard for review.  aSndra Thomas RN

## 2024-01-12 DIAGNOSIS — E78.5 HYPERLIPIDEMIA LDL GOAL <100: ICD-10-CM

## 2024-01-12 DIAGNOSIS — E11.69 TYPE 2 DIABETES MELLITUS WITH OTHER SPECIFIED COMPLICATION, WITHOUT LONG-TERM CURRENT USE OF INSULIN (H): ICD-10-CM

## 2024-01-12 NOTE — TELEPHONE ENCOUNTER
Med: Rosuvastatin     LOV (related): 10/5/23    Last Lab: 9/13/23      Due for F/U around: 1 year      Next Appt: None           Cholesterol   Date Value Ref Range Status   09/13/2023 184 100 - 199 mg/dL Final   06/14/2022 195 100 - 199 mg/dl Final   05/10/2021 236 (H) 100 - 199 mg/dL Final   08/10/2020 248 (H) 100 - 199 mg/dL Final     HDL Cholesterol   Date Value Ref Range Status   05/10/2021 84 >39 mg/dL Final   08/10/2020 76 >39 mg/dL Final     HDL   Date Value Ref Range Status   09/13/2023 68 40 mg/dL Final   06/14/2022 76 40 mg/dl Final     LDL Cholesterol Calculated   Date Value Ref Range Status   05/10/2021 142 (H) 0 - 99 mg/dL Final   08/10/2020 159 (H) 0 - 99 mg/dL Final     LDL CALCULATED ([facility])   Date Value Ref Range Status   09/13/2023 78 0 - 130 mg/dL Final   06/14/2022 109 0 - 130 mg/dl Final     Triglycerides   Date Value Ref Range Status   09/13/2023 187 (A) 0 - 149 mg/dL Final   06/14/2022 46 0 - 149 mg/dl Final   05/10/2021 58 0 - 149 mg/dL Final   08/10/2020 67 0 - 149 mg/dL Final     Cholesterol/HDL Ratio   Date Value Ref Range Status   05/05/2009 3.5 0.0 - 5.0 Final   03/12/2007 2.6 0.0 - 5.0 Final

## 2024-01-14 RX ORDER — ROSUVASTATIN CALCIUM 10 MG/1
10 TABLET, COATED ORAL DAILY
Qty: 90 TABLET | Refills: 1 | Status: SHIPPED | OUTPATIENT
Start: 2024-01-14 | End: 2024-07-01

## 2024-03-14 ENCOUNTER — OFFICE VISIT (OUTPATIENT)
Dept: FAMILY MEDICINE | Facility: CLINIC | Age: 64
End: 2024-03-14

## 2024-03-14 VITALS
HEART RATE: 81 BPM | OXYGEN SATURATION: 97 % | SYSTOLIC BLOOD PRESSURE: 134 MMHG | DIASTOLIC BLOOD PRESSURE: 80 MMHG | WEIGHT: 171 LBS | BODY MASS INDEX: 29.58 KG/M2

## 2024-03-14 DIAGNOSIS — F41.1 GAD (GENERALIZED ANXIETY DISORDER): ICD-10-CM

## 2024-03-14 DIAGNOSIS — Z23 NEED FOR IMMUNIZATION AGAINST TYPHOID: ICD-10-CM

## 2024-03-14 DIAGNOSIS — E11.65 TYPE 2 DIABETES MELLITUS WITH HYPERGLYCEMIA, WITHOUT LONG-TERM CURRENT USE OF INSULIN (H): Primary | ICD-10-CM

## 2024-03-14 DIAGNOSIS — F33.1 MODERATE EPISODE OF RECURRENT MAJOR DEPRESSIVE DISORDER (H): ICD-10-CM

## 2024-03-14 PROBLEM — F32.1 CURRENT MODERATE EPISODE OF MAJOR DEPRESSIVE DISORDER WITHOUT PRIOR EPISODE (H): Status: ACTIVE | Noted: 2024-03-14

## 2024-03-14 PROBLEM — Z86.79 H/O PAROXYSMAL SUPRAVENTRICULAR TACHYCARDIA: Status: RESOLVED | Noted: 2023-10-05 | Resolved: 2024-03-14

## 2024-03-14 PROBLEM — I47.10 SVT (SUPRAVENTRICULAR TACHYCARDIA) (H): Status: ACTIVE | Noted: 2024-03-14

## 2024-03-14 PROBLEM — E11.69 TYPE 2 DIABETES MELLITUS WITH OTHER SPECIFIED COMPLICATION, WITHOUT LONG-TERM CURRENT USE OF INSULIN (H): Status: ACTIVE | Noted: 2020-01-10

## 2024-03-14 PROBLEM — I47.10 SVT (SUPRAVENTRICULAR TACHYCARDIA) (H): Status: RESOLVED | Noted: 2024-03-14 | Resolved: 2024-03-14

## 2024-03-14 PROBLEM — F33.0 MILD EPISODE OF RECURRENT MAJOR DEPRESSIVE DISORDER (H): Status: RESOLVED | Noted: 2021-05-18 | Resolved: 2024-03-14

## 2024-03-14 PROBLEM — E11.9 DIABETES MELLITUS, TYPE 2 (H): Status: ACTIVE | Noted: 2024-03-14

## 2024-03-14 PROBLEM — E11.69 TYPE 2 DIABETES MELLITUS WITH OTHER SPECIFIED COMPLICATION, WITHOUT LONG-TERM CURRENT USE OF INSULIN (H): Status: RESOLVED | Noted: 2020-01-10 | Resolved: 2024-03-14

## 2024-03-14 LAB — HBA1C MFR BLD: 8 % (ref 4–6)

## 2024-03-14 PROCEDURE — 90691 TYPHOID VACCINE IM: CPT | Performed by: FAMILY MEDICINE

## 2024-03-14 PROCEDURE — 99214 OFFICE O/P EST MOD 30 MIN: CPT | Mod: 25 | Performed by: FAMILY MEDICINE

## 2024-03-14 PROCEDURE — 83036 HEMOGLOBIN GLYCOSYLATED A1C: CPT | Performed by: FAMILY MEDICINE

## 2024-03-14 PROCEDURE — 90471 IMMUNIZATION ADMIN: CPT | Performed by: FAMILY MEDICINE

## 2024-03-14 PROCEDURE — 36415 COLL VENOUS BLD VENIPUNCTURE: CPT | Performed by: FAMILY MEDICINE

## 2024-03-14 RX ORDER — BUPROPION HYDROCHLORIDE 300 MG/1
300 TABLET ORAL EVERY MORNING
Qty: 90 TABLET | Refills: 3 | Status: SHIPPED | OUTPATIENT
Start: 2024-03-14 | End: 2024-07-17

## 2024-03-14 NOTE — PROGRESS NOTES
SUBJECTIVE:    Leticia Tompkins, is a 63 year old female presenting for the below:     1. T2DM :  Glycemic control medications: metformin 500 mg am, 1000 pm. has been off of Trulicity 1.5 mg for 2 months. Has been back on 0.75 mg for 2 weeks while awaiting next refill of 1.5 mg (due to national shortage).  Home BG monitoring: none  Blood pressure: does not have hypertension.   Cholesterol:  is taking statin.   Tobacco abuse: present, absent    Hemoglobin A1C   Date Value Ref Range Status   03/14/2024 8.0 (A) 4.0 - 6.0 % Final   09/13/2023 6.2 (A) 4.0 - 6.0 % Final   03/01/2023 6.4 (A) 4.0 - 6.0 % Final       2. Vaccines for travel to [place] and [place] on 03/30/2024 : travelling for 2 weeks. [place]. Has declines rabies vaccines in past. Would like to proceed with typhoid vaccine.       3. SUDHIR / MDD : Zoloft 100 mg and wellbutrin 150 mg. Feeling 'flat' and endorses ongoing low mood. No self harm or suicidal ideation.       OBJECTIVE:  Vitals:    03/14/24 1249   BP: 134/80   Pulse: 81   SpO2: 97%   Weight: 77.6 kg (171 lb)    Body mass index is 29.58 kg/m .  General: no acute distress, cooperative with exam.  Appearance:  awake, alert and adequately groomed  Attitude:  cooperative  Eye Contact:  good  Mood:  congruent throughout  Affect: flat  Speech:  clear, coherent  Thought Process:  logical      ASSESSMENT / PLAN:      Type 2 diabetes mellitus with hyperglycemia, without long-term current use of insulin (H)  A1c >7%. Not at goal  Has been out of Trulicity then lower dose due to national shortage.   -metformin 500 mg am, 1000 pm.   -increase Trulicity 0.75 to 1.5 mg   -follow up T2DM A1c check 3 months.   -     Hemoglobin A1C ([facility])  -     VENOUS COLLECTION  -     dulaglutide (TRULICITY) 1.5 MG/0.5ML pen; Inject 1.5 mg Subcutaneous every 7 days    Moderate episode of recurrent major depressive disorder (H)  SUDHIR (generalized anxiety disorder)  Worsening of MDD   -continue Zoloft 100 mg daily  -increase  Wellbutrin 150 mg --> 300 mg.  -follow up 3 months (at time of T2DM check)  -     buPROPion (WELLBUTRIN XL) 300 MG 24 hr tablet; Take 1 tablet (300 mg) by mouth every morning     Need for immunization against typhoid  Vaccines for travel to [place] and [place] on 03/30/2024 : travelling for 2 weeks. Rural and Urban. Has declines rabies vaccines in past. Would like to proceed with typhoid vaccine. Leaving in 2 weeks.   0.5 mL IM given at least 2 weeks prior to expected exposure  -     TYPHOID VACCINE, IM  -     VACCINE ADMINISTRATION, INITIAL

## 2024-03-22 DIAGNOSIS — F39 MOOD DISORDER (H): ICD-10-CM

## 2024-03-22 RX ORDER — BUPROPION HYDROCHLORIDE 150 MG/1
150 TABLET ORAL EVERY MORNING
Qty: 90 TABLET | Refills: 1 | Status: SHIPPED | OUTPATIENT
Start: 2024-03-22 | End: 2024-07-17

## 2024-03-22 NOTE — TELEPHONE ENCOUNTER
Med: Bupropion     LOV (related): 3/14/24      Due for F/U around: 3 months for DM visit     Next Appt: None               2/28/2023     2:34 PM 3/1/2023     8:56 AM 9/13/2023    11:01 AM   PHQ   PHQ-9 Total Score 12 11 6   Q9: Thoughts of better off dead/self-harm past 2 weeks Not at all Not at all Not at all           6/21/2022     4:49 PM 3/1/2023     8:56 AM 9/13/2023    11:01 AM   SUDHIR-7 SCORE   Total Score 0 1 0

## 2024-03-27 ENCOUNTER — TRANSFERRED RECORDS (OUTPATIENT)
Dept: FAMILY MEDICINE | Facility: CLINIC | Age: 64
End: 2024-03-27

## 2024-03-28 ENCOUNTER — TRANSFERRED RECORDS (OUTPATIENT)
Dept: FAMILY MEDICINE | Facility: CLINIC | Age: 64
End: 2024-03-28

## 2024-03-29 ENCOUNTER — OFFICE VISIT (OUTPATIENT)
Dept: FAMILY MEDICINE | Facility: CLINIC | Age: 64
End: 2024-03-29

## 2024-03-29 VITALS
TEMPERATURE: 97.8 F | BODY MASS INDEX: 27.96 KG/M2 | HEART RATE: 82 BPM | SYSTOLIC BLOOD PRESSURE: 131 MMHG | DIASTOLIC BLOOD PRESSURE: 86 MMHG | WEIGHT: 167.8 LBS | OXYGEN SATURATION: 95 % | HEIGHT: 65 IN

## 2024-03-29 DIAGNOSIS — E78.5 HYPERLIPIDEMIA LDL GOAL <100: ICD-10-CM

## 2024-03-29 DIAGNOSIS — E11.65 TYPE 2 DIABETES MELLITUS WITH HYPERGLYCEMIA, WITHOUT LONG-TERM CURRENT USE OF INSULIN (H): ICD-10-CM

## 2024-03-29 DIAGNOSIS — F32.1 CURRENT MODERATE EPISODE OF MAJOR DEPRESSIVE DISORDER WITHOUT PRIOR EPISODE (H): ICD-10-CM

## 2024-03-29 DIAGNOSIS — Z01.818 PREOP GENERAL PHYSICAL EXAM: Primary | ICD-10-CM

## 2024-03-29 DIAGNOSIS — Z86.79 H/O PAROXYSMAL SUPRAVENTRICULAR TACHYCARDIA: ICD-10-CM

## 2024-03-29 DIAGNOSIS — S62.101P CLOSED FRACTURE OF RIGHT WRIST WITH MALUNION, SUBSEQUENT ENCOUNTER: ICD-10-CM

## 2024-03-29 LAB
% GRANULOCYTES: 63.8 % (ref 42.2–75.2)
ANION GAP SERPL CALCULATED.3IONS-SCNC: 11 MMOL/L (ref 7–15)
BUN SERPL-MCNC: 12.9 MG/DL (ref 8–23)
CALCIUM SERPL-MCNC: 9.4 MG/DL (ref 8.8–10.2)
CHLORIDE SERPL-SCNC: 104 MMOL/L (ref 98–107)
CREAT SERPL-MCNC: 0.65 MG/DL (ref 0.51–0.95)
DEPRECATED HCO3 PLAS-SCNC: 24 MMOL/L (ref 22–29)
EGFRCR SERPLBLD CKD-EPI 2021: >90 ML/MIN/1.73M2
GLUCOSE SERPL-MCNC: 209 MG/DL (ref 70–99)
HCT VFR BLD AUTO: 39.8 % (ref 35–46)
HEMOGLOBIN: 13.5 G/DL (ref 11.8–15.5)
LYMPHOCYTES NFR BLD AUTO: 28.8 % (ref 20.5–51.1)
MCH RBC QN AUTO: 31.1 PG (ref 27–31)
MCHC RBC AUTO-ENTMCNC: 33.9 G/DL (ref 33–37)
MCV RBC AUTO: 91.6 FL (ref 80–100)
MONOCYTES NFR BLD AUTO: 7.4 % (ref 1.7–9.3)
PLATELET # BLD AUTO: 392 K/UL (ref 140–450)
POTASSIUM SERPL-SCNC: 4.4 MMOL/L (ref 3.4–5.3)
RBC # BLD AUTO: 4.34 X10/CMM (ref 3.7–5.2)
SODIUM SERPL-SCNC: 139 MMOL/L (ref 135–145)
WBC # BLD AUTO: 6.4 X10/CMM (ref 3.8–11)

## 2024-03-29 PROCEDURE — 36415 COLL VENOUS BLD VENIPUNCTURE: CPT

## 2024-03-29 PROCEDURE — 85025 COMPLETE CBC W/AUTO DIFF WBC: CPT

## 2024-03-29 PROCEDURE — 99214 OFFICE O/P EST MOD 30 MIN: CPT

## 2024-03-29 PROCEDURE — 80048 BASIC METABOLIC PNL TOTAL CA: CPT

## 2024-03-29 PROCEDURE — 80048 BASIC METABOLIC PNL TOTAL CA: CPT | Mod: 90

## 2024-03-29 PROCEDURE — G2211 COMPLEX E/M VISIT ADD ON: HCPCS

## 2024-03-29 NOTE — PROGRESS NOTES
3/28/24 faxed this office note to [facility] @ [phone]    Corbin Green,   [facility]  [phone]

## 2024-03-29 NOTE — PROGRESS NOTES
Preoperative Evaluation  [facility]  [address] [place] [address]  Phone: [phone]  Fax: [phone]  Primary Provider: Lc Michael  Pre-op Performing Provider: BAILEY SIN  Mar 29, 2024       Leticia is a 63 year old, presenting for the following:  Pre-Op Exam (Surgery/Procedure: right wrist/Surgery Location: [facility] /Surgeon: Levi Keene /Surgery Date: 4/15/24 )      Surgical Information  Surgery/Procedure: right wrist  Surgery Location: [facility]   Surgeon: Levi Keene   Surgery Date: 4/15/24   Time of Surgery: TBD  Where patient plans to recover: At home with family  Fax number for surgical facility: [phone]     Assessment & Plan     The proposed surgical procedure is considered LOW risk.    Preop general physical exam  No apparent contraindications noted for purposed procedure.    - CBC with Diff/Plt ([facility])  - Basic metabolic panel  - VENOUS COLLECTION  - Basic metabolic panel    Closed fracture of right wrist with malunion, subsequent encounter  Indication for surgery    Type 2 diabetes mellitus with hyperglycemia, without long-term current use of insulin (H)  Taking Metformin and Trulicity. Not currently taking Trulicity since it has been unavailable. Last A1c on 3/14/24 and was 8. Continue current medication regimen.   - CBC with Diff/Plt ([facility])  - Basic metabolic panel  - VENOUS COLLECTION  - Basic metabolic panel    Current moderate episode of major depressive disorder without prior episode (H)  Taking Wellbutrin 300 mg and Sertraline 100 mg daily. Stable/controlled. Continue current medication regimen.     Hyperlipidemia LDL goal <100  Taking Rosuvastatin 10 mg daily. Stable/controlled. Continue current medication regimen.     H/O paroxysmal supraventricular tachycardia  Syncope episode Jan 2020 : worked up by cardiology and ziopatch at that time showed only a few episodes of SVT, no other findings.             - No  identified additional risk factors other than previously addressed    Antiplatelet or Anticoagulation Medication Instructions   - Patient is on no antiplatelet or anticoagulation medications.    Additional Medication Instructions  Patient is to take all scheduled medications on the day of surgery EXCEPT for modifications listed below:   - Statins: Continue taking on the day of surgery.    - metformin: HOLD day of surgery.   - GLP-1 Injectable (exenitide, liraglutide, semaglutide, dulaglutide, etc.): HOLD 7 days before surgery    - SSRIs, SNRIs, TCAs, Antipsychotics: Continue without modification.    - Herbal medications and vitamins: HOLD 14 days prior to surgery.    Recommendation  No contraindications noted to proceed with proposed procedure, without further diagnostic evaluation.        Subjective       HPI related to upcoming procedure: right wrist surgery    T2DM: Metformin and Trulicity. Not taking Trulicity since she can't get it. Last A1C 3/14 and was 8.     MDD: Wellbutrin and Sertraline. Going alright.     HLD: Rosuvastatin preventative. No side effects.     H/O SVT: Jan 2020. Situational. Mother passed away. No chest pain, palpitations or shortness of breath.     Right wrist. Out walking and fell on her right wrist. Fracture of radius.         3/28/2024     4:25 PM   Preop Questions   1. Have you ever had a heart attack or stroke? No   2. Have you ever had surgery on your heart or blood vessels, such as a stent placement, a coronary artery bypass, or surgery on an artery in your head, neck, heart, or legs? No   3. Do you have chest pain with activity? No   4. Do you have a history of  heart failure? No   5. Do you currently have a cold, bronchitis or symptoms of other infection? No   6. Do you have a cough, shortness of breath, or wheezing? No   7. Do you or anyone in your family have previous history of blood clots? No   8. Do you or does anyone in your family have a serious bleeding problem such as  prolonged bleeding following surgeries or cuts? No   9. Have you ever had problems with anemia or been told to take iron pills? YES - took iron pills for three months, last iron may -July 2023   10. Have you had any abnormal blood loss such as black, tarry or bloody stools, or abnormal vaginal bleeding? No   11. Have you ever had a blood transfusion? No   12. Are you willing to have a blood transfusion if it is medically needed before, during, or after your surgery? Yes   13. Have you or any of your relatives ever had problems with anesthesia? No   14. Do you have sleep apnea, excessive snoring or daytime drowsiness? No   15. Do you have any artifical heart valves or other implanted medical devices like a pacemaker, defibrillator, or continuous glucose monitor? No   16. Do you have artificial joints? No   17. Are you allergic to latex? No       Health Care Directive  Patient does not have a Health Care Directive or Living Will: Patient states has Advance Directive and will bring in a copy to clinic.    Preoperative Review of    reviewed - no record of controlled substances prescribed.      Status of Chronic Conditions:  See problem list for active medical problems.  Problems all longstanding and stable, except as noted/documented.  See ROS for pertinent symptoms related to these conditions.    Patient Active Problem List    Diagnosis Date Noted    Current moderate episode of major depressive disorder without prior episode (H) 03/14/2024     Priority: Medium    Diabetes mellitus, type 2 (H) 03/14/2024     Priority: Medium      Past Medical History:   Diagnosis Date    Depressive disorder, not elsewhere classified 1997    treated with Zoloft for 8 months    Lumbago     L5-S1 radiculopathy    Spider veins     Tachycardia, unspecified 2004    Single episode treated with Adenosine     Past Surgical History:   Procedure Laterality Date    BLADDER SURGERY      CYSTOCELE REPAIR N/A 0/2013    HYSTERECTOMY N/A      Supracervical. [place] [facility] [place] [facility]     HYSTERECTOMY, PAP NO LONGER INDICATED Bilateral 2014    ORTHOPEDIC SURGERY Left 2016    left foot bunionectomy    ORTHOPEDIC SURGERY      Foot surgery     RECTOCELE REPAIR N/A 2013    [facility] NONSPECIFIC PROCEDURE  1991    Tubal ligation    [facility] NONSPECIFIC PROCEDURE      Lasik eye surgery    [facility] NONSPECIFIC PROCEDURE  2007     L5-S1 discectomy     Current Outpatient Medications   Medication Sig Dispense Refill    buPROPion (WELLBUTRIN XL) 300 MG 24 hr tablet Take 1 tablet (300 mg) by mouth every morning 90 tablet 3    dulaglutide (TRULICITY) 1.5 MG/0.5ML pen Inject 1.5 mg Subcutaneous every 7 days 6 mL 3    magnesium 250 MG tablet Take by mouth daily       metFORMIN (GLUCOPHAGE XR) 500 MG 24 hr tablet TAKE 1 TABLET BY MOUTH EVERY MORNING AND 2 TABLETS BY MOUTH EVERY NIGHT AT BEDTIME WITH MEALS 270 tablet 3    multivitamin w/minerals (THERA-VIT-M) tablet       Omega-3 Fatty Acids (FISH OIL) 1200 MG capsule       rosuvastatin (CRESTOR) 10 MG tablet TAKE 1 TABLET(10 MG) BY MOUTH DAILY 90 tablet 1    sertraline (ZOLOFT) 100 MG tablet Take 1 tablet (100 mg) by mouth daily 90 tablet 3    vitamin D3 (CHOLECALCIFEROL) 2000 units (50 mcg) tablet Take 1 tablet by mouth daily      buPROPion (WELLBUTRIN XL) 150 MG 24 hr tablet TAKE 1 TABLET(150 MG) BY MOUTH EVERY MORNING (Patient not taking: Reported on 3/29/2024) 90 tablet 1       No Known Allergies     Social History     Tobacco Use    Smoking status: Former     Types: Cigarettes     Quit date: 3/12/1983     Years since quittin.0    Smokeless tobacco: Never   Substance Use Topics    Alcohol use: Yes     Alcohol/week: 0.0 - 2.5 standard drinks of alcohol     Comment: socially     Family History   Problem Relation Age of Onset    Diabetes Mother     Cardiovascular Mother         a fib    Lipids Mother     Cancer Mother     Melanoma Father 77    Cancer Father     Diabetes Brother      "Neuromuscular Disease Brother         severe progressive MS - dx'd      History   Drug Use No         Review of Systems    Review of Systems  Constitutional, HEENT, cardiovascular, pulmonary, GI, , musculoskeletal, neuro, skin, endocrine and psych systems are negative, except as otherwise noted.    Objective    /86   Pulse 82   Temp 97.8  F (36.6  C) (Oral)   Ht 1.638 m (5' 4.5\")   Wt 76.1 kg (167 lb 12.8 oz)   SpO2 95%   BMI 28.36 kg/m     Estimated body mass index is 28.36 kg/m  as calculated from the following:    Height as of this encounter: 1.638 m (5' 4.5\").    Weight as of this encounter: 76.1 kg (167 lb 12.8 oz).  Physical Exam  GENERAL: alert and no distress  EYES: Eyes grossly normal to inspection, PERRL and conjunctivae and sclerae normal  HENT: ear canals and TM's normal, nose and mouth without ulcers or lesions  NECK: no adenopathy, no asymmetry, masses, or scars  RESP: lungs clear to auscultation - no rales, rhonchi or wheezes  CV: regular rate and rhythm, normal S1 S2, no S3 or S4, no murmur, click or rub, no peripheral edema  ABDOMEN: soft, nontender, no hepatosplenomegaly, no masses and bowel sounds normal  MS: RUE exam shows right wrist splint in place. Mild swelling to right hand.   SKIN: no suspicious lesions or rashes  NEURO: Normal strength and tone, mentation intact and speech normal  PSYCH: mentation appears normal, affect normal/bright    Recent Labs   Lab Test 03/14/24  1255 09/13/23  1122 09/13/23  0000 03/01/23  0000 10/06/22  1617 06/14/22  1101   HGB  --   --  14.9 10.9*  --   --    PLT  --   --  404 527*  --   --    NA  --  139  --   --   --  140   POTASSIUM  --  4.5  --   --   --  4.7   CR  --  0.68  --   --   --  0.75   A1C 8.0*  --  6.2* 6.4*   < >  --     < > = values in this interval not displayed.        Diagnostics  Labs pending at this time.  Results will be reviewed when available.   No EKG required for low risk surgery (cataract, skin procedure, breast biopsy, " etc).    Revised Cardiac Risk Index (RCRI)  The patient has the following serious cardiovascular risks for perioperative complications:   - No serious cardiac risks = 0 points     RCRI Interpretation: 0 points: Class I (very low risk - 0.4% complication rate)         Signed Electronically by: DONALD Oneill CNP  Copy of this evaluation report is provided to requesting physician.

## 2024-03-29 NOTE — PATIENT INSTRUCTIONS
Preparing for Your Surgery  Getting started  A nurse will call you to review your health history and instructions. They will give you an arrival time based on your scheduled surgery time. Please be ready to share:  Your doctor's clinic name and phone number  Your medical, surgical, and anesthesia history  A list of allergies and sensitivities  A list of medicines, including herbal treatments and over-the-counter drugs  Whether the patient has a legal guardian (ask how to send us the papers in advance)  Please tell us if you're pregnant--or if there's any chance you might be pregnant. Some surgeries may injure a fetus (unborn baby), so they require a pregnancy test. Surgeries that are safe for a fetus don't always need a test, and you can choose whether to have one.   If you have a child who's having surgery, please ask for a copy of Preparing for Your Child's Surgery.    Preparing for surgery  Within 10 to 30 days of surgery: Have a pre-op exam (sometimes called an H&P, or History and Physical). This can be done at a clinic or pre-operative center.  If you're having a , you may not need this exam. Talk to your care team.  At your pre-op exam, talk to your care team about all medicines you take. If you need to stop any medicines before surgery, ask when to start taking them again.  We do this for your safety. Many medicines can make you bleed too much during surgery. Some change how well surgery (anesthesia) drugs work.  Call your insurance company to let them know you're having surgery. (If you don't have insurance, call [phone].)  Call your clinic if there's any change in your health. This includes signs of a cold or flu (sore throat, runny nose, cough, rash, fever). It also includes a scrape or scratch near the surgery site.  If you have questions on the day of surgery, call your hospital or surgery center.  Eating and drinking guidelines  For your safety: Unless your surgeon tells you otherwise,  follow the guidelines below.  Eat and drink as usual until 8 hours before you arrive for surgery. After that, no food or milk.  Drink clear liquids until 2 hours before you arrive. These are liquids you can see through, like water, Gatorade, and Propel Water. They also include plain black coffee and tea (no cream or milk), candy, and breath mints. You can spit out gum when you arrive.  If you drink alcohol: Stop drinking it the night before surgery.  If your care team tells you to take medicine on the morning of surgery, it's okay to take it with a sip of water.  Preventing infection  Shower or bathe the night before and morning of your surgery. Follow the instructions your clinic gave you. (If no instructions, use regular soap.)  Don't shave or clip hair near your surgery site. We'll remove the hair if needed.  Don't smoke or vape the morning of surgery. You may chew nicotine gum up to 2 hours before surgery. A nicotine patch is okay.  Note: Some surgeries require you to completely quit smoking and nicotine. Check with your surgeon.  Your care team will make every effort to keep you safe from infection. We will:  Clean our hands often with soap and water (or an alcohol-based hand rub).  Clean the skin at your surgery site with a special soap that kills germs.  Give you a special gown to keep you warm. (Cold raises the risk of infection.)  Wear special hair covers, masks, gowns and gloves during surgery.  Give antibiotic medicine, if prescribed. Not all surgeries need antibiotics.  What to bring on the day of surgery  Photo ID and insurance card  Copy of your health care directive, if you have one  Glasses and hearing aids (bring cases)  You can't wear contacts during surgery  Inhaler and eye drops, if you use them (tell us about these when you arrive)  CPAP machine or breathing device, if you use them  A few personal items, if spending the night  If you have . . .  A pacemaker, ICD (cardiac defibrillator) or other  implant: Bring the ID card.  An implanted stimulator: Bring the remote control.  A legal guardian: Bring a copy of the certified (court-stamped) guardianship papers.  Please remove any jewelry, including body piercings. Leave jewelry and other valuables at home.  If you're going home the day of surgery  You must have a responsible adult drive you home. They should stay with you overnight as well.  If you don't have someone to stay with you, and you aren't safe to go home alone, we may keep you overnight. Insurance often won't pay for this.  After surgery  If it's hard to control your pain or you need more pain medicine, please call your surgeon's office.  Questions?   If you have any questions for your care team, list them here: _________________________________________________________________________________________________________________________________________________________________________ ____________________________________ ____________________________________ ____________________________________  For informational purposes only. Not to replace the advice of your health care provider. Copyright   2003, 2019 [employer]. All rights reserved. Clinically reviewed by Alfreda Castro MD. [employer] 372301 - REV 12/22.    How to Take Your Medication Before Surgery  - HOLD (do not take) your METFORMIN on the morning of surgery.  - HOLD (do not take) Trulicity for 7 days prior to surgery

## 2024-04-01 NOTE — PROGRESS NOTES
3/29/24 faxed preop and labs to [facility] surgery @ [phone]    Corbin Green,   [facility]  [phone]

## 2024-04-30 ENCOUNTER — TRANSFERRED RECORDS (OUTPATIENT)
Dept: FAMILY MEDICINE | Facility: CLINIC | Age: 64
End: 2024-04-30

## 2024-05-08 ENCOUNTER — MYC REFILL (OUTPATIENT)
Dept: PHARMACY | Facility: PHYSICIAN GROUP | Age: 64
End: 2024-05-08
Payer: COMMERCIAL

## 2024-05-08 DIAGNOSIS — E11.69 TYPE 2 DIABETES MELLITUS WITH OTHER SPECIFIED COMPLICATION, WITHOUT LONG-TERM CURRENT USE OF INSULIN (H): ICD-10-CM

## 2024-05-23 DIAGNOSIS — E11.69 TYPE 2 DIABETES MELLITUS WITH OTHER SPECIFIED COMPLICATION, WITHOUT LONG-TERM CURRENT USE OF INSULIN (H): ICD-10-CM

## 2024-05-23 RX ORDER — METFORMIN HCL 500 MG
TABLET, EXTENDED RELEASE 24 HR ORAL
Qty: 270 TABLET | Refills: 3 | Status: SHIPPED | OUTPATIENT
Start: 2024-05-23

## 2024-05-28 ENCOUNTER — TRANSFERRED RECORDS (OUTPATIENT)
Dept: FAMILY MEDICINE | Facility: CLINIC | Age: 64
End: 2024-05-28

## 2024-07-01 ENCOUNTER — MYC REFILL (OUTPATIENT)
Dept: FAMILY MEDICINE | Facility: CLINIC | Age: 64
End: 2024-07-01

## 2024-07-01 DIAGNOSIS — E11.69 TYPE 2 DIABETES MELLITUS WITH OTHER SPECIFIED COMPLICATION, WITHOUT LONG-TERM CURRENT USE OF INSULIN (H): ICD-10-CM

## 2024-07-01 DIAGNOSIS — E78.5 HYPERLIPIDEMIA LDL GOAL <100: ICD-10-CM

## 2024-07-01 RX ORDER — ROSUVASTATIN CALCIUM 10 MG/1
10 TABLET, COATED ORAL DAILY
Qty: 90 TABLET | Refills: 0 | Status: SHIPPED | OUTPATIENT
Start: 2024-07-01 | End: 2024-09-26

## 2024-07-01 RX ORDER — METFORMIN HCL 500 MG
TABLET, EXTENDED RELEASE 24 HR ORAL
Qty: 270 TABLET | Refills: 3 | Status: CANCELLED | OUTPATIENT
Start: 2024-07-01

## 2024-07-01 NOTE — TELEPHONE ENCOUNTER
Med: Rosuvastatin     LOV (related): 3/29/24    Last Lab: 9/13/23      Next Appt: 7/15/24        Cholesterol   Date Value Ref Range Status   09/13/2023 184 100 - 199 mg/dL Final   06/14/2022 195 100 - 199 mg/dl Final   05/10/2021 236 (H) 100 - 199 mg/dL Final   08/10/2020 248 (H) 100 - 199 mg/dL Final     HDL Cholesterol   Date Value Ref Range Status   05/10/2021 84 >39 mg/dL Final   08/10/2020 76 >39 mg/dL Final     HDL   Date Value Ref Range Status   09/13/2023 68 40 mg/dL Final   06/14/2022 76 40 mg/dl Final     LDL Cholesterol Calculated   Date Value Ref Range Status   05/10/2021 142 (H) 0 - 99 mg/dL Final   08/10/2020 159 (H) 0 - 99 mg/dL Final     LDL CALCULATED ([facility])   Date Value Ref Range Status   09/13/2023 78 0 - 130 mg/dL Final   06/14/2022 109 0 - 130 mg/dl Final     Triglycerides   Date Value Ref Range Status   09/13/2023 187 (A) 0 - 149 mg/dL Final   06/14/2022 46 0 - 149 mg/dl Final   05/10/2021 58 0 - 149 mg/dL Final   08/10/2020 67 0 - 149 mg/dL Final     Cholesterol/HDL Ratio   Date Value Ref Range Status   05/05/2009 3.5 0.0 - 5.0 Final   03/12/2007 2.6 0.0 - 5.0 Final

## 2024-07-09 ENCOUNTER — TRANSFERRED RECORDS (OUTPATIENT)
Dept: FAMILY MEDICINE | Facility: CLINIC | Age: 64
End: 2024-07-09

## 2024-07-17 ENCOUNTER — ORDERS ONLY (AUTO-RELEASED) (OUTPATIENT)
Dept: FAMILY MEDICINE | Facility: CLINIC | Age: 64
End: 2024-07-17

## 2024-07-17 ENCOUNTER — OFFICE VISIT (OUTPATIENT)
Dept: FAMILY MEDICINE | Facility: CLINIC | Age: 64
End: 2024-07-17

## 2024-07-17 VITALS
DIASTOLIC BLOOD PRESSURE: 90 MMHG | BODY MASS INDEX: 26.19 KG/M2 | OXYGEN SATURATION: 96 % | WEIGHT: 155 LBS | HEART RATE: 73 BPM | SYSTOLIC BLOOD PRESSURE: 113 MMHG

## 2024-07-17 DIAGNOSIS — E11.69 TYPE 2 DIABETES MELLITUS WITH OTHER SPECIFIED COMPLICATION, WITHOUT LONG-TERM CURRENT USE OF INSULIN (H): Primary | ICD-10-CM

## 2024-07-17 DIAGNOSIS — Z12.11 SCREENING FOR COLON CANCER: ICD-10-CM

## 2024-07-17 PROBLEM — F32.5 MAJOR DEPRESSIVE DISORDER WITH SINGLE EPISODE, IN FULL REMISSION (H): Status: ACTIVE | Noted: 2024-03-14

## 2024-07-17 LAB — HBA1C MFR BLD: 6.9 % (ref 4–6)

## 2024-07-17 PROCEDURE — 83036 HEMOGLOBIN GLYCOSYLATED A1C: CPT | Mod: 90 | Performed by: FAMILY MEDICINE

## 2024-07-17 PROCEDURE — 99207 PR FOOT EXAM NO CHARGE: CPT | Performed by: FAMILY MEDICINE

## 2024-07-17 PROCEDURE — 36415 COLL VENOUS BLD VENIPUNCTURE: CPT | Performed by: FAMILY MEDICINE

## 2024-07-17 PROCEDURE — G2211 COMPLEX E/M VISIT ADD ON: HCPCS | Performed by: FAMILY MEDICINE

## 2024-07-17 PROCEDURE — 99213 OFFICE O/P EST LOW 20 MIN: CPT | Performed by: FAMILY MEDICINE

## 2024-07-17 RX ORDER — UBIDECARENONE 75 MG
100 CAPSULE ORAL DAILY
COMMUNITY

## 2024-07-17 NOTE — PROGRESS NOTES
Stopped Trulicity  Now on a vegan no fat diet    ROS:  General and Resp. completed and negative except as noted above    Histories: reviewed    OBJECTIVE:                                                    BP (!) 113/90   Pulse 73   Wt 70.3 kg (155 lb)   SpO2 96%   BMI 26.19 kg/m    Body mass index is 26.19 kg/m .   APPEARANCE: = Relaxed and in no distress  Resp effort = Calm regular breathing  Breath Sounds = Good air movement with no rales or rhonchi in any lung fields     ASSESSMENT/PLAN:                                                    Quality gaps reviewed    Leticia was seen today for recheck medication and diabetes.    Diagnoses and all orders for this visit:    Type 2 diabetes mellitus with other specified complication, without long-term current use of insulin (H)  -     Hemoglobin A1C ([facility])  -     VENOUS COLLECTION    Much improved on vegan diet!!  continue    Work on weight loss    Health maintenance items are reviewed in Epic and correct as of today:    Lc Michael MD  [facility]  Family Practice  [facility]  [phone]    For any issues my office # is [phone]

## 2024-09-03 LAB — NONINV COLON CA DNA+OCC BLD SCRN STL QL: NEGATIVE

## 2024-09-25 DIAGNOSIS — E78.5 HYPERLIPIDEMIA LDL GOAL <100: ICD-10-CM

## 2024-09-25 DIAGNOSIS — E11.69 TYPE 2 DIABETES MELLITUS WITH OTHER SPECIFIED COMPLICATION, WITHOUT LONG-TERM CURRENT USE OF INSULIN (H): ICD-10-CM

## 2024-09-25 NOTE — TELEPHONE ENCOUNTER
Med: Rosuvastatin      LOV (related): 7/17/24    Last Lab: 7/17/24      Due for F/U around: 6 months for DM check  2/17/25    Next Appt: No current future appointments scheduled at [facility]         Cholesterol   Date Value Ref Range Status   09/13/2023 184 100 - 199 mg/dL Final   06/14/2022 195 100 - 199 mg/dl Final   05/10/2021 236 (H) 100 - 199 mg/dL Final   08/10/2020 248 (H) 100 - 199 mg/dL Final     HDL Cholesterol   Date Value Ref Range Status   05/10/2021 84 >39 mg/dL Final   08/10/2020 76 >39 mg/dL Final     HDL   Date Value Ref Range Status   09/13/2023 68 40 mg/dL Final   06/14/2022 76 40 mg/dl Final     LDL Cholesterol Calculated   Date Value Ref Range Status   05/10/2021 142 (H) 0 - 99 mg/dL Final   08/10/2020 159 (H) 0 - 99 mg/dL Final     LDL CALCULATED ([facility])   Date Value Ref Range Status   09/13/2023 78 0 - 130 mg/dL Final   06/14/2022 109 0 - 130 mg/dl Final     Triglycerides   Date Value Ref Range Status   09/13/2023 187 (A) 0 - 149 mg/dL Final   06/14/2022 46 0 - 149 mg/dl Final   05/10/2021 58 0 - 149 mg/dL Final   08/10/2020 67 0 - 149 mg/dL Final     Cholesterol/HDL Ratio   Date Value Ref Range Status   05/05/2009 3.5 0.0 - 5.0 Final   03/12/2007 2.6 0.0 - 5.0 Final

## 2024-09-26 RX ORDER — ROSUVASTATIN CALCIUM 10 MG/1
10 TABLET, COATED ORAL DAILY
Qty: 90 TABLET | Refills: 0 | Status: SHIPPED | OUTPATIENT
Start: 2024-09-26

## 2024-10-10 SDOH — HEALTH STABILITY: PHYSICAL HEALTH: ON AVERAGE, HOW MANY DAYS PER WEEK DO YOU ENGAGE IN MODERATE TO STRENUOUS EXERCISE (LIKE A BRISK WALK)?: 5 DAYS

## 2024-10-10 SDOH — HEALTH STABILITY: PHYSICAL HEALTH: ON AVERAGE, HOW MANY MINUTES DO YOU ENGAGE IN EXERCISE AT THIS LEVEL?: 50 MIN

## 2024-10-10 ASSESSMENT — SOCIAL DETERMINANTS OF HEALTH (SDOH): HOW OFTEN DO YOU GET TOGETHER WITH FRIENDS OR RELATIVES?: TWICE A WEEK

## 2024-10-10 NOTE — PATIENT INSTRUCTIONS
Patient Education   Preventive Care Advice   This is general advice given by our system to help you stay healthy. However, your care team may have specific advice just for you. Please talk to your care team about your preventive care needs.  Nutrition  Eat 5 or more servings of fruits and vegetables each day.  Try wheat bread, brown rice and whole grain pasta (instead of white bread, rice, and pasta).  Get enough calcium and vitamin D. Check the label on foods and aim for 100% of the RDA (recommended daily allowance).  Lifestyle  Exercise at least 150 minutes each week  (30 minutes a day, 5 days a week).  Do muscle strengthening activities 2 days a week. These help control your weight and prevent disease.  No smoking.  Wear sunscreen to prevent skin cancer.  Have a dental exam and cleaning every 6 months.  Yearly exams  See your health care team every year to talk about:  Any changes in your health.  Any medicines your care team has prescribed.  Preventive care, family planning, and ways to prevent chronic diseases.  Shots (vaccines)   HPV shots (up to age 26), if you've never had them before.  Hepatitis B shots (up to age 59), if you've never had them before.  COVID-19 shot: Get this shot when it's due.  Flu shot: Get a flu shot every year.  Tetanus shot: Get a tetanus shot every 10 years.  Pneumococcal, hepatitis A, and RSV shots: Ask your care team if you need these based on your risk.  Shingles shot (for age 50 and up)  General health tests  Diabetes screening:  Starting at age 35, Get screened for diabetes at least every 3 years.  If you are younger than age 35, ask your care team if you should be screened for diabetes.  Cholesterol test: At age 39, start having a cholesterol test every 5 years, or more often if advised.  Bone density scan (DEXA): At age 50, ask your care team if you should have this scan for osteoporosis (brittle bones).  Hepatitis C: Get tested at least once in your life.  STIs (sexually  transmitted infections)  Before age 24: Ask your care team if you should be screened for STIs.  After age 24: Get screened for STIs if you're at risk. You are at risk for STIs (including HIV) if:  You are sexually active with more than one person.  You don't use condoms every time.  You or a partner was diagnosed with a sexually transmitted infection.  If you are at risk for HIV, ask about PrEP medicine to prevent HIV.  Get tested for HIV at least once in your life, whether you are at risk for HIV or not.  Cancer screening tests  Cervical cancer screening: If you have a cervix, begin getting regular cervical cancer screening tests starting at age 21.  Breast cancer scan (mammogram): If you've ever had breasts, begin having regular mammograms starting at age 40. This is a scan to check for breast cancer.  Colon cancer screening: It is important to start screening for colon cancer at age 45.  Have a colonoscopy test every 10 years (or more often if you're at risk) Or, ask your provider about stool tests like a FIT test every year or Cologuard test every 3 years.  To learn more about your testing options, visit:   .  For help making a decision, visit:   https://bit.ly/sd72427.  Prostate cancer screening test: If you have a prostate, ask your care team if a prostate cancer screening test (PSA) at age 55 is right for you.  Lung cancer screening: If you are a current or former smoker ages 50 to 80, ask your care team if ongoing lung cancer screenings are right for you.  For informational purposes only. Not to replace the advice of your health care provider. Copyright   2023 [employer]. All rights reserved. Clinically reviewed by the [facility]. [employer] 889373 - REV 01/24.

## 2024-10-15 ENCOUNTER — OFFICE VISIT (OUTPATIENT)
Dept: FAMILY MEDICINE | Facility: CLINIC | Age: 64
End: 2024-10-15

## 2024-10-15 VITALS
BODY MASS INDEX: 25.47 KG/M2 | DIASTOLIC BLOOD PRESSURE: 88 MMHG | WEIGHT: 149.2 LBS | HEIGHT: 64 IN | SYSTOLIC BLOOD PRESSURE: 138 MMHG | OXYGEN SATURATION: 96 % | HEART RATE: 75 BPM

## 2024-10-15 DIAGNOSIS — Z23 NEEDS FLU SHOT: ICD-10-CM

## 2024-10-15 DIAGNOSIS — F32.5 MAJOR DEPRESSIVE DISORDER WITH SINGLE EPISODE, IN FULL REMISSION (H): ICD-10-CM

## 2024-10-15 DIAGNOSIS — Z23 COVID-19 VACCINE ADMINISTERED: ICD-10-CM

## 2024-10-15 DIAGNOSIS — N64.59 INVERTED NIPPLE: ICD-10-CM

## 2024-10-15 DIAGNOSIS — E78.5 HYPERLIPIDEMIA LDL GOAL <100: ICD-10-CM

## 2024-10-15 DIAGNOSIS — Z00.00 ENCOUNTER FOR MEDICARE ANNUAL WELLNESS EXAM: Primary | ICD-10-CM

## 2024-10-15 DIAGNOSIS — E11.65 TYPE 2 DIABETES MELLITUS WITH HYPERGLYCEMIA, WITHOUT LONG-TERM CURRENT USE OF INSULIN (H): ICD-10-CM

## 2024-10-15 LAB
ALBUMIN SERPL BCG-MCNC: 4.5 G/DL (ref 3.5–5.2)
ALP SERPL-CCNC: 75 U/L (ref 40–150)
ALT SERPL W P-5'-P-CCNC: 19 U/L (ref 0–50)
ANION GAP SERPL CALCULATED.3IONS-SCNC: 8 MMOL/L (ref 7–15)
AST SERPL W P-5'-P-CCNC: 23 U/L (ref 0–45)
BILIRUB SERPL-MCNC: 0.4 MG/DL
BUN SERPL-MCNC: 7.8 MG/DL (ref 8–23)
CALCIUM SERPL-MCNC: 9.6 MG/DL (ref 8.8–10.4)
CHLORIDE SERPL-SCNC: 102 MMOL/L (ref 98–107)
CHOLESTEROL: 170 MG/DL (ref 100–199)
CREAT SERPL-MCNC: 0.65 MG/DL (ref 0.51–0.95)
EGFRCR SERPLBLD CKD-EPI 2021: >90 ML/MIN/1.73M2
FASTING STATUS PATIENT QL REPORTED: YES
FASTING?: YES
GLUCOSE SERPL-MCNC: 147 MG/DL (ref 70–99)
HBA1C MFR BLD: 7 % (ref 4–6)
HCO3 SERPL-SCNC: 29 MMOL/L (ref 22–29)
HDL (RMG): 63 MG/DL (ref 40–?)
LDL CALCULATED (RMG): 91 MG/DL (ref 0–130)
POTASSIUM SERPL-SCNC: 4.1 MMOL/L (ref 3.4–5.3)
PROT SERPL-MCNC: 7.2 G/DL (ref 6.4–8.3)
SODIUM SERPL-SCNC: 139 MMOL/L (ref 135–145)
TRIGLYCERIDES (RMG): 78 MG/DL (ref 0–149)

## 2024-10-15 PROCEDURE — 36415 COLL VENOUS BLD VENIPUNCTURE: CPT

## 2024-10-15 PROCEDURE — 90653 IIV ADJUVANT VACCINE IM: CPT

## 2024-10-15 PROCEDURE — 84155 ASSAY OF PROTEIN SERUM: CPT

## 2024-10-15 PROCEDURE — 80061 LIPID PANEL: CPT | Mod: QW

## 2024-10-15 PROCEDURE — G0439 PPPS, SUBSEQ VISIT: HCPCS

## 2024-10-15 PROCEDURE — 83036 HEMOGLOBIN GLYCOSYLATED A1C: CPT

## 2024-10-15 PROCEDURE — G0008 ADMIN INFLUENZA VIRUS VAC: HCPCS

## 2024-10-15 PROCEDURE — 99214 OFFICE O/P EST MOD 30 MIN: CPT | Mod: 25

## 2024-10-15 PROCEDURE — 82947 ASSAY GLUCOSE BLOOD QUANT: CPT

## 2024-10-15 PROCEDURE — 82044 UR ALBUMIN SEMIQUANTITATIVE: CPT

## 2024-10-15 PROCEDURE — 80053 COMPREHEN METABOLIC PANEL: CPT

## 2024-10-15 PROCEDURE — 91320 SARSCV2 VAC 30MCG TRS-SUC IM: CPT

## 2024-10-15 PROCEDURE — 90480 ADMN SARSCOV2 VAC 1/ONLY CMP: CPT

## 2024-10-15 RX ORDER — METFORMIN HYDROCHLORIDE 500 MG/1
TABLET, EXTENDED RELEASE ORAL
Qty: 270 TABLET | Refills: 3 | Status: SHIPPED | OUTPATIENT
Start: 2024-10-15

## 2024-10-15 RX ORDER — ROSUVASTATIN CALCIUM 10 MG/1
10 TABLET, COATED ORAL DAILY
Qty: 90 TABLET | Refills: 0 | Status: CANCELLED | OUTPATIENT
Start: 2024-10-15

## 2024-10-15 NOTE — PROGRESS NOTES
Preventive Care Visit  [facility]  DONALD Oneill CNP, Family Medicine  Oct 15, 2024      Assessment & Plan     Encounter for Medicare annual wellness exam  Age-appropriate preventative health maintenance along with diet, exercise and healthy weight discussed.     Type 2 diabetes mellitus with hyperglycemia, without long-term current use of insulin (H)  A1c was 7 today. Taking Metformin BID. Reviewed lifestyle modifications. Discussed importance in compliance in all areas of diabetic control including diet, absolute medication compliance, laboratory monitoring, and attending regular follow up appointments as ordered.  Failure to comply with instructions regarding diabetes will lead to a greater chance of poor diabetic control and therefore a greater chance of diabetes related complications such as CAD, CVA, PVD, and retinopathy/neuropathy/nephropathy. Return to the clinic in every 3 months.  Continue current mediation regimen. Patient agreeable to plan. All questions answered.  - Hemoglobin A1C ([facility])  - Microalbumin ([facility])  - VENOUS COLLECTION  - metFORMIN (GLUCOPHAGE XR) 500 MG 24 hr tablet  Dispense: 270 tablet; Refill: 3  - Comprehensive metabolic panel  - Comprehensive metabolic panel    Hyperlipidemia LDL goal <100  Taking Rosuvastatin 10 mg every other day. Reviewed previous lipid results. Reviewed lifestyle modifications. Will get CT coronary calcium scan as patient wishes to stop rosuvastatin due to side effects and side effects improved with every other day dosing. Last CT coronary calcium scan was in 2017 with a score of 0. Will also recheck lipid profile. Patient agreeable to plan.   - Lipid Profile ([facility])  - CT Coronary Calcium Scan    Inverted nipple  Will recheck with diagnostic mammogram and US.   - MA Diagnostic Bilateral w/ Raman  - US Breast Left Limited 1-3 Quadrants    Major depressive disorder with single episode, in full remission (H)  Not on medications. Remains in remission.  "    Needs flu shot  - INFLUENZA VACCINE, TRIVALENT 65+ (FLUAD)    COVID-19 vaccine administered  - COVID-19 12+ (PFIZER)      Patient has been advised of split billing requirements and indicates understanding: Yes        BMI  Estimated body mass index is 25.41 kg/m  as calculated from the following:    Height as of this encounter: 1.632 m (5' 4.25\").    Weight as of this encounter: 67.7 kg (149 lb 3.2 oz).   Weight management plan: Discussed healthy diet and exercise guidelines    Counseling  Appropriate preventive services were addressed with this patient via screening, questionnaire, or discussion as appropriate for fall prevention, nutrition, physical activity, Tobacco-use cessation, social engagement, weight loss and cognition.  Checklist reviewing preventive services available has been given to the patient.  Reviewed patient's diet, addressing concerns and/or questions.   She is at risk for psychosocial distress and has been provided with information to reduce risk.   The patient was provided with written information regarding signs of hearing loss.       Work on weight loss  Regular exercise  See Patient Instructions    Return in about 3 months (around 1/15/2025) for Follow up.    Henok Kelly is a 64 year old, presenting for the following:  Physical (Fasting), Hearing Screening (Missed 2000 & 4000 Hz bilat. Passed others), Imm/Inj (COVID: Yes/FLU: Yes), Health Maintenance (Mammogram: Wanting Diagnostic - has L breast issues (dimpling)/Eye Exam: Appt scheduled for 11/4), and Recheck Medication (Rosuvastatin every other day now - doing low fat vegan diet couple months now -- feeling rosuvastatin causes body aches/pain - wanting to get off)      Health Care Directive  Patient does not have a Health Care Directive or Living Will: Discussed advance care planning with patient; however, patient declined at this time.    HPI    T2DM: Metformin 500 mg every morning and 1000 mg in the evening. Last A1c was 6.9 on " 7/17/24. Last eye exam last Oct. One scheduled in Nov 5th. Foot exam: UTD     MDD: Remains in remission. Not on medications. Took herself off of previous medications.      HLD: Rosuvastatin preventative. Taking one every other day. Vegan no fat diet since May and lost 16 lbs. Walks 3 miles a day.     Wt Readings from Last 4 Encounters:   10/15/24 67.7 kg (149 lb 3.2 oz)   07/17/24 70.3 kg (155 lb)   03/29/24 76.1 kg (167 lb 12.8 oz)   03/14/24 77.6 kg (171 lb)         H/O SVT: Jan 2020. Situational. Mother passed away. No chest pain, palpitations or shortness of breath    Health maintenance:    Breast: mammogram concern. Left nipple inverted probably 4 years afo. If squeeze nipple to open up will be white discharge. Nothing dripping. Breast looks different. Last year diagnostic.     Dexa: UTD  Colon: UTD     10/10/2024   General Health   How would you rate your overall physical health? Good   Feel stress (tense, anxious, or unable to sleep) Only a little      (!) STRESS CONCERN   10/10/2024   Nutrition   Diet: Vegetarian/vegan       10/10/2024   Exercise   Days per week of moderate/strenous exercise 5 days   Average minutes spent exercising at this level 50 min            10/10/2024   Social Factors   Frequency of gathering with friends or relatives Twice a week   Worry food won't last until get money to buy more No   Food not last or not have enough money for food? No   Do you have housing? (Housing is defined as stable permanent housing and does not include staying ouside in a car, in a tent, in an abandoned building, in an overnight shelter, or couch-surfing.) Yes   Are you worried about losing your housing? No   Lack of transportation? No   Unable to get utilities (heat,electricity)? No          10/10/2024   Fall Risk   Fallen 2 or more times in the past year? No   Trouble with walking or balance? No          10/10/2024   Activities of Daily Living- Home Safety   Needs help with the following daily activites  None of the above   Safety concerns in the home None of the above       10/10/2024   Dental   Dentist two times every year? Yes       10/10/2024   Hearing Screening   Hearing concerns? (!) IT'S HARD TO FOLLOW A CONVERSATION IN A NOISY RESTAURANT OR CROWDED ROOM.    HEARING FREQUENCY:     Left Ear:   Right Ear:     500 Hz : Pass 500 Hz : Pass   1000 Hz: Pass 1000 Hz: Pass   2000 Hz: Fail 2000 Hz: Fail   4000 Hz: Fail 4000 Hz: Fail             10/10/2024   Driving Risk Screening   Patient/family members have concerns about driving No            10/10/2024   General Alertness/Fatigue Screening   Have you been more tired than usual lately? No            10/10/2024   Urinary Incontinence Screening   Bothered by leaking urine in past 6 months No            10/10/2024   TB Screening   Were you born outside of the [place]? No            Today's PHQ-9 Score:       2024     6:17 AM   PHQ-9 SCORE   PHQ-9 Total Score MyChart 1 (Minimal depression)   PHQ-9 Total Score 1           10/10/2024   Substance Use   Alcohol more than 3/day or more than 7/wk No   Do you have a current opioid prescription? No   How severe/bad is pain from 1 to 10? 0/10 (No Pain)   Do you use any other substances recreationally? No        Social History     Tobacco Use    Smoking status: Former     Current packs/day: 0.00     Types: Cigarettes     Quit date: 3/12/1983     Years since quittin.6    Smokeless tobacco: Never   Substance Use Topics    Alcohol use: Yes     Alcohol/week: 0.0 - 2.5 standard drinks of alcohol     Comment: socially    Drug use: No           10/12/2023   LAST FHS-7 RESULTS   1st degree relative breast or ovarian cancer No   Any relative bilateral breast cancer No   Any male have breast cancer No   Any ONE woman have BOTH breast AND ovarian cancer No   Any woman with breast cancer before 50yrs No   2 or more relatives with breast AND/OR ovarian cancer Yes   2 or more relatives with breast AND/OR bowel cancer No           Mammogram  Screening - Mammogram every 1-2 years updated in Health Maintenance based on mutual decision making      History of abnormal Pap smear: Status post hysterectomy.        ASCVD Risk   The 10-year ASCVD risk score (Elle FARMER, et al., 2019) is: 11.8%    Values used to calculate the score:      Age: 64 years      Sex: Female      Is Non- : No      Diabetic: Yes      Tobacco smoker: No      Systolic Blood Pressure: 138 mmHg      Is BP treated: No      HDL Cholesterol: 63 mg/dL      Total Cholesterol: 170 mg/dL          Reviewed and updated as needed this visit by Provider   Tobacco  Allergies  Meds  Problems  Med Hx  Surg Hx  Fam Hx            Past Medical History:   Diagnosis Date    Depressive disorder, not elsewhere classified 1997    treated with Zoloft for 8 months    Lumbago     L5-S1 radiculopathy    Spider veins     Tachycardia, unspecified 2004    Single episode treated with Adenosine     Past Surgical History:   Procedure Laterality Date    BLADDER SURGERY      CYSTOCELE REPAIR N/A 0/2013    HYSTERECTOMY N/A 05/23/20014    Supracervical. See Path via Care Everywhere at [facility] [place].     HYSTERECTOMY, PAP NO LONGER INDICATED Bilateral 2014    ORTHOPEDIC SURGERY Left 04/19/2016    left foot bunionectomy    ORTHOPEDIC SURGERY      Foot surgery     RECTOCELE REPAIR N/A 05/2013    [facility] NONSPECIFIC PROCEDURE  1991    Tubal ligation    [facility] NONSPECIFIC PROCEDURE      Lasik eye surgery    [facility] NONSPECIFIC PROCEDURE  03/2007     L5-S1 discectomy     Lab work is in process  Current providers sharing in care for this patient include:  Patient Care Team:  Lc Michael MD as PCP - General (Family Practice)  Ema Mcleod RPH as Assigned MTM Pharmacist  Ema Mcleod RPH as Pharmacist (Pharmacist)  Ashley Goddard MD as Assigned PCP    The following health maintenance items are reviewed in Epic and correct as of today:  Health Maintenance   Topic Date Due    ZOSTER IMMUNIZATION  "(1 of 2) Never done    RSV VACCINE (1 - Risk 60-74 years 1-dose series) Never done    MICROALBUMIN  09/13/2024    EYE EXAM  10/01/2024    MAMMO SCREENING  10/12/2024    A1C  01/15/2025    PHQ-9  01/15/2025    BMP  03/29/2025    DIABETIC FOOT EXAM  07/17/2025    MEDICARE ANNUAL WELLNESS VISIT  10/15/2025    LIPID  10/15/2025    FALL RISK ASSESSMENT  10/15/2025    DTAP/TDAP/TD IMMUNIZATION (3 - Td or Tdap) 04/05/2026    COLORECTAL CANCER SCREENING  08/28/2027    DEXA  10/12/2028    ADVANCE CARE PLANNING  10/15/2029    HEPATITIS C SCREENING  Completed    DEPRESSION ACTION PLAN  Completed    INFLUENZA VACCINE  Completed    Pneumococcal Vaccine: 65+ Years  Completed    COVID-19 Vaccine  Completed    HPV IMMUNIZATION  Aged Out    MENINGITIS IMMUNIZATION  Aged Out    RSV MONOCLONAL ANTIBODY  Aged Out    PAP  Discontinued       Review of Systems  Constitutional, HEENT, cardiovascular, pulmonary, GI, , musculoskeletal, neuro, skin, endocrine and psych systems are negative, except as otherwise noted.     Objective    Exam  /88   Pulse 75   Ht 1.632 m (5' 4.25\")   Wt 67.7 kg (149 lb 3.2 oz)   SpO2 96%   BMI 25.41 kg/m     Estimated body mass index is 25.41 kg/m  as calculated from the following:    Height as of this encounter: 1.632 m (5' 4.25\").    Weight as of this encounter: 67.7 kg (149 lb 3.2 oz).    Physical Exam  GENERAL: alert and no distress  EYES: Eyes grossly normal to inspection, PERRL and conjunctivae and sclerae normal  HENT: ear canals and TM's normal, nose and mouth without ulcers or lesions  NECK: no adenopathy, no asymmetry, masses, or scars  RESP: lungs clear to auscultation - no rales, rhonchi or wheezes  BREAST: Will get UTD mammogram  CV: regular rate and rhythm, normal S1 S2, no S3 or S4, no murmur, click or rub, no peripheral edema  ABDOMEN: soft, nontender, no hepatosplenomegaly, no masses and bowel sounds normal  MS: no gross musculoskeletal defects noted, no edema  SKIN: no suspicious " lesions or rashes  NEURO: Normal strength and tone, mentation intact and speech normal  PSYCH: mentation appears normal, affect normal/bright         10/15/2024   Mini Cog   Clock Draw Score 2 Normal   3 Item Recall 3 objects recalled   Mini Cog Total Score 5             Signed Electronically by: DONALD Oneill CNP

## 2024-10-16 LAB
A/C RATIO (RMG): ABNORMAL
ALBUMIN- RMG: 10 (ref 0–10)
INTERPRETATION: ABNORMAL
URINE CREATININE - RMG: 10 (ref 0–300)

## 2024-10-17 ENCOUNTER — HOSPITAL ENCOUNTER (OUTPATIENT)
Dept: MAMMOGRAPHY | Facility: CLINIC | Age: 64
Discharge: HOME | End: 2024-10-17
Payer: COMMERCIAL

## 2024-10-17 DIAGNOSIS — N64.59 INVERTED NIPPLE: ICD-10-CM

## 2024-10-17 PROCEDURE — 76642 ULTRASOUND BREAST LIMITED: CPT | Mod: LT

## 2024-10-17 PROCEDURE — 77066 DX MAMMO INCL CAD BI: CPT

## 2024-10-24 ENCOUNTER — OFFICE VISIT (OUTPATIENT)
Dept: FAMILY MEDICINE | Facility: CLINIC | Age: 64
End: 2024-10-24

## 2024-10-24 ENCOUNTER — MYC MEDICAL ADVICE (OUTPATIENT)
Dept: FAMILY MEDICINE | Facility: CLINIC | Age: 64
End: 2024-10-24

## 2024-10-24 VITALS
BODY MASS INDEX: 26.06 KG/M2 | SYSTOLIC BLOOD PRESSURE: 136 MMHG | TEMPERATURE: 98.2 F | OXYGEN SATURATION: 99 % | DIASTOLIC BLOOD PRESSURE: 73 MMHG | HEART RATE: 74 BPM | WEIGHT: 153 LBS

## 2024-10-24 DIAGNOSIS — R39.15 URGENCY OF URINATION: ICD-10-CM

## 2024-10-24 DIAGNOSIS — K57.32 DIVERTICULITIS OF COLON: Primary | ICD-10-CM

## 2024-10-24 DIAGNOSIS — Z87.19 HISTORY OF DIVERTICULITIS: ICD-10-CM

## 2024-10-24 DIAGNOSIS — R10.32 LLQ ABDOMINAL PAIN: ICD-10-CM

## 2024-10-24 LAB
% GRANULOCYTES: 71.4 % (ref 42.2–75.2)
BACTERIA (RMG): ABNORMAL
BILIRUBIN (RMG): NEGATIVE
BLOOD (RMG): NEGATIVE
CASTS (RMG): ABNORMAL
COLOR UR: YELLOW
CRYSTAL (RMG): ABNORMAL
EPITHELIAL (RMG): ABNORMAL
GLUCOSE (RMG): NEGATIVE
HCT VFR BLD AUTO: 38 % (ref 35–46)
HEMOGLOBIN: 12.9 G/DL (ref 11.8–15.5)
KETONE (RMG): NEGATIVE
LEUKOCYTE (RMG): NEGATIVE
LYMPHOCYTES NFR BLD AUTO: 21.4 % (ref 20.5–51.1)
MCH RBC QN AUTO: 32.1 PG (ref 27–31)
MCHC RBC AUTO-ENTMCNC: 34 G/DL (ref 33–37)
MCV RBC AUTO: 94.6 FL (ref 80–100)
MONOCYTES NFR BLD AUTO: 7.2 % (ref 1.7–9.3)
MUCOUS (RMG): ABNORMAL
NITRITE (RMG): NEGATIVE
PH UR STRIP: 6.5 PH (ref 5–7)
PLATELET # BLD AUTO: 418 K/UL (ref 140–450)
PROTEIN (RMG): NEGATIVE
RBC # BLD AUTO: 4.01 X10/CMM (ref 3.7–5.2)
RBC (RMG): ABNORMAL
SP GR UR STRIP: 1.02
UROBILINOGEN (RMG): 0.2
WBC # BLD AUTO: 10.8 X10/CMM (ref 3.8–11)
WBC (RMG): ABNORMAL

## 2024-10-24 PROCEDURE — G2211 COMPLEX E/M VISIT ADD ON: HCPCS

## 2024-10-24 PROCEDURE — 85025 COMPLETE CBC W/AUTO DIFF WBC: CPT

## 2024-10-24 PROCEDURE — 99213 OFFICE O/P EST LOW 20 MIN: CPT

## 2024-10-24 PROCEDURE — 36415 COLL VENOUS BLD VENIPUNCTURE: CPT

## 2024-10-24 PROCEDURE — 81003 URINALYSIS AUTO W/O SCOPE: CPT

## 2024-10-24 RX ORDER — METRONIDAZOLE 500 MG/1
500 TABLET ORAL 3 TIMES DAILY
Qty: 21 TABLET | Refills: 0 | Status: SHIPPED | OUTPATIENT
Start: 2024-10-24 | End: 2024-10-31

## 2024-10-24 RX ORDER — SULFAMETHOXAZOLE AND TRIMETHOPRIM 800; 160 MG/1; MG/1
1 TABLET ORAL 2 TIMES DAILY
Qty: 14 TABLET | Refills: 0 | Status: SHIPPED | OUTPATIENT
Start: 2024-10-24 | End: 2024-10-31

## 2024-10-24 NOTE — PROGRESS NOTES
Assessment & Plan     Diverticulitis of colon  - suspect infection, will treat with regimen below  - Education about adverse effects of prescription medication discussed  -Red flags that warrant emergent evaluation discussed  -Patient verbalized understanding and is agreeable to the discussed plan of care.    - metroNIDAZOLE (FLAGYL) 500 MG tablet  Dispense: 21 tablet; Refill: 0  - sulfamethoxazole-trimethoprim (BACTRIM DS) 800-160 MG tablet  Dispense: 14 tablet; Refill: 0    LLQ abdominal pain  - see plan above   - CBC with Diff/Plt ([facility]) - no abnormal levels    - VENOUS COLLECTION    History of diverticulitis  - CBC with Diff/Plt ([facility])  - VENOUS COLLECTION    Urgency of urination  - suspect r/t diverticulitis - urinalysis normal  - Urinalysis ([facility])  - VENOUS COLLECTION              See Patient Instructions    No follow-ups on file.    Subjective   Leticia is a 64 year old, presenting for the following health issues:  Gastrointestinal Problem (LLQ pain since last night. Also having chills and BMs have been irregular./Hospitalized for diverticulitis in 03/2018 (Note available in EPIC)/Denies bloody stool, fever, vomiting/)    HPI   Answers submitted by the patient for this visit:  General Questionnaire (Submitted on 10/24/2024)  Chief Complaint: Chronic problems general questions HPI Form  How many days per week do you miss taking your medication?: 0  General Concern (Submitted on 10/24/2024)  Chief Complaint: Chronic problems general questions HPI Form  What is the reason for your visit today?: Diverticulosis  When did your symptoms begin?: 1-3 days ago  What are your symptoms?: Pain/chills  How would you describe these symptoms?: Moderate  Are your symptoms:: Staying the same  Have you had these symptoms before?: Yes  Have you tried or received treatment for these symptoms before?: Yes  Did that treatment work? : Yes  Is there anything that makes you feel better?: Tylenol  Questionnaire about: Chronic problems  general questions HPI Form (Submitted on 10/24/2024)  Chief Complaint: Chronic problems general questions HPI Form    1.) diverticulitis flare: concerned about having mid/left lower abdominal pain with insidious onset last night, eating rice and pushing fluids, not as severe but remains present.  More tired and overall malaise. Does also have some urgency and frequency of urination.  No fever or body aches, did have some chills last night.          Review of Systems  Constitutional, HEENT, cardiovascular, pulmonary, gi and gu systems are negative, except as otherwise noted.      Objective    /73   Pulse 74   Temp 98.2  F (36.8  C)   Wt 69.4 kg (153 lb)   SpO2 99%   BMI 26.06 kg/m    Body mass index is 26.06 kg/m .  Physical Exam   GENERAL: alert and no distress, mildly ill appearing  NECK: no adenopathy, no asymmetry, masses, or scars  RESP: lungs clear to auscultation - no rales, rhonchi or wheezes  CV: regular rate and rhythm, normal S1 S2, no S3 or S4, no murmur, click or rub, no peripheral edema  ABDOMEN: soft, nontender, without hepatosplenomegaly or masses, tenderness LLQ, and bowel sounds normal  MS: no gross musculoskeletal defects noted, no edema  SKIN: no suspicious lesions or rashes  PSYCH: mentation appears normal, affect normal/bright    Results for orders placed or performed in visit on 10/24/24 (from the past 24 hours)   CBC with Diff/Plt ([facility])   Result Value Ref Range    WBC x10/cmm 10.8 3.8 - 11.0 x10/cmm    % Lymphocytes 21.4 20.5 - 51.1 %    % Monocytes 7.2 1.7 - 9.3 %    % Granulocytes 71.4 42.2 - 75.2 %    RBC x10/cmm 4.01 3.7 - 5.2 x10/cmm    Hemoglobin 12.9 11.8 - 15.5 g/dl    Hematocrit 38.0 35 - 46 %    MCV 94.6 80 - 100 fL    MCH 32.1 (A) 27.0 - 31.0 pg    MCHC 34.0 33.0 - 37.0 g/dL    Platelet Count 418 140 - 450 K/uL   Urinalysis ([facility])   Result Value Ref Range    Color Urine Yellow     pH Urine 6.5 pH    Specific Gravity Urine 1.025     PROTEIN ([facility]) Negative Negative     GLUCOSE ([facility]) Negative Negative    KETONE ([facility]) Negative Negative    LEUKOCYTE ([facility]) Negative Negative    BLOOD ([facility]) Negative Negative    Nitrite (RMG) Negative Negative    BILIRUBIN ([facility]) Negative Negative    UROBILINOGEN ([facility]MG) 0.2 0.2 - 1    WBC ([facility]MG) 0-3 (A) None    RBC ([facility]) Rare (A) None    CRYSTAL ([facility]) None None    BACTERIA ([facility]) None None    MUCOUS ([facility]) None None    CASTS ([facility]) None None    EPITHELIAL ([facility]) Few            Signed Electronically by: DONALD Quan CNP    The longitudinal plan of care for the diagnosis(es)/condition(s) as documented were addressed during this visit. Due to the added complexity in care, I will continue to support Leticia in the subsequent management and with ongoing continuity of care.

## 2024-10-24 NOTE — TELEPHONE ENCOUNTER
"See patient's MyChart message suspecting diverticulitis flare.   Called patient to discuss.   Describes abd pain at this time as \"mild but definitely there\". Normal temp this morning.   Plan: scheduled clinic visit for 1pm this afternoon with Ольга Martinez NP. Advised clear liquid diet and call if symptoms escalate prior to appt.   Sandra Thomas RN    "

## 2024-10-30 ENCOUNTER — HOSPITAL ENCOUNTER (OUTPATIENT)
Dept: CT IMAGING | Facility: CLINIC | Age: 64
Discharge: HOME | End: 2024-10-30
Payer: COMMERCIAL

## 2024-10-30 DIAGNOSIS — E78.5 HYPERLIPIDEMIA LDL GOAL <100: ICD-10-CM

## 2024-10-30 PROCEDURE — 75571 CT HRT W/O DYE W/CA TEST: CPT | Mod: 26 | Performed by: STUDENT IN AN ORGANIZED HEALTH CARE EDUCATION/TRAINING PROGRAM

## 2024-10-30 PROCEDURE — 75571 CT HRT W/O DYE W/CA TEST: CPT

## 2024-11-04 ENCOUNTER — TRANSFERRED RECORDS (OUTPATIENT)
Dept: FAMILY MEDICINE | Facility: CLINIC | Age: 64
End: 2024-11-04

## 2024-11-04 LAB — RETINOPATHY: NEGATIVE

## 2024-11-07 ASSESSMENT — PATIENT HEALTH QUESTIONNAIRE - PHQ9: SUM OF ALL RESPONSES TO PHQ QUESTIONS 1-9: 12

## 2024-11-26 ASSESSMENT — PATIENT HEALTH QUESTIONNAIRE - PHQ9: SUM OF ALL RESPONSES TO PHQ QUESTIONS 1-9: 12

## 2025-01-09 DIAGNOSIS — E11.69 TYPE 2 DIABETES MELLITUS WITH OTHER SPECIFIED COMPLICATION, WITHOUT LONG-TERM CURRENT USE OF INSULIN (H): ICD-10-CM

## 2025-01-09 DIAGNOSIS — E78.5 HYPERLIPIDEMIA LDL GOAL <100: ICD-10-CM

## 2025-01-09 RX ORDER — ROSUVASTATIN CALCIUM 10 MG/1
10 TABLET, COATED ORAL DAILY
Qty: 90 TABLET | Refills: 0 | Status: SHIPPED | OUTPATIENT
Start: 2025-01-09

## 2025-01-18 ENCOUNTER — HEALTH MAINTENANCE LETTER (OUTPATIENT)
Age: 65
End: 2025-01-18

## 2025-04-09 ASSESSMENT — PATIENT HEALTH QUESTIONNAIRE - PHQ9
SUM OF ALL RESPONSES TO PHQ QUESTIONS 1-9: 6
SUM OF ALL RESPONSES TO PHQ QUESTIONS 1-9: 6
10. IF YOU CHECKED OFF ANY PROBLEMS, HOW DIFFICULT HAVE THESE PROBLEMS MADE IT FOR YOU TO DO YOUR WORK, TAKE CARE OF THINGS AT HOME, OR GET ALONG WITH OTHER PEOPLE: NOT DIFFICULT AT ALL

## 2025-04-10 ENCOUNTER — OFFICE VISIT (OUTPATIENT)
Dept: FAMILY MEDICINE | Facility: CLINIC | Age: 65
End: 2025-04-10

## 2025-04-10 VITALS
WEIGHT: 156.8 LBS | HEART RATE: 80 BPM | HEIGHT: 64 IN | SYSTOLIC BLOOD PRESSURE: 132 MMHG | OXYGEN SATURATION: 98 % | DIASTOLIC BLOOD PRESSURE: 84 MMHG | BODY MASS INDEX: 26.77 KG/M2

## 2025-04-10 DIAGNOSIS — Z00.00 ENCOUNTER FOR MEDICARE ANNUAL WELLNESS EXAM: Primary | ICD-10-CM

## 2025-04-10 DIAGNOSIS — T46.6X5A STATIN MYOPATHY: ICD-10-CM

## 2025-04-10 DIAGNOSIS — E11.69 TYPE 2 DIABETES MELLITUS WITH OTHER SPECIFIED COMPLICATION, WITHOUT LONG-TERM CURRENT USE OF INSULIN (H): ICD-10-CM

## 2025-04-10 DIAGNOSIS — F33.1 MODERATE EPISODE OF RECURRENT MAJOR DEPRESSIVE DISORDER (H): ICD-10-CM

## 2025-04-10 DIAGNOSIS — G72.0 STATIN MYOPATHY: ICD-10-CM

## 2025-04-10 LAB — HBA1C MFR BLD: 7.2 % (ref 4–6)

## 2025-04-10 PROCEDURE — G2211 COMPLEX E/M VISIT ADD ON: HCPCS | Performed by: FAMILY MEDICINE

## 2025-04-10 PROCEDURE — 36415 COLL VENOUS BLD VENIPUNCTURE: CPT | Performed by: FAMILY MEDICINE

## 2025-04-10 PROCEDURE — 99214 OFFICE O/P EST MOD 30 MIN: CPT | Performed by: FAMILY MEDICINE

## 2025-04-10 PROCEDURE — 3079F DIAST BP 80-89 MM HG: CPT | Performed by: FAMILY MEDICINE

## 2025-04-10 PROCEDURE — 3075F SYST BP GE 130 - 139MM HG: CPT | Performed by: FAMILY MEDICINE

## 2025-04-10 PROCEDURE — 83036 HEMOGLOBIN GLYCOSYLATED A1C: CPT | Performed by: FAMILY MEDICINE

## 2025-04-10 RX ORDER — METFORMIN HYDROCHLORIDE 500 MG/1
1000 TABLET, EXTENDED RELEASE ORAL 2 TIMES DAILY WITH MEALS
Qty: 360 TABLET | Refills: 3 | Status: SHIPPED | OUTPATIENT
Start: 2025-04-10

## 2025-04-10 NOTE — PROGRESS NOTES
"Diabetes  she  reports no new symptoms.  No significnat or regular episodes of hypo or hyperglycemia  Medication compliance: compliant most of the time  Diabetic diet compliance: compliant most of the time  Diabetic ROS: no polyuria or polydipsia, no chest pain, dyspnea or TIA's, no numbness, tingling or pain in extremities    Home blood sugar monitoring: are performed regularly, Review of patients self blood glucose monitoring shows fasting most always < 130 and post prandial average under 170.  As a direct cause of their history of diabetes they have the following Diabetic complications: none    Most  recent A1C: Smoking: BP:   Lab Results   Component Value Date    A1C 7.2 04/10/2025    A1C 7.0 10/15/2024    A1C 6.9 07/17/2024    History   Smoking Status    Former    Types: Cigarettes   Smokeless Tobacco    Never    BP Readings from Last 1 Encounters:   04/10/25 132/84        Kidney studies:  Creatinine   Date Value Ref Range Status   10/15/2024 0.65 0.51 - 0.95 mg/dL Final   03/29/2024 0.65 0.51 - 0.95 mg/dL Final   09/13/2023 0.68 0.51 - 0.95 mg/dL Final   06/14/2022 0.75 0.57 - 1.00 mg/dL Final   05/10/2021 0.78 0.57 - 1.00 mg/dL Final   01/10/2020 0.75 0.57 - 1.00 mg/dL Final   ]    GFR Estimate   Date Value Ref Range Status   10/15/2024 >90 >60 mL/min/1.73m2 Final     Comment:     eGFR calculated using 2021 CKD-EPI equation.   03/08/2018 >90 >60 mL/min/1.7m2 Final     Comment:     Non  GFR Calc                No components found for: \"MICORALBUMINLC\"      GFR Estimate If Black   Date Value Ref Range Status   03/08/2018 >90 >60 mL/min/1.7m2 Final     Comment:      GFR Calc              Statin and ASA:  Current Outpatient Medications   Medication Sig Dispense Refill    Coenzyme Q10 (CO Q 10) 100 MG CAPS Take 2 tablets by mouth daily.      cyanocobalamin (VITAMIN B-12) 100 MCG tablet Take 100 mcg by mouth daily      metFORMIN (GLUCOPHAGE XR) 500 MG 24 hr tablet Take 2 tablets " "(1,000 mg) by mouth 2 times daily (with meals). TAKE 1 TABLET BY MOUTH EVERY MORNING AND 2 TABLETS BY MOUTH EVERY EVENING AT BEDTIME WITH MEALS 360 tablet 3    multivitamin w/minerals (THERA-VIT-M) tablet       rosuvastatin (CRESTOR) 10 MG tablet TAKE 1 TABLET(10 MG) BY MOUTH DAILY 90 tablet 0     No current facility-administered medications for this visit.     ROS:  General and Resp. completed and negative except as noted above    Histories: reviewed    OBJECTIVE:                                                    /84   Pulse 80   Ht 1.621 m (5' 3.8\")   Wt 71.1 kg (156 lb 12.8 oz)   SpO2 98%   BMI 27.08 kg/m    Body mass index is 27.08 kg/m .   APPEARANCE: = Relaxed and in no distress  Resp effort = Calm regular breathing  Breath Sounds = Good air movement with no rales or rhonchi in any lung fields  Abdomen = Soft, nontender, no masses, & bowel sounds in all quadrants     ASSESSMENT/PLAN:                                                    Quality gaps reviewed    Leticia was seen today for diabetes and abdominal lump.    Diagnoses and all orders for this visit:    Encounter for Medicare annual wellness exam    Moderate episode of recurrent major depressive disorder (H)    Type 2 diabetes mellitus with other specified complication, without long-term current use of insulin (H)  -     Hemoglobin A1C ([facility])  -     VENOUS COLLECTION  -     metFORMIN (GLUCOPHAGE XR) 500 MG 24 hr tablet; Take 2 tablets (1,000 mg) by mouth 2 times daily (with meals). TAKE 1 TABLET BY MOUTH EVERY MORNING AND 2 TABLETS BY MOUTH EVERY EVENING AT BEDTIME WITH MEALS    Statin myopathy  -     Coenzyme Q10 (CO Q 10) 100 MG CAPS; Take 2 tablets by mouth daily.    Tolerating current meds as listed on med list  We managed prescriptions with refills to ensure appropriate availability of current medications.  Next follow up will be in 4 months.  Eye exam status confirmed and ordered if needed.  Reviewed importance in compliance in all areas of " diabetic control including diet, routine BS checks,  medication compliance, laboratory monitoring, and attending regular follow up appointments as ordered.  Reviewed goal of preventing complications from such as CAD, CVA, PVD, and retinopathy/neuropathy/nephropathy.      Work on weight loss    Health maintenance items are reviewed in Epic and correct as of today:    Lc Michael MD  [facility]  Family Practice  [facility]  [phone]    For any issues my office # is [phone]        Answers submitted by the patient for this visit:  Patient Health Questionnaire (Submitted on 4/9/2025)  If you checked off any problems, how difficult have these problems made it for you to do your work, take care of things at home, or get along with other people?: Not difficult at all  PHQ9 TOTAL SCORE: 6  General Questionnaire (Submitted on 4/9/2025)  Chief Complaint: Chronic problems general questions HPI Form  How many days per week do you miss taking your medication?: 0  General Concern (Submitted on 4/9/2025)  Chief Complaint: Chronic problems general questions HPI Form  What is the reason for your visit today?: small lump left abdomen  When did your symptoms begin?: More than a month  What are your symptoms?: pain in left hip - not sure if its related  How would you describe these symptoms?: Mild  Are your symptoms:: Improving  Have you had these symptoms before?: Yes  Have you tried or received treatment for these symptoms before?: Yes  Did that treatment work? : Yes  Please describe the treatment you had:: had a lump when hospitalized with diverticulitus - abcess removed  Questionnaire about: Chronic problems general questions HPI Form (Submitted on 4/9/2025)  Chief Complaint: Chronic problems general questions HPI Form  The longitudinal plan of care for the diagnosis(es)/condition(s) as documented were addressed during this visit. Due to the added complexity in care, I will continue to  support Leticia in the subsequent management and with ongoing continuity of care.

## 2025-04-15 DIAGNOSIS — E11.69 TYPE 2 DIABETES MELLITUS WITH OTHER SPECIFIED COMPLICATION, WITHOUT LONG-TERM CURRENT USE OF INSULIN (H): ICD-10-CM

## 2025-04-15 DIAGNOSIS — E78.5 HYPERLIPIDEMIA LDL GOAL <100: ICD-10-CM

## 2025-04-15 NOTE — TELEPHONE ENCOUNTER
Med: Rosuvastatin    LOV (related): 4/10/2025    Last Lab: A1C on 4/10/2025 and lipid panel on 10/15/2024     Due for F/U around: 8/10/2025    Next Appt: none scheduled      Lab Results   Component Value Date    A1C 7.2 04/10/2025    A1C 7.0 10/15/2024    A1C 6.9 07/17/2024    A1C 8.0 03/14/2024    A1C 6.2 09/13/2023       Cholesterol   Date Value Ref Range Status   10/15/2024 170 100 - 199 mg/dL Final   09/13/2023 184 100 - 199 mg/dL Final   05/10/2021 236 (H) 100 - 199 mg/dL Final   08/10/2020 248 (H) 100 - 199 mg/dL Final     HDL Cholesterol   Date Value Ref Range Status   05/10/2021 84 >39 mg/dL Final   08/10/2020 76 >39 mg/dL Final     HDL   Date Value Ref Range Status   10/15/2024 63 >=40 mg/dL Final   09/13/2023 68 >=40 mg/dL Final     LDL Cholesterol Calculated   Date Value Ref Range Status   05/10/2021 142 (H) 0 - 99 mg/dL Final   08/10/2020 159 (H) 0 - 99 mg/dL Final     LDL CALCULATED ([facility])   Date Value Ref Range Status   10/15/2024 91 0 - 130 mg/dL Final   09/13/2023 78 0 - 130 mg/dL Final     Triglycerides   Date Value Ref Range Status   10/15/2024 78 0 - 149 mg/dL Final   09/13/2023 187 (A) 0 - 149 mg/dL Final   05/10/2021 58 0 - 149 mg/dL Final   08/10/2020 67 0 - 149 mg/dL Final     Cholesterol/HDL Ratio   Date Value Ref Range Status   05/05/2009 3.5 0.0 - 5.0 Final   03/12/2007 2.6 0.0 - 5.0 Final

## 2025-04-16 RX ORDER — ROSUVASTATIN CALCIUM 10 MG/1
10 TABLET, COATED ORAL DAILY
Qty: 90 TABLET | Refills: 0 | Status: SHIPPED | OUTPATIENT
Start: 2025-04-16

## 2025-04-17 ENCOUNTER — MYC MEDICAL ADVICE (OUTPATIENT)
Dept: FAMILY MEDICINE | Facility: CLINIC | Age: 65
End: 2025-04-17

## 2025-04-17 DIAGNOSIS — E11.69 TYPE 2 DIABETES MELLITUS WITH OTHER SPECIFIED COMPLICATION, WITHOUT LONG-TERM CURRENT USE OF INSULIN (H): ICD-10-CM

## 2025-04-17 DIAGNOSIS — E78.5 HYPERLIPIDEMIA LDL GOAL <100: ICD-10-CM

## 2025-04-23 DIAGNOSIS — E78.5 HYPERLIPIDEMIA LDL GOAL <100: ICD-10-CM

## 2025-04-23 DIAGNOSIS — E11.69 TYPE 2 DIABETES MELLITUS WITH OTHER SPECIFIED COMPLICATION, WITHOUT LONG-TERM CURRENT USE OF INSULIN (H): ICD-10-CM

## 2025-04-23 RX ORDER — ROSUVASTATIN CALCIUM 5 MG/1
10 TABLET, COATED ORAL DAILY
Qty: 90 TABLET | Refills: 1 | Status: SHIPPED | OUTPATIENT
Start: 2025-04-23

## 2025-04-23 RX ORDER — ROSUVASTATIN CALCIUM 5 MG/1
5 TABLET, COATED ORAL DAILY
OUTPATIENT
Start: 2025-04-23

## 2025-05-07 ENCOUNTER — MYC MEDICAL ADVICE (OUTPATIENT)
Dept: FAMILY MEDICINE | Facility: CLINIC | Age: 65
End: 2025-05-07

## 2025-06-10 ENCOUNTER — MYC MEDICAL ADVICE (OUTPATIENT)
Dept: FAMILY MEDICINE | Facility: CLINIC | Age: 65
End: 2025-06-10

## 2025-07-01 ENCOUNTER — VIRTUAL VISIT (OUTPATIENT)
Dept: FAMILY MEDICINE | Facility: CLINIC | Age: 65
End: 2025-07-01

## 2025-07-01 DIAGNOSIS — F33.0 MILD EPISODE OF RECURRENT MAJOR DEPRESSIVE DISORDER: Primary | ICD-10-CM

## 2025-07-01 PROCEDURE — 98005 SYNCH AUDIO-VIDEO EST LOW 20: CPT

## 2025-07-01 RX ORDER — BUPROPION HYDROCHLORIDE 300 MG/1
300 TABLET ORAL EVERY MORNING
COMMUNITY
End: 2025-07-01

## 2025-07-01 RX ORDER — BUPROPION HYDROCHLORIDE 300 MG/1
300 TABLET ORAL EVERY MORNING
Qty: 90 TABLET | Refills: 3 | Status: SHIPPED | OUTPATIENT
Start: 2025-07-01

## 2025-07-01 ASSESSMENT — PATIENT HEALTH QUESTIONNAIRE - PHQ9
SUM OF ALL RESPONSES TO PHQ QUESTIONS 1-9: 6
SUM OF ALL RESPONSES TO PHQ QUESTIONS 1-9: 6
10. IF YOU CHECKED OFF ANY PROBLEMS, HOW DIFFICULT HAVE THESE PROBLEMS MADE IT FOR YOU TO DO YOUR WORK, TAKE CARE OF THINGS AT HOME, OR GET ALONG WITH OTHER PEOPLE: SOMEWHAT DIFFICULT

## 2025-07-01 NOTE — PROGRESS NOTES
"Leticia is a 64 year old who is being evaluated via a billable video visit.    How would you like to obtain your AVS? MyChart  If the video visit is dropped, the invitation should be resent by: Text to cell phone: [phone]  Will anyone else be joining your video visit? No      Assessment & Plan     Mild episode of recurrent major depressive disorder  Patient presents for depression follow up. Taking Wellbutrin 300 mg daily. Tolerating well. Symptoms well controlled. Would like to continue her current dose.  Education about adverse effects of prescription medication discussed. Reviewed mood disorders including suspected pathophysiology, role of neurotransmitters, and treatment options including medication options and counseling. Red flags that warrant emergent evaluation discussed. Patient verbalized understanding and is agreeable to the discussed plan of care. Follow up reviewed for routine preventative physical or as needed. All questions answered.     - buPROPion (WELLBUTRIN XL) 300 MG 24 hr tablet  Dispense: 90 tablet; Refill: 3            BMI  Estimated body mass index is 27.08 kg/m  as calculated from the following:    Height as of 4/10/25: 1.621 m (5' 3.8\").    Weight as of 4/10/25: 71.1 kg (156 lb 12.8 oz).   Weight management plan: Discussed healthy diet and exercise guidelines      Follow-up  Return if symptoms worsen or fail to improve, for Follow up, Routine preventive.    Subjective   Leticia is a 64 year old, presenting for the following health issues:  Medication Request (Wanting to discuss medication change for Wellbutrin, has been taking since April again)    Video Start Time: 8:05 AM    HPI      Wellbutrin: history of on and off depression. Last rx ran out in March this year. Stopped taking last July. Knew that she needed again so started up in April. Pills are going to run out and needs to refill prescription. Over the last 4-5 year but not continuously. Tried Zolof with wellbutrin in the past. Can " tell when things going downhill.         7/9/2024     6:17 AM 4/9/2025     7:50 AM 7/1/2025     7:48 AM   PHQ   PHQ-9 Total Score 1 6  6    Q9: Thoughts of better off dead/self-harm past 2 weeks Not at all Not at all Not at all       Patient-reported         Review of Systems  Constitutional, HEENT, cardiovascular, pulmonary, gi and gu systems are negative, except as otherwise noted.      Objective           Vitals:  No vitals were obtained today due to virtual visit.    Physical Exam   GENERAL: alert and no distress  EYES: Eyes grossly normal to inspection.  No discharge or erythema, or obvious scleral/conjunctival abnormalities.  RESP: No audible wheeze, cough, or visible cyanosis.    SKIN: Visible skin clear. No significant rash, abnormal pigmentation or lesions.  NEURO: Cranial nerves grossly intact.  Mentation and speech appropriate for age.  PSYCH: Appropriate affect, tone, and pace of words        Video-Visit Details    Type of service:  Video Visit   Video End Time:8:11 AM  Originating Location (pt. Location): Home    Distant Location (provider location):  On-site  Platform used for Video Visit: [employer]  Signed Electronically by: DONALD Oneill CNP      Answers submitted by the patient for this visit:  Patient Health Questionnaire (Submitted on 7/1/2025)  If you checked off any problems, how difficult have these problems made it for you to do your work, take care of things at home, or get along with other people?: Somewhat difficult  PHQ9 TOTAL SCORE: 6

## 2025-07-21 DIAGNOSIS — E78.5 HYPERLIPIDEMIA LDL GOAL <100: ICD-10-CM

## 2025-07-21 DIAGNOSIS — E11.69 TYPE 2 DIABETES MELLITUS WITH OTHER SPECIFIED COMPLICATION, WITHOUT LONG-TERM CURRENT USE OF INSULIN (H): ICD-10-CM

## 2025-07-21 RX ORDER — ROSUVASTATIN CALCIUM 5 MG/1
10 TABLET, COATED ORAL DAILY
Qty: 180 TABLET | Refills: 3 | Status: SHIPPED | OUTPATIENT
Start: 2025-07-21

## 2025-08-05 ENCOUNTER — MYC REFILL (OUTPATIENT)
Dept: FAMILY MEDICINE | Facility: CLINIC | Age: 65
End: 2025-08-05

## 2025-08-05 DIAGNOSIS — E11.69 TYPE 2 DIABETES MELLITUS WITH OTHER SPECIFIED COMPLICATION, WITHOUT LONG-TERM CURRENT USE OF INSULIN (H): ICD-10-CM

## 2025-08-31 ENCOUNTER — MYC REFILL (OUTPATIENT)
Dept: FAMILY MEDICINE | Facility: CLINIC | Age: 65
End: 2025-08-31

## 2025-08-31 DIAGNOSIS — E11.69 TYPE 2 DIABETES MELLITUS WITH OTHER SPECIFIED COMPLICATION, WITHOUT LONG-TERM CURRENT USE OF INSULIN (H): ICD-10-CM
